# Patient Record
Sex: FEMALE | Race: WHITE | NOT HISPANIC OR LATINO | Employment: OTHER | URBAN - NONMETROPOLITAN AREA
[De-identification: names, ages, dates, MRNs, and addresses within clinical notes are randomized per-mention and may not be internally consistent; named-entity substitution may affect disease eponyms.]

---

## 2020-07-13 ENCOUNTER — HOSPITAL ENCOUNTER (OUTPATIENT)
Facility: MEDICAL CENTER | Age: 60
End: 2020-07-13
Attending: PHYSICIAN ASSISTANT
Payer: COMMERCIAL

## 2020-07-13 ENCOUNTER — APPOINTMENT (OUTPATIENT)
Dept: RADIOLOGY | Facility: IMAGING CENTER | Age: 60
End: 2020-07-13
Attending: PHYSICIAN ASSISTANT
Payer: COMMERCIAL

## 2020-07-13 ENCOUNTER — OFFICE VISIT (OUTPATIENT)
Dept: URGENT CARE | Facility: PHYSICIAN GROUP | Age: 60
End: 2020-07-13
Payer: COMMERCIAL

## 2020-07-13 VITALS
SYSTOLIC BLOOD PRESSURE: 118 MMHG | HEIGHT: 62 IN | OXYGEN SATURATION: 92 % | WEIGHT: 150 LBS | DIASTOLIC BLOOD PRESSURE: 80 MMHG | HEART RATE: 108 BPM | RESPIRATION RATE: 22 BRPM | TEMPERATURE: 98.8 F | BODY MASS INDEX: 27.6 KG/M2

## 2020-07-13 DIAGNOSIS — J18.9 PNEUMONIA OF LEFT LOWER LOBE DUE TO INFECTIOUS ORGANISM: ICD-10-CM

## 2020-07-13 DIAGNOSIS — R06.02 SOB (SHORTNESS OF BREATH): ICD-10-CM

## 2020-07-13 LAB — COVID ORDER STATUS COVID19: NORMAL

## 2020-07-13 PROCEDURE — 71046 X-RAY EXAM CHEST 2 VIEWS: CPT | Mod: TC,FY | Performed by: PHYSICIAN ASSISTANT

## 2020-07-13 PROCEDURE — 99204 OFFICE O/P NEW MOD 45 MIN: CPT | Performed by: PHYSICIAN ASSISTANT

## 2020-07-13 PROCEDURE — U0003 INFECTIOUS AGENT DETECTION BY NUCLEIC ACID (DNA OR RNA); SEVERE ACUTE RESPIRATORY SYNDROME CORONAVIRUS 2 (SARS-COV-2) (CORONAVIRUS DISEASE [COVID-19]), AMPLIFIED PROBE TECHNIQUE, MAKING USE OF HIGH THROUGHPUT TECHNOLOGIES AS DESCRIBED BY CMS-2020-01-R: HCPCS

## 2020-07-13 RX ORDER — ALBUTEROL SULFATE 90 UG/1
2 AEROSOL, METERED RESPIRATORY (INHALATION) EVERY 4 HOURS PRN
Qty: 1 INHALER | Refills: 0 | Status: SHIPPED | OUTPATIENT
Start: 2020-07-13 | End: 2020-10-26

## 2020-07-13 RX ORDER — CEFUROXIME AXETIL 500 MG/1
500 TABLET ORAL 2 TIMES DAILY
Qty: 10 TAB | Refills: 0 | Status: SHIPPED | OUTPATIENT
Start: 2020-07-13 | End: 2020-07-18

## 2020-07-13 RX ORDER — DOXYCYCLINE 100 MG/1
100 CAPSULE ORAL 2 TIMES DAILY
Qty: 10 CAP | Refills: 0 | Status: SHIPPED | OUTPATIENT
Start: 2020-07-13 | End: 2020-07-18

## 2020-07-13 RX ORDER — PREDNISONE 20 MG/1
40 TABLET ORAL DAILY
Qty: 10 TAB | Refills: 0 | Status: SHIPPED | OUTPATIENT
Start: 2020-07-13 | End: 2020-07-18

## 2020-07-13 ASSESSMENT — ENCOUNTER SYMPTOMS
CHILLS: 0
SHORTNESS OF BREATH: 1
HEMOPTYSIS: 0
SPUTUM PRODUCTION: 0
SORE THROAT: 0
FEVER: 0
PALPITATIONS: 0
WHEEZING: 0
COUGH: 1

## 2020-07-13 ASSESSMENT — COPD QUESTIONNAIRES: COPD: 1

## 2020-07-13 NOTE — PROGRESS NOTES
"Subjective:      Kristel Sewell is a 59 y.o. female who presents with Cough (Cough/SOB, Pt states Sx are worse at night. )            Cough   This is a new problem. The current episode started in the past 7 days. The problem has been unchanged. The cough is productive of sputum. Associated symptoms include shortness of breath. Pertinent negatives include no chest pain, chills, ear pain, fever, hemoptysis, sore throat or wheezing. Nothing aggravates the symptoms. She has tried nothing for the symptoms. Her past medical history is significant for COPD.       Review of Systems   Constitutional: Positive for malaise/fatigue. Negative for chills and fever.   HENT: Negative for congestion, ear pain and sore throat.    Respiratory: Positive for cough and shortness of breath. Negative for hemoptysis, sputum production and wheezing.    Cardiovascular: Negative for chest pain and palpitations.   All other systems reviewed and are negative.    PMH:  has no past medical history on file.  MEDS: No current outpatient medications on file.  ALLERGIES:   Allergies   Allergen Reactions   • Penicillins      SURGHX: History reviewed. No pertinent surgical history.  SOCHX:    FH: Family history was reviewed, no pertinent findings to report  Medications, Allergies, and current problem list reviewed today in Epic'   Objective:     Blood Pressure 118/80   Pulse (Abnormal) 108   Temperature 37.1 °C (98.8 °F) (Temporal)   Respiration (Abnormal) 22   Height 1.575 m (5' 2\")   Weight 68 kg (150 lb)   Oxygen Saturation 92%   Body Mass Index 27.44 kg/m²      Physical Exam  Vitals signs reviewed.   Constitutional:       General: She is not in acute distress.     Appearance: She is well-developed. She is not ill-appearing or toxic-appearing.      Interventions: She is not intubated.  HENT:      Head: Normocephalic and atraumatic.      Right Ear: Hearing, tympanic membrane, ear canal and external ear normal.      Left Ear: Hearing, " tympanic membrane, ear canal and external ear normal.      Nose: Nose normal.      Mouth/Throat:      Pharynx: Uvula midline.   Eyes:      General: Lids are normal.      Conjunctiva/sclera: Conjunctivae normal.   Neck:      Musculoskeletal: Full passive range of motion without pain, normal range of motion and neck supple.   Cardiovascular:      Rate and Rhythm: Regular rhythm.      Heart sounds: Normal heart sounds, S1 normal and S2 normal. No murmur. No friction rub. No gallop.    Pulmonary:      Effort: Pulmonary effort is normal. Tachypnea present. No bradypnea, accessory muscle usage or respiratory distress. She is not intubated.      Breath sounds: Normal breath sounds. Decreased air movement present. No decreased breath sounds, wheezing, rhonchi or rales.   Chest:      Chest wall: No tenderness.   Musculoskeletal: Normal range of motion.   Skin:     General: Skin is warm and dry.   Neurological:      Mental Status: She is alert and oriented to person, place, and time.   Psychiatric:         Speech: Speech normal.         Behavior: Behavior normal.         Thought Content: Thought content normal.         Judgment: Judgment normal.            7/13/2020 11:40 AM     HISTORY/REASON FOR EXAM:  Shortness of breath     TECHNIQUE/EXAM DESCRIPTION:  PA and lateral views of the chest.     COMPARISON:  None     FINDINGS:     There is blunting of the right costophrenic angle. The heart is not enlarged. Atherosclerotic calcification is seen.  There is a calcified granuloma in the left midlung. There is a left perihilar focal opacity. No pneumothorax is identified. Degenerative   changes are seen in the spine.     IMPRESSION:     Left perihilar focal opacity may represent pneumonia or a mass. Further evaluation with CT chest is recommended.     Blunting of the right costophrenic angle may be related to pleural thickening or trace pleural fluid.     Sequelae of prior granulomatous exposure.     Atherosclerotic plaque.      Assessment/Plan:     Patient is a 59 yr old female who presents for evaluation of shortness of breath.  Pt states she has had shortness of breath and cough for 3 days.  Pt denies red flag symptoms fever, chest pain, orthopnea, pleuritic pain, edema, SOB w/ exertion.  PMH everyday smoker with history of COPD.  Vital signs are within normal limits.  Pt does not appear in respiratory distress and is speaking in full sentences. There is no tripoding, stridor, work of breathing.  Upper airway is open and clear with no JVD.  Oxygen is 92% on room air.  Tachycardic without murmurs, rubs, or gallops with auscultation of heart.  Diminished lung sounds.  No signs of cyanosis, strong capillary refill and extremity pulses.  No pitting edema.  Chest x-ray shows likely pneumonia.  At this time it appears she is in stable condition to be treated outpatient.  She is to follow-up in 3 days for reevaluation.  If symptoms worsen she is to present to the emergency department for further evaluation and treatment.    Diagnosis differential includes but not limited to: Bronchospasm, Asthma/COPD/CHF exacerbation, Pneumonia, PE, Angioedema, Epiglottitis, Anaphylaxis, Pneumothorax, Interstitial lung disease, Cardiac arrhythmia, ACS, Pericardial effusion, Anemia, Sepsis, DKA, Neuromuscular weakness, Toxicology etiology.      1. Pneumonia of left lower lobe due to infectious organism  DX-CHEST-2 VIEWS    cefUROXime (CEFTIN) 500 MG Tab    doxycycline (MONODOX) 100 MG capsule    albuterol (PROAIR HFA) 108 (90 Base) MCG/ACT Aero Soln inhalation aerosol    predniSONE (DELTASONE) 20 MG Tab    REFERRAL TO FAMILY PRACTICE    COVID/SARS COV-2 PCR       Differential diagnosis, natural history, supportive care discussed. Follow-up with primary care provider within 7-10 days, emergency room precautions discussed.  Patient and/or family appears understanding of information.  Handout and review of patients diagnosis and treatment was discussed  extensively.

## 2020-07-15 ENCOUNTER — TELEPHONE (OUTPATIENT)
Dept: URGENT CARE | Facility: CLINIC | Age: 60
End: 2020-07-15

## 2020-07-15 LAB
SARS-COV-2 RNA RESP QL NAA+PROBE: NOTDETECTED
SPECIMEN SOURCE: NORMAL

## 2020-10-26 ENCOUNTER — OFFICE VISIT (OUTPATIENT)
Dept: URGENT CARE | Facility: PHYSICIAN GROUP | Age: 60
End: 2020-10-26
Payer: COMMERCIAL

## 2020-10-26 ENCOUNTER — APPOINTMENT (OUTPATIENT)
Dept: RADIOLOGY | Facility: IMAGING CENTER | Age: 60
End: 2020-10-26
Attending: FAMILY MEDICINE
Payer: COMMERCIAL

## 2020-10-26 VITALS
OXYGEN SATURATION: 97 % | HEART RATE: 82 BPM | HEIGHT: 62 IN | TEMPERATURE: 97.5 F | RESPIRATION RATE: 16 BRPM | SYSTOLIC BLOOD PRESSURE: 140 MMHG | BODY MASS INDEX: 27.64 KG/M2 | WEIGHT: 150.2 LBS | DIASTOLIC BLOOD PRESSURE: 92 MMHG

## 2020-10-26 DIAGNOSIS — R93.89 ABNORMAL FINDING ON CHEST XRAY: ICD-10-CM

## 2020-10-26 DIAGNOSIS — R06.02 SHORTNESS OF BREATH: ICD-10-CM

## 2020-10-26 DIAGNOSIS — J44.1 COPD EXACERBATION (HCC): ICD-10-CM

## 2020-10-26 PROCEDURE — 99214 OFFICE O/P EST MOD 30 MIN: CPT | Performed by: FAMILY MEDICINE

## 2020-10-26 PROCEDURE — 71046 X-RAY EXAM CHEST 2 VIEWS: CPT | Mod: TC,FY | Performed by: FAMILY MEDICINE

## 2020-10-26 RX ORDER — ALBUTEROL SULFATE 90 UG/1
AEROSOL, METERED RESPIRATORY (INHALATION)
Qty: 8.5 G | Refills: 5 | Status: SHIPPED | OUTPATIENT
Start: 2020-10-26 | End: 2021-10-23

## 2020-10-26 RX ORDER — PREDNISONE 20 MG/1
40 TABLET ORAL DAILY
Qty: 10 TAB | Refills: 0 | Status: SHIPPED | OUTPATIENT
Start: 2020-10-26 | End: 2020-10-31

## 2020-10-26 NOTE — PROGRESS NOTES
Chief Complaint:    Chief Complaint   Patient presents with   • Shortness of Breath     due to COPD, on O2 during the past week.    • Asthma     refill on RX albuterol.        History of Present Illness:     present. This is a new problem. Patient reports having shortness of breath for 6 months or longer, but has run out of MDI. She reports history of COPD and has oxygen to use, but no other lung meds. She was treated with Ceftin, Doxycycline, Prednisone, and Albuterol MDI on 7/13/2020 which helped. CXR (7/13/2020) showed: Left perihilar focal opacity may represent pneumonia or a mass. Further evaluation with CT chest is recommended.      Review of Systems:    Constitutional: Negative for fever, chills, and diaphoresis.   Eyes: Negative for change in vision, photophobia, pain, redness, and discharge.  ENT: Negative for ear pain, ear discharge, hearing loss, tinnitus, nasal congestion, nosebleeds, and sore throat.    Respiratory: See HPI.   Cardiovascular: Negative for chest pain, palpitations, orthopnea, claudication, leg swelling, and PND.   Gastrointestinal: Negative for abdominal pain, nausea, vomiting, diarrhea, constipation, blood in stool, and melena.   Genitourinary: Negative for dysuria, urinary urgency, urinary frequency, hematuria, and flank pain.   Musculoskeletal: Negative for myalgias, joint pain, neck pain, and back pain.   Skin: Negative for rash and itching.   Neurological: Negative for dizziness, tingling, tremors, sensory change, speech change, focal weakness, seizures, loss of consciousness, and headaches.   Endo: Negative for polydipsia.   Heme: Does not bruise/bleed easily.   Psychiatric/Behavioral: Negative for depression, suicidal ideas, hallucinations, memory loss and substance abuse. The patient is not nervous/anxious and does not have insomnia.        Past Medical History:    No past medical history on file.    Past Surgical History:    No past surgical history on file.    Social  "History:    Social History     Socioeconomic History   • Marital status:      Spouse name: Not on file   • Number of children: Not on file   • Years of education: Not on file   • Highest education level: Not on file   Occupational History   • Not on file   Social Needs   • Financial resource strain: Not on file   • Food insecurity     Worry: Not on file     Inability: Not on file   • Transportation needs     Medical: Not on file     Non-medical: Not on file   Tobacco Use   • Smoking status: Not on file   Substance and Sexual Activity   • Alcohol use: Not on file   • Drug use: Not on file   • Sexual activity: Not on file   Lifestyle   • Physical activity     Days per week: Not on file     Minutes per session: Not on file   • Stress: Not on file   Relationships   • Social connections     Talks on phone: Not on file     Gets together: Not on file     Attends Oriental orthodox service: Not on file     Active member of club or organization: Not on file     Attends meetings of clubs or organizations: Not on file     Relationship status: Not on file   • Intimate partner violence     Fear of current or ex partner: Not on file     Emotionally abused: Not on file     Physically abused: Not on file     Forced sexual activity: Not on file   Other Topics Concern   • Not on file   Social History Narrative   • Not on file     Family History:    No family history on file.    Medications:    No current outpatient medications on file prior to visit.     No current facility-administered medications on file prior to visit.      Allergies:    Allergies   Allergen Reactions   • Penicillins        Vitals:    Vitals:    10/26/20 1049   BP: 140/92   Pulse: 82   Resp: 16   Temp: 36.4 °C (97.5 °F)   TempSrc: Temporal   SpO2: 97%   Weight: 68.1 kg (150 lb 3.2 oz)   Height: 1.575 m (5' 2\")       Physical Exam:    Constitutional: Vital signs reviewed. Appears well-developed and well-nourished. No acute distress.   Eyes: Sclera white, conjunctivae " clear.   ENT: External ears normal. Hearing normal.   Neck: Neck supple.   Cardiovascular: Regular rate and rhythm. No murmur.  Pulmonary/Chest: Respirations non-labored. Decreased breath sounds bilaterally, but clear to auscultation bilaterally.  Lymph: Cervical nodes without tenderness or enlargement.  Musculoskeletal: Normal gait. No muscular atrophy or weakness.  Neurological: Alert and oriented to person, place, and time. Muscle tone normal. Coordination normal.   Skin: No rashes or lesions. Warm, dry, normal turgor.  Psychiatric: Normal mood and affect. Behavior is normal. Judgment and thought content normal.       Diagnostics:    DX-CHEST-2 VIEWS  Order: [id number]  Status:  Final result   Visible to patient:  No (not released) Next appt:  None Dx:  Shortness of breath; Abnormal finding...  Details    Reading Physician Reading Date Result Priority   Melinda Venegas M.D.  [phone] 10/26/2020 [facility]      Narrative & Impression        10/26/2020 11:02 AM     HISTORY/REASON FOR EXAM:  Shortness of Breath; Room 2. CXR (7/13/2020): Left perihilar focal opacity may represent pneumonia or a mass. Further evaluation with CT chest is recommended..        TECHNIQUE/EXAM DESCRIPTION AND NUMBER OF VIEWS:  Two views of the chest.     COMPARISON:  7/13/2020     FINDINGS:  Subtle opacity in the left midlung is no longer seen. Old granulomatous disease is present with calcified granuloma in the left midlung. Right costophrenic angle blunting appears unchanged. No new parenchymal abnormalities are appreciated. The heart size   is normal. Probable retrocardiac hiatal hernia is again appreciated.     Spurs are present in the thoracic spine.     IMPRESSION:     Stable right costophrenic angle blunting which may represent pleural thickening or trace pleural fluid.     Interval resolution of focal left perihilar opacity.     Old granulomatous disease.              I personally reviewed the images. Rad report reviewed with  them and copy of report to them.      Assessment / Plan:    1. Shortness of breath  - DX-CHEST-2 VIEWS; Future    2. Abnormal finding on chest xray  - DX-CHEST-2 VIEWS; Future    3. COPD exacerbation (HCC)  - albuterol 108 (90 Base) MCG/ACT Aero Soln inhalation aerosol; 2 PUFFS EVERY 4 HOURS ONLY IF NEEDED FOR COUGH, WHEEZING, OR SHORTNESS OF BREATH.  Dispense: 8.5 g; Refill: 5  - predniSONE (DELTASONE) 20 MG Tab; Take 2 Tabs by mouth every day for 5 days.  Dispense: 10 Tab; Refill: 0      Discussed with them DDX, management options, and risks, benefits, and alternatives to treatment plan agreed upon.    Agreeable to medications prescribed.    Discussed expected course of duration, time for improvement, and to seek follow-up in Emergency Room, urgent care, or with PCP if getting worse at any time or not improving within expected time frame.

## 2021-05-17 ENCOUNTER — TELEPHONE (OUTPATIENT)
Dept: SCHEDULING | Facility: IMAGING CENTER | Age: 61
End: 2021-05-17

## 2021-05-19 ENCOUNTER — APPOINTMENT (OUTPATIENT)
Dept: RADIOLOGY | Facility: IMAGING CENTER | Age: 61
End: 2021-05-19
Attending: NURSE PRACTITIONER
Payer: COMMERCIAL

## 2021-05-19 ENCOUNTER — OFFICE VISIT (OUTPATIENT)
Dept: MEDICAL GROUP | Facility: PHYSICIAN GROUP | Age: 61
End: 2021-05-19
Payer: COMMERCIAL

## 2021-05-19 VITALS
TEMPERATURE: 98.1 F | SYSTOLIC BLOOD PRESSURE: 164 MMHG | BODY MASS INDEX: 28.16 KG/M2 | WEIGHT: 153 LBS | OXYGEN SATURATION: 95 % | DIASTOLIC BLOOD PRESSURE: 90 MMHG | HEART RATE: 95 BPM | RESPIRATION RATE: 24 BRPM | HEIGHT: 62 IN

## 2021-05-19 DIAGNOSIS — Z12.11 SCREENING FOR COLORECTAL CANCER: ICD-10-CM

## 2021-05-19 DIAGNOSIS — F17.210 TOBACCO DEPENDENCE DUE TO CIGARETTES: ICD-10-CM

## 2021-05-19 DIAGNOSIS — Z76.89 ENCOUNTER TO ESTABLISH CARE: ICD-10-CM

## 2021-05-19 DIAGNOSIS — Z11.59 NEED FOR HEPATITIS C SCREENING TEST: ICD-10-CM

## 2021-05-19 DIAGNOSIS — J44.89 COPD (CHRONIC OBSTRUCTIVE PULMONARY DISEASE) WITH CHRONIC BRONCHITIS (HCC): ICD-10-CM

## 2021-05-19 DIAGNOSIS — Z12.31 ENCOUNTER FOR SCREENING MAMMOGRAM FOR BREAST CANCER: ICD-10-CM

## 2021-05-19 DIAGNOSIS — Z13.6 SCREENING FOR CARDIOVASCULAR CONDITION: ICD-10-CM

## 2021-05-19 DIAGNOSIS — I10 ESSENTIAL HYPERTENSION: ICD-10-CM

## 2021-05-19 DIAGNOSIS — Z12.12 SCREENING FOR COLORECTAL CANCER: ICD-10-CM

## 2021-05-19 DIAGNOSIS — F33.0 MILD EPISODE OF RECURRENT MAJOR DEPRESSIVE DISORDER (HCC): ICD-10-CM

## 2021-05-19 DIAGNOSIS — F41.1 GAD (GENERALIZED ANXIETY DISORDER): ICD-10-CM

## 2021-05-19 PROCEDURE — 71046 X-RAY EXAM CHEST 2 VIEWS: CPT | Mod: TC,FY | Performed by: NURSE PRACTITIONER

## 2021-05-19 PROCEDURE — 99214 OFFICE O/P EST MOD 30 MIN: CPT | Performed by: NURSE PRACTITIONER

## 2021-05-19 RX ORDER — LISINOPRIL 10 MG/1
10 TABLET ORAL DAILY
Qty: 30 TABLET | Refills: 0 | Status: SHIPPED | OUTPATIENT
Start: 2021-05-19 | End: 2021-06-02 | Stop reason: SDUPTHER

## 2021-05-19 RX ORDER — ALBUTEROL SULFATE 90 UG/1
2 AEROSOL, METERED RESPIRATORY (INHALATION) EVERY 6 HOURS PRN
Qty: 18 G | Refills: 6 | Status: SHIPPED | OUTPATIENT
Start: 2021-05-19 | End: 2023-02-24 | Stop reason: SDUPTHER

## 2021-05-19 RX ORDER — TIOTROPIUM BROMIDE INHALATION SPRAY 3.12 UG/1
5 SPRAY, METERED RESPIRATORY (INHALATION) DAILY
Qty: 4 G | Refills: 11 | Status: SHIPPED | OUTPATIENT
Start: 2021-05-19 | End: 2023-02-24 | Stop reason: SDUPTHER

## 2021-05-19 RX ORDER — ESCITALOPRAM OXALATE 10 MG/1
10 TABLET ORAL DAILY
Qty: 30 TABLET | Refills: 0 | Status: SHIPPED | OUTPATIENT
Start: 2021-05-19 | End: 2021-06-02

## 2021-05-19 RX ORDER — HYDROXYZINE HYDROCHLORIDE 25 MG/1
25 TABLET, FILM COATED ORAL 3 TIMES DAILY PRN
Qty: 30 TABLET | Refills: 0 | Status: SHIPPED | OUTPATIENT
Start: 2021-05-19

## 2021-05-19 RX ORDER — FLUTICASONE PROPIONATE AND SALMETEROL XINAFOATE 230; 21 UG/1; UG/1
2 AEROSOL, METERED RESPIRATORY (INHALATION) 2 TIMES DAILY
Qty: 12 G | Refills: 11 | Status: SHIPPED | OUTPATIENT
Start: 2021-05-19 | End: 2022-10-17

## 2021-05-19 ASSESSMENT — ANXIETY QUESTIONNAIRES
3. WORRYING TOO MUCH ABOUT DIFFERENT THINGS: NEARLY EVERY DAY
IF YOU CHECKED OFF ANY PROBLEMS ON THIS QUESTIONNAIRE, HOW DIFFICULT HAVE THESE PROBLEMS MADE IT FOR YOU TO DO YOUR WORK, TAKE CARE OF THINGS AT HOME, OR GET ALONG WITH OTHER PEOPLE: VERY DIFFICULT
GAD7 TOTAL SCORE: 20
4. TROUBLE RELAXING: NEARLY EVERY DAY
1. FEELING NERVOUS, ANXIOUS, OR ON EDGE: NEARLY EVERY DAY
6. BECOMING EASILY ANNOYED OR IRRITABLE: NEARLY EVERY DAY
7. FEELING AFRAID AS IF SOMETHING AWFUL MIGHT HAPPEN: NEARLY EVERY DAY
2. NOT BEING ABLE TO STOP OR CONTROL WORRYING: NEARLY EVERY DAY
5. BEING SO RESTLESS THAT IT IS HARD TO SIT STILL: MORE THAN HALF THE DAYS

## 2021-05-19 ASSESSMENT — PATIENT HEALTH QUESTIONNAIRE - PHQ9
CLINICAL INTERPRETATION OF PHQ2 SCORE: 6
5. POOR APPETITE OR OVEREATING: 0 - NOT AT ALL
SUM OF ALL RESPONSES TO PHQ QUESTIONS 1-9: 8

## 2021-05-19 NOTE — PROGRESS NOTES
Chief Complaint   Patient presents with   • Establish Care   • COPD       Subjective:     HPI:   Patient is here to establish care with me as a new provider.  She indicates that her last primary care provider was approximately 7 years ago where she was diagnosed with emphysema and placed on oxygen.  Today she is requesting a new prescription for oxygen and to have a pulmonary work-up.      Kristel Sewell is a 60 y.o. female here to discuss the evaluation and management of:        GILL (generalized anxiety disorder)  GILL 7 5/19/2021   GILL-7 Total Score 20     Interpretation of GILL 7 Total Score   Score Severity:  0-4 No Anxiety   5-9 Mild Anxiety  10-14 Moderate Anxiety  15-21 Severe Anxiety    Onset: about 2 yrs ago  Duration: contant  When do the symptoms occur? [facility] [place] [facility] [place] [facility] and she is caring for her children and her son live with them with their children.   Modifying factors: [place] the [place].   Associated symptoms: worrying all time, palpation, SOB, and angered easily.  Medication: none   Counseling: none.          Mild episode of recurrent major depressive disorder (HCC)  Depression Screen (PHQ-2/PHQ-9) 5/19/2021   PHQ-2 Total Score 6   PHQ-9 Total Score 8     Interpretation of PHQ-9 Total Score   Score Severity   1-4 No Depression   5-9 Mild Depression   10-14 Moderate Depression   15-19 Moderately Severe Depression   20-27 Severe Depression    This is a acute on chronic condition.  Onset: about 2 yrs ago  Duration: Constant   PHQ screen: 9  Associated symptoms: don't want to clean the house, not being able to breath.   Suicidal ideation/homicidal ideation: None  Therapy/counseling: no   Medications: No  History of bipolar- No      COPD (chronic obstructive pulmonary disease) with chronic bronchitis (HCC)  This is a Chronic condition that is newly being evaluated today.  Patient indicates she was given an inhaler of albuterol at the urgent care that helps a little with her  breathing but not 100%.  She was placed on oxygen at night in 2014 however she has had to start using her oxygen throughout the day in order to be able to feel like she is breathing.  Difficulty breathing past approximately 10 feet.  Able to sit and speak full sentences without becoming short of breath.  She is currently using her albuterol inhaler 4 puffs every 2 hours.  She denies any fever, chills, or chest pain.   Patient does snore at night.     Essential hypertension  This is a new condition.  Patient's blood pressure in clinic was 170/98 prior to exam  Second blood pressure was: 160/90  Patient denies any headache, chest pain, dizziness, or syncopal episodes.  Patient indicates that she has never been diagnosed with high blood pressure and she thinks that this is all due to her anxiety.            ROS:  Gen: no fevers/chills, no changes in weight  Eyes: no changes in vision  ENT: no sore throat, no hearing loss, no bloody nose  Pulm: Positive per HPI  CV: Positive per HPI  GI: no nausea/vomiting, no diarrhea  Neuro: no headaches, no numbness/tingling  Heme/Lymph: no easy bruising  Psych: Positive per HPI    Allergies   Allergen Reactions   • Penicillins        Current medicines (including changes today)  Current Outpatient Medications   Medication Sig Dispense Refill   • fluticasone-salmeterol (ADVAIR HFA) 230-21 MCG/ACT inhaler Inhale 2 Puffs 2 times a day. 12 g 11   • tiotropium (SPIRIVA RESPIMAT) 2.5 mcg/Act Aero Soln Inhale 2 Inhalation every day. 4 g 11   • albuterol (PROAIR HFA) 108 (90 Base) MCG/ACT Aero Soln inhalation aerosol Inhale 2 Puffs every 6 hours as needed for Shortness of Breath. 18 g 6   • escitalopram (LEXAPRO) 10 MG Tab Take 1 tablet by mouth every day. 30 tablet 0   • hydrOXYzine HCl (ATARAX) 25 MG Tab Take 1 tablet by mouth 3 times a day as needed for Anxiety. 30 tablet 0   • lisinopril (PRINIVIL) 10 MG Tab Take 1 tablet by mouth every day. 30 tablet 0   • albuterol 108 (90 Base)  "MCG/ACT Aero Soln inhalation aerosol 2 PUFFS EVERY 4 HOURS ONLY IF NEEDED FOR COUGH, WHEEZING, OR SHORTNESS OF BREATH. 8.5 g 5     No current facility-administered medications for this visit.       Social History     Tobacco Use   • Smoking status: Current Every Day Smoker     Packs/day: 1.00     Years: 40.00     Pack years: 40.00     Start date: 5/19/1991   • Smokeless tobacco: Never Used   Vaping Use   • Vaping Use: Never used   Substance Use Topics   • Alcohol use: Yes     Alcohol/week: 18.0 oz     Types: 30 Cans of beer per week     Comment: 3 day 12-16 or bottles   • Drug use: Not Currently       Patient Active Problem List    Diagnosis Date Noted   • GILL (generalized anxiety disorder) 05/19/2021   • Mild episode of recurrent major depressive disorder (HCC) 05/19/2021   • COPD (chronic obstructive pulmonary disease) with chronic bronchitis (HCC) 05/19/2021   • Tobacco dependence due to cigarettes 05/19/2021   • Essential hypertension 05/19/2021       Family History   Problem Relation Age of Onset   • Kidney Disease Mother    • Heart Disease Mother    • Stroke Mother    • Diabetes Mother    • Diabetes Father    • Kidney Disease Father    • Arrythmia Father           Objective:     BP (!) 164/90   Pulse 95   Temp 36.7 °C (98.1 °F) (Temporal)   Resp (!) 24   Ht 1.575 m (5' 2\")   Wt 69.4 kg (153 lb)   SpO2 95%  Body mass index is 27.98 kg/m².    Physical Exam:  Constitutional: Well-developed and obese female. Not diaphoretic. No distress.   Skin: warm, dry, intact  Head: Atraumatic without lesions.  Eyes: Conjunctivae and extraocular motions are normal. Pupils are equal, round, and reactive to light. No scleral icterus.   Ears:  External ears unremarkable. TMs normal; bilaterally  Mouth/Throat: Top and bottom dentures, tongue normal. Oropharynx is clear and moist. Posterior pharynx without erythema or exudates.  Neck: Supple, trachea midline. No thyromegaly present. No cervical or supraclavicular " lymphadenopathy.  Cardiovascular: Regular rate and rhythm without murmur. Radial pulses are intact and equal bilaterally.  Pulmonary: Diminished lung sounds throughout with expiratory wheezing normal effort. No rales or ronchi  Abdomen: Soft, non tender, and without distention. Active bowel sounds in all four quadrants.   Extremities: No cyanosis, clubbing, erythema, nor edema.   Neurological: Alert and oriented x 3.  No cranial nerve deficit. 5/5 myotomes. Sensation intact.   Psychiatric: Anxious and depressed mood       Assessment and Plan:     The following treatment plan was discussed:     1. Encounter to [facility] care  Patient has not seen a primary care provider in over 7 years.  She is due for lab work.  We did discuss Covid vaccine I highly encouraged her to get the vaccine.  She is agreeable for lung, colon and breast cancer screenings.    2. GILL (generalized anxiety disorder)  Patient was given education about anxiety attacks and when to seek emergency medical attention.  Did discuss coping mechanisms with her.  Discussed counseling however she refused at this time.  Prescription of Lexapro and hydroxyzine were given.  She was instructed not to drink or drink minimally on these medications.  Close follow-up in 2 weeks    - CBC WITH DIFFERENTIAL; Future  - escitalopram (LEXAPRO) 10 MG Tab; Take 1 tablet by mouth every day.  Dispense: 30 tablet; Refill: 0  - hydrOXYzine HCl (ATARAX) 25 MG Tab; Take 1 tablet by mouth 3 times a day as needed for Anxiety.  Dispense: 30 tablet; Refill: 0    3. Mild episode of recurrent major depressive disorder (HCC)  Patient was given education regards to signs and symptoms of depression.  She is also educated about suicidal ideations and when to seek emergency medical attention.  Today she was placed on a trial of Lexapro and will reevaluate in 2 weeks.  She denied counseling at this time  - escitalopram (LEXAPRO) 10 MG Tab; Take 1 tablet by mouth every day.  Dispense: 30  tablet; Refill: 0    4. COPD (chronic obstructive pulmonary disease) with chronic bronchitis (HCC)  Patient was highly encouraged to quit smoking however she refused counseling or medications at this time.  Patient was agreeable to pulmonology and lung cancer screening.  Patient has not been on on any inhalers other than albuterol.  She was requesting portable oxygen however I would like her to be evaluated by pulmonology to ensure adequate oxygen flow rate.  Close follow-up in 2 weeks.  - fluticasone-salmeterol (ADVAIR HFA) 230-21 MCG/ACT inhaler; Inhale 2 Puffs 2 times a day.  Dispense: 12 g; Refill: 11  - tiotropium (SPIRIVA RESPIMAT) 2.5 mcg/Act Aero Soln; Inhale 2 Inhalation every day.  Dispense: 4 g; Refill: 11  - albuterol (PROAIR HFA) 108 (90 Base) MCG/ACT Aero Soln inhalation aerosol; Inhale 2 Puffs every 6 hours as needed for Shortness of Breath.  Dispense: 18 g; Refill: 6  - CBC WITH DIFFERENTIAL; Future  - REFERRAL TO PULMONARY AND SLEEP MEDICINE  - DX-CHEST-2 VIEWS; Future    5. Essential hypertension  Patient was educated about decreasing salt in diet and to quit smoking.  She is also educated on signs and symptoms of stroke and when to go to the emergency department.  - lisinopril (PRINIVIL) 10 MG Tab; Take 1 tablet by mouth every day.  Dispense: 30 tablet; Refill: 0    6. Tobacco dependence due to cigarettes  Patient wants to continue to smoke cigarettes and she states she does understand the risks of doing so.  Smoking cessation was provided today via education and handout pamphlets.  - CBC WITH DIFFERENTIAL; Future  - REFERRAL TO LUNG CANCER SCREENING PROGRAM; Future    7. Screening for colorectal cancer  - REFERRAL TO GI FOR COLONOSCOPY    8. Encounter for screening mammogram for breast cancer  - MA-SCREENING MAMMO BILAT W/CAD; Future    9. Need for hepatitis C screening test  - HCV Scrn ( [id number] [employer]); Future    10. Screening for cardiovascular condition  - Comp Metabolic Panel; Future  -  Lipid Profile; Future      Any change or worsening of signs or symptoms, patient encouraged to follow-up or report to emergency room for further evaluation. Patient verbalizes understanding and agrees.    Follow-Up: Return in about 2 weeks (around 6/2/2021) for Follow up labs.      PLEASE NOTE: This dictation was created using voice recognition software. I have made every reasonable attempt to correct obvious errors, but I expect that there are errors of grammar and possibly content that I did not discover before finalizing the note.

## 2021-05-19 NOTE — ASSESSMENT & PLAN NOTE
This is a Chronic condition that is newly being evaluated today.  Patient indicates she was given an inhaler of albuterol at the urgent care that helps a little with her breathing but not 100%.  She was placed on oxygen at night in 2014 however she has had to start using her oxygen throughout the day in order to be able to feel like she is breathing.  Difficulty breathing past approximately 10 feet.  Able to sit and speak full sentences without becoming short of breath.  She is currently using her albuterol inhaler 4 puffs every 2 hours.  She denies any fever, chills, or chest pain.   Patient does snore at night.

## 2021-05-19 NOTE — ASSESSMENT & PLAN NOTE
GILL 7 5/19/2021   GILL-7 Total Score 20     Interpretation of GILL 7 Total Score   Score Severity:  0-4 No Anxiety   5-9 Mild Anxiety  10-14 Moderate Anxiety  15-21 Severe Anxiety    Onset: about 2 yrs ago  Duration: contant  When do the symptoms occur? Family stress with her daughter in [place] and she is caring for her children and her son live with them with their children.   Modifying factors: [place].   Associated symptoms: worrying all time, palpation, SOB, and angered easily.  Medication: none   Counseling: none.

## 2021-05-19 NOTE — ASSESSMENT & PLAN NOTE
Depression Screen (PHQ-2/PHQ-9) 5/19/2021   PHQ-2 Total Score 6   PHQ-9 Total Score 8     Interpretation of PHQ-9 Total Score   Score Severity   1-4 No Depression   5-9 Mild Depression   10-14 Moderate Depression   15-19 Moderately Severe Depression   20-27 Severe Depression    This is a acute on chronic condition.  Onset: about 2 yrs ago  Duration: Constant   PHQ screen: 9  Associated symptoms: don't want to clean the house, not being able to breath.   Suicidal ideation/homicidal ideation: None  Therapy/counseling: no   Medications: No  History of bipolar- No

## 2021-05-20 NOTE — ASSESSMENT & PLAN NOTE
This is a new condition.  Patient's blood pressure in clinic was 170/98 prior to exam  Second blood pressure was: 160/90  Patient denies any headache, chest pain, dizziness, or syncopal episodes.  Patient indicates that she has never been diagnosed with high blood pressure and she thinks that this is all due to her anxiety.

## 2021-05-20 NOTE — PATIENT INSTRUCTIONS
Chronic Obstructive Pulmonary Disease  Chronic obstructive pulmonary disease (COPD) is a long-term (chronic) lung problem. When you have COPD, it is hard for air to get in and out of your lungs. Usually the condition gets worse over time, and your lungs will never return to normal. There are things you can do to keep yourself as healthy as possible.  · Your doctor may treat your condition with:  ? Medicines.  ? Oxygen.  ? Lung surgery.  · Your doctor may also recommend:  ? Rehabilitation. This includes steps to make your body work better. It may involve a team of specialists.  ? Quitting smoking, if you smoke.  ? Exercise and changes to your diet.  ? Comfort measures (palliative care).  Follow these instructions at home:  Medicines  · Take over-the-counter and prescription medicines only as told by your doctor.  · Talk to your doctor before taking any cough or allergy medicines. You may need to avoid medicines that cause your lungs to be dry.  Lifestyle  · If you smoke, stop. Smoking makes the problem worse. If you need help quitting, ask your doctor.  · Avoid being around things that make your breathing worse. This may include smoke, chemicals, and fumes.  · Stay active, but remember to rest as well.  · Learn and use tips on how to relax.  · Make sure you get enough sleep. Most adults need at least 7 hours of sleep every night.  · Eat healthy foods. Eat smaller meals more often. Rest before meals.  Controlled breathing  Learn and use tips on how to control your breathing as told by your doctor. Try:  · Breathing in (inhaling) through your nose for 1 second. Then, pucker your lips and breath out (exhale) through your lips for 2 seconds.  · Putting one hand on your belly (abdomen). Breathe in slowly through your nose for 1 second. Your hand on your belly should move out. Pucker your lips and breathe out slowly through your lips. Your hand on your belly should move in as you breathe out.    Controlled  coughing  Learn and use controlled coughing to clear mucus from your lungs. Follow these steps:  1. Lean your head a little forward.  2. Breathe in deeply.  3. Try to hold your breath for 3 seconds.  4. Keep your mouth slightly open while coughing 2 times.  5. Spit any mucus out into a tissue.  6. Rest and do the steps again 1 or 2 times as needed.  General instructions  · Make sure you get all the shots (vaccines) that your doctor recommends. Ask your doctor about a flu shot and a pneumonia shot.  · Use oxygen therapy and pulmonary rehabilitation if told by your doctor. If you need home oxygen therapy, ask your doctor if you should buy a tool to measure your oxygen level (oximeter).  · Make a COPD action plan with your doctor. This helps you to know what to do if you feel worse than usual.  · Manage any other conditions you have as told by your doctor.  · Avoid going outside when it is very hot, cold, or humid.  · Avoid people who have a sickness you can catch (contagious).  · Keep all follow-up visits as told by your doctor. This is important.  Contact a doctor if:  · You cough up more mucus than usual.  · There is a change in the color or thickness of the mucus.  · It is harder to breathe than usual.  · Your breathing is faster than usual.  · You have trouble sleeping.  · You need to use your medicines more often than usual.  · You have trouble doing your normal activities such as getting dressed or walking around the house.  Get help right away if:  · You have shortness of breath while resting.  · You have shortness of breath that stops you from:  ? Being able to talk.  ? Doing normal activities.  · Your chest hurts for longer than 5 minutes.  · Your skin color is more blue than usual.  · Your pulse oximeter shows that you have low oxygen for longer than 5 minutes.  · You have a fever.  · You feel too tired to breathe normally.  Summary  · Chronic obstructive pulmonary disease (COPD) is a long-term lung  problem.  · The way your lungs work will never return to normal. Usually the condition gets worse over time. There are things you can do to keep yourself as healthy as possible.  · Take over-the-counter and prescription medicines only as told by your doctor.  · If you smoke, stop. Smoking makes the problem worse.  This information is not intended to replace advice given to you by your health care provider. Make sure you discuss any questions you have with your health care provider.  Document Released: 06/05/2009 Document Revised: 11/30/2018 Document Reviewed: 01/22/2018  [employer] Patient Education © 2020 [employer]      Steps to Quit Smoking  Smoking tobacco is the leading cause of preventable death. It can affect almost every organ in the body. Smoking puts you and people around you at risk for many serious, long-lasting (chronic) diseases. Quitting smoking can be hard, but it is one of the best things that you can do for your health. It is never too late to quit.  How do I get ready to quit?  When you decide to quit smoking, make a plan to help you succeed. Before you quit:  · Pick a date to quit. Set a date within the next 2 weeks to give you time to prepare.  · Write down the reasons why you are quitting. Keep this list in places where you will see it often.  · Tell your family, friends, and co-workers that you are quitting. Their support is important.  · Talk with your doctor about the choices that may help you quit.  · Find out if your health insurance will pay for these treatments.  · Know the people, places, things, and activities that make you want to smoke (triggers). Avoid them.  What first steps can I take to quit smoking?  · Throw away all cigarettes at home, at work, and in your car.  · Throw away the things that you use when you smoke, such as ashtrays and lighters.  · Clean your car. Make sure to empty the ashtray.  · Clean your home, including curtains and carpets.  What can I do to help me quit  smoking?  Talk with your doctor about taking medicines and seeing a counselor at the same time. You are more likely to succeed when you do both.  · If you are pregnant or breastfeeding, talk with your doctor about counseling or other ways to quit smoking. Do not take medicine to help you quit smoking unless your doctor tells you to do so.  To quit smoking:  Quit right away  · Quit smoking totally, instead of slowly cutting back on how much you smoke over a period of time.  · Go to counseling. You are more likely to quit if you go to counseling sessions regularly.  Take medicine  You may take medicines to help you quit. Some medicines need a prescription, and some you can buy over-the-counter. Some medicines may contain a drug called nicotine to replace the nicotine in cigarettes. Medicines may:  · Help you to stop having the desire to smoke (cravings).  · Help to stop the problems that come when you stop smoking (withdrawal symptoms).  Your doctor may ask you to use:  · Nicotine patches, gum, or lozenges.  · Nicotine inhalers or sprays.  · Non-nicotine medicine that is taken by mouth.  Find resources  Find resources and other ways to help you quit smoking and remain smoke-free after you quit. These resources are most helpful when you use them often. They include:  · Online chats with a counselor.  · Phone quitlines.  · Printed self-help materials.  · Support groups or group counseling.  · Text messaging programs.  · Mobile phone apps. Use apps on your mobile phone or tablet that can help you stick to your quit plan. There are many free apps for mobile phones and tablets as well as websites. Examples include Quit Guide from the CDC and smokefree.gov    What things can I do to make it easier to quit?    · Talk to your family and friends. Ask them to support and encourage you.  · Call a phone quitline (7-800QUITNOW), reach out to support groups, or work with a counselor.  · Ask people who smoke to not smoke around  you.  · Avoid places that make you want to smoke, such as:  ? Bars.  ? Parties.  ? Smoke-break areas at work.  · Spend time with people who do not smoke.  · Lower the stress in your life. Stress can make you want to smoke. Try these things to help your stress:  ? Getting regular exercise.  ? Doing deep-breathing exercises.  ? Doing yoga.  ? Meditating.  ? Doing a body scan. To do this, close your eyes, focus on one area of your body at a time from head to toe. Notice which parts of your body are tense. Try to relax the muscles in those areas.  How will I feel when I quit smoking?  Day 1 to 3 weeks  Within the first 24 hours, you may start to have some problems that come from quitting tobacco. These problems are very bad 2-3 days after you quit, but they do not often last for more than 2-3 weeks. You may get these symptoms:  · Mood swings.  · Feeling restless, nervous, angry, or annoyed.  · Trouble concentrating.  · Dizziness.  · Strong desire for high-sugar foods and nicotine.  · Weight gain.  · Trouble pooping (constipation).  · Feeling like you may vomit (nausea).  · Coughing or a sore throat.  · Changes in how the medicines that you take for other issues work in your body.  · Depression.  · Trouble sleeping (insomnia).  Week 3 and afterward  After the first 2-3 weeks of quitting, you may start to notice more positive results, such as:  · Better sense of smell and taste.  · Less coughing and sore throat.  · Slower heart rate.  · Lower blood pressure.  · Clearer skin.  · Better breathing.  · Fewer sick days.  Quitting smoking can be hard. Do not give up if you fail the first time. Some people need to try a few times before they succeed. Do your best to stick to your quit plan, and talk with your doctor if you have any questions or concerns.  Summary  · Smoking tobacco is the leading cause of preventable death. Quitting smoking can be hard, but it is one of the best things that you can do for your health.  · When  you decide to quit smoking, make a plan to help you succeed.  · Quit smoking right away, not slowly over a period of time.  · When you start quitting, seek help from your doctor, family, or friends.  This information is not intended to replace advice given to you by your health care provider. Make sure you discuss any questions you have with your health care provider.  Document Released: 10/14/2010 Document Revised: 03/06/2020 Document Reviewed: 03/07/2020  [employer] Patient Education © 2020 [employer]      Chronic Obstructive Pulmonary Disease  Chronic obstructive pulmonary disease (COPD) is a long-term (chronic) lung problem. When you have COPD, it is hard for air to get in and out of your lungs. Usually the condition gets worse over time, and your lungs will never return to normal. There are things you can do to keep yourself as healthy as possible.  · Your doctor may treat your condition with:  ? Medicines.  ? Oxygen.  ? Lung surgery.  · Your doctor may also recommend:  ? Rehabilitation. This includes steps to make your body work better. It may involve a team of specialists.  ? Quitting smoking, if you smoke.  ? Exercise and changes to your diet.  ? Comfort measures (palliative care).  Follow these instructions at home:  Medicines  · Take over-the-counter and prescription medicines only as told by your doctor.  · Talk to your doctor before taking any cough or allergy medicines. You may need to avoid medicines that cause your lungs to be dry.  Lifestyle  · If you smoke, stop. Smoking makes the problem worse. If you need help quitting, ask your doctor.  · Avoid being around things that make your breathing worse. This may include smoke, chemicals, and fumes.  · Stay active, but remember to rest as well.  · Learn and use tips on how to relax.  · Make sure you get enough sleep. Most adults need at least 7 hours of sleep every night.  · Eat healthy foods. Eat smaller meals more often. Rest before meals.  Controlled  breathing  Learn and use tips on how to control your breathing as told by your doctor. Try:  · Breathing in (inhaling) through your nose for 1 second. Then, pucker your lips and breath out (exhale) through your lips for 2 seconds.  · Putting one hand on your belly (abdomen). Breathe in slowly through your nose for 1 second. Your hand on your belly should move out. Pucker your lips and breathe out slowly through your lips. Your hand on your belly should move in as you breathe out.    Controlled coughing  Learn and use controlled coughing to clear mucus from your lungs. Follow these steps:  7. Lean your head a little forward.  8. Breathe in deeply.  9. Try to hold your breath for 3 seconds.  10. Keep your mouth slightly open while coughing 2 times.  11. Spit any mucus out into a tissue.  12. Rest and do the steps again 1 or 2 times as needed.  General instructions  · Make sure you get all the shots (vaccines) that your doctor recommends. Ask your doctor about a flu shot and a pneumonia shot.  · Use oxygen therapy and pulmonary rehabilitation if told by your doctor. If you need home oxygen therapy, ask your doctor if you should buy a tool to measure your oxygen level (oximeter).  · Make a COPD action plan with your doctor. This helps you to know what to do if you feel worse than usual.  · Manage any other conditions you have as told by your doctor.  · Avoid going outside when it is very hot, cold, or humid.  · Avoid people who have a sickness you can catch (contagious).  · Keep all follow-up visits as told by your doctor. This is important.  Contact a doctor if:  · You cough up more mucus than usual.  · There is a change in the color or thickness of the mucus.  · It is harder to breathe than usual.  · Your breathing is faster than usual.  · You have trouble sleeping.  · You need to use your medicines more often than usual.  · You have trouble doing your normal activities such as getting dressed or walking around the  house.  Get help right away if:  · You have shortness of breath while resting.  · You have shortness of breath that stops you from:  ? Being able to talk.  ? Doing normal activities.  · Your chest hurts for longer than 5 minutes.  · Your skin color is more blue than usual.  · Your pulse oximeter shows that you have low oxygen for longer than 5 minutes.  · You have a fever.  · You feel too tired to breathe normally.  Summary  · Chronic obstructive pulmonary disease (COPD) is a long-term lung problem.  · The way your lungs work will never return to normal. Usually the condition gets worse over time. There are things you can do to keep yourself as healthy as possible.  · Take over-the-counter and prescription medicines only as told by your doctor.  · If you smoke, stop. Smoking makes the problem worse.  This information is not intended to replace advice given to you by your health care provider. Make sure you discuss any questions you have with your health care provider.  Document Released: 06/05/2009 Document Revised: 11/30/2018 Document Reviewed: 01/22/2018  [employer] Patient Education © 2020 [employer]    Escitalopram tablets  What is this medicine?  ESCITALOPRAM (es sye ABIEL oh pram) is used to treat depression and certain types of anxiety.  This medicine may be used for other purposes; ask your health care provider or pharmacist if you have questions.  COMMON BRAND NAME(S): Lexapro  What should I tell my health care provider before I take this medicine?  They need to know if you have any of these conditions:  · bipolar disorder or a family history of bipolar disorder  · diabetes  · glaucoma  · heart disease  · kidney or liver disease  · receiving electroconvulsive therapy  · seizures (convulsions)  · suicidal thoughts, plans, or attempt by you or a family member  · an unusual or allergic reaction to escitalopram, the related drug citalopram, other medicines, foods, dyes, or preservatives  · pregnant or trying to  become pregnant  · breast-feeding  How should I use this medicine?  Take this medicine by mouth with a glass of water. Follow the directions on the prescription label. You can take it with or without food. If it upsets your stomach, take it with food. Take your medicine at regular intervals. Do not take it more often than directed. Do not stop taking this medicine suddenly except upon the advice of your doctor. Stopping this medicine too quickly may cause serious side effects or your condition may worsen.  A special MedGuide will be given to you by the pharmacist with each prescription and refill. Be sure to read this information carefully each time.  Talk to your pediatrician regarding the use of this medicine in children. Special care may be needed.  Overdosage: If you think you have taken too much of this medicine contact a poison control center or emergency room at once.  NOTE: This medicine is only for you. Do not share this medicine with others.  What if I miss a dose?  If you miss a dose, take it as soon as you can. If it is almost time for your next dose, take only that dose. Do not take double or extra doses.  What may interact with this medicine?  Do not take this medicine with any of the following medications:  · certain medicines for fungal infections like fluconazole, itraconazole, ketoconazole, posaconazole, voriconazole  · cisapride  · citalopram  · dronedarone  · linezolid  · MAOIs like Carbex, Eldepryl, Marplan, Nardil, and Parnate  · methylene blue (injected into a vein)  · pimozide  · thioridazine  This medicine may also interact with the following medications:  · alcohol  · amphetamines  · aspirin and aspirin-like medicines  · carbamazepine  · certain medicines for depression, anxiety, or psychotic disturbances  · certain medicines for migraine headache like almotriptan, eletriptan, frovatriptan, naratriptan, rizatriptan, sumatriptan, zolmitriptan  · certain medicines for sleep  · certain  medicines that treat or prevent blood clots like warfarin, enoxaparin, dalteparin  · cimetidine  · diuretics  · dofetilide  · fentanyl  · furazolidone  · isoniazid  · lithium  · metoprolol  · NSAIDs, medicines for pain and inflammation, like ibuprofen or naproxen  · other medicines that prolong the QT interval (cause an abnormal heart rhythm)  · procarbazine  · rasagiline  · supplements like Petey's wort, kava kava, valerian  · tramadol  · tryptophan  · ziprasidone  This list may not describe all possible interactions. Give your health care provider a list of all the medicines, herbs, non-prescription drugs, or dietary supplements you use. Also tell them if you smoke, drink alcohol, or use illegal drugs. Some items may interact with your medicine.  What should I watch for while using this medicine?  Tell your doctor if your symptoms do not get better or if they get worse. Visit your doctor or health care professional for regular checks on your progress. Because it may take several weeks to see the full effects of this medicine, it is important to continue your treatment as prescribed by your doctor.  Patients and their families should watch out for new or worsening thoughts of suicide or depression. Also watch out for sudden changes in feelings such as feeling anxious, agitated, panicky, irritable, hostile, aggressive, impulsive, severely restless, overly excited and hyperactive, or not being able to sleep. If this happens, especially at the beginning of treatment or after a change in dose, call your health care professional.  You may get drowsy or dizzy. Do not drive, use machinery, or do anything that needs mental alertness until you know how this medicine affects you. Do not stand or sit up quickly, especially if you are an older patient. This reduces the risk of dizzy or fainting spells. Alcohol may interfere with the effect of this medicine. Avoid alcoholic drinks.  Your mouth may get dry. Chewing sugarless  gum or sucking hard candy, and drinking plenty of water may help. Contact your doctor if the problem does not go away or is severe.  What side effects may I notice from receiving this medicine?  Side effects that you should report to your doctor or health care professional as soon as possible:  · allergic reactions like skin rash, itching or hives, swelling of the face, lips, or tongue  · anxious  · black, tarry stools  · changes in vision  · confusion  · elevated mood, decreased need for sleep, racing thoughts, impulsive behavior  · eye pain  · fast, irregular heartbeat  · feeling faint or lightheaded, falls  · feeling agitated, angry, or irritable  · hallucination, loss of contact with reality  · loss of balance or coordination  · loss of memory  · painful or prolonged erections  · restlessness, pacing, inability to keep still  · seizures  · stiff muscles  · suicidal thoughts or other mood changes  · trouble sleeping  · unusual bleeding or bruising  · unusually weak or tired  · vomiting  Side effects that usually do not require medical attention (report to your doctor or health care professional if they continue or are bothersome):  · changes in appetite  · change in sex drive or performance  · headache  · increased sweating  · indigestion, nausea  · tremors  This list may not describe all possible side effects. Call your doctor for medical advice about side effects. You may report side effects to FDA at 9-698-FDA-6918.  Where should I keep my medicine?  Keep out of reach of children.  Store at room temperature between 15 and 30 degrees C (59 and 86 degrees F). Throw away any unused medicine after the expiration date.  NOTE: This sheet is a summary. It may not cover all possible information. If you have questions about this medicine, talk to your doctor, pharmacist, or health care provider.  © 2020 [employer] (2019-12-09 11:21:44)

## 2021-05-25 ENCOUNTER — TELEPHONE (OUTPATIENT)
Dept: HEMATOLOGY ONCOLOGY | Facility: MEDICAL CENTER | Age: 61
End: 2021-05-25

## 2021-05-25 NOTE — TELEPHONE ENCOUNTER
Received referral to lung cancer screening program.  Chart review to assess for lung cancer screening program eligibility.   1. Age 55-77 yrs of age? Yes 60 y.o.  2. 30 pack year hx of smoking, or greater? Yes 1 [id number]= [id number]  3. Current smoker or if quit, has pt quit within last 15 yrs?Yes  Current smoker  4. Any signs or symptoms of lung cancer? None noted  5. Previous history of lung cancer? None noted  6. Chest CT within past 12 mos.? None noted  Patient does meet eligibility criteria. [facility] scheduling notified to schedule the shared decision making visit.

## 2021-05-26 ENCOUNTER — HOSPITAL ENCOUNTER (OUTPATIENT)
Dept: LAB | Facility: MEDICAL CENTER | Age: 61
End: 2021-05-26
Attending: NURSE PRACTITIONER
Payer: COMMERCIAL

## 2021-05-26 DIAGNOSIS — F41.1 GAD (GENERALIZED ANXIETY DISORDER): ICD-10-CM

## 2021-05-26 DIAGNOSIS — Z13.6 SCREENING FOR CARDIOVASCULAR CONDITION: ICD-10-CM

## 2021-05-26 DIAGNOSIS — J44.89 COPD (CHRONIC OBSTRUCTIVE PULMONARY DISEASE) WITH CHRONIC BRONCHITIS (HCC): ICD-10-CM

## 2021-05-26 DIAGNOSIS — Z11.59 NEED FOR HEPATITIS C SCREENING TEST: ICD-10-CM

## 2021-05-26 DIAGNOSIS — F17.210 TOBACCO DEPENDENCE DUE TO CIGARETTES: ICD-10-CM

## 2021-05-26 PROCEDURE — G0472 HEP C SCREEN HIGH RISK/OTHER: HCPCS

## 2021-05-26 PROCEDURE — 80053 COMPREHEN METABOLIC PANEL: CPT

## 2021-05-26 PROCEDURE — 36415 COLL VENOUS BLD VENIPUNCTURE: CPT

## 2021-05-26 PROCEDURE — 85025 COMPLETE CBC W/AUTO DIFF WBC: CPT

## 2021-05-26 PROCEDURE — 80061 LIPID PANEL: CPT

## 2021-05-27 LAB
ALBUMIN SERPL BCP-MCNC: 4.3 G/DL (ref 3.2–4.9)
ALBUMIN/GLOB SERPL: 1.5 G/DL
ALP SERPL-CCNC: 101 U/L (ref 30–99)
ALT SERPL-CCNC: 17 U/L (ref 2–50)
ANION GAP SERPL CALC-SCNC: 9 MMOL/L (ref 7–16)
AST SERPL-CCNC: 17 U/L (ref 12–45)
BASOPHILS # BLD AUTO: 0.9 % (ref 0–1.8)
BASOPHILS # BLD: 0.08 K/UL (ref 0–0.12)
BILIRUB SERPL-MCNC: 0.5 MG/DL (ref 0.1–1.5)
BUN SERPL-MCNC: 11 MG/DL (ref 8–22)
CALCIUM SERPL-MCNC: 9.1 MG/DL (ref 8.5–10.5)
CHLORIDE SERPL-SCNC: 102 MMOL/L (ref 96–112)
CHOLEST SERPL-MCNC: 232 MG/DL (ref 100–199)
CO2 SERPL-SCNC: 28 MMOL/L (ref 20–33)
CREAT SERPL-MCNC: 0.7 MG/DL (ref 0.5–1.4)
EOSINOPHIL # BLD AUTO: 0.24 K/UL (ref 0–0.51)
EOSINOPHIL NFR BLD: 2.8 % (ref 0–6.9)
ERYTHROCYTE [DISTWIDTH] IN BLOOD BY AUTOMATED COUNT: 49.3 FL (ref 35.9–50)
FASTING STATUS PATIENT QL REPORTED: NORMAL
GLOBULIN SER CALC-MCNC: 2.8 G/DL (ref 1.9–3.5)
GLUCOSE SERPL-MCNC: 89 MG/DL (ref 65–99)
HCT VFR BLD AUTO: 49 % (ref 37–47)
HCV AB SER QL: NORMAL
HDLC SERPL-MCNC: 53 MG/DL
HGB BLD-MCNC: 15.9 G/DL (ref 12–16)
IMM GRANULOCYTES # BLD AUTO: 0.05 K/UL (ref 0–0.11)
IMM GRANULOCYTES NFR BLD AUTO: 0.6 % (ref 0–0.9)
LDLC SERPL CALC-MCNC: 161 MG/DL
LYMPHOCYTES # BLD AUTO: 2.03 K/UL (ref 1–4.8)
LYMPHOCYTES NFR BLD: 23.5 % (ref 22–41)
MCH RBC QN AUTO: 32.3 PG (ref 27–33)
MCHC RBC AUTO-ENTMCNC: 32.4 G/DL (ref 33.6–35)
MCV RBC AUTO: 99.6 FL (ref 81.4–97.8)
MONOCYTES # BLD AUTO: 0.58 K/UL (ref 0–0.85)
MONOCYTES NFR BLD AUTO: 6.7 % (ref 0–13.4)
NEUTROPHILS # BLD AUTO: 5.64 K/UL (ref 2–7.15)
NEUTROPHILS NFR BLD: 65.5 % (ref 44–72)
NRBC # BLD AUTO: 0 K/UL
NRBC BLD-RTO: 0 /100 WBC
PLATELET # BLD AUTO: 254 K/UL (ref 164–446)
PMV BLD AUTO: 12.1 FL (ref 9–12.9)
POTASSIUM SERPL-SCNC: 5.1 MMOL/L (ref 3.6–5.5)
PROT SERPL-MCNC: 7.1 G/DL (ref 6–8.2)
RBC # BLD AUTO: 4.92 M/UL (ref 4.2–5.4)
SODIUM SERPL-SCNC: 139 MMOL/L (ref 135–145)
TRIGL SERPL-MCNC: 91 MG/DL (ref 0–149)
WBC # BLD AUTO: 8.6 K/UL (ref 4.8–10.8)

## 2021-06-02 ENCOUNTER — OFFICE VISIT (OUTPATIENT)
Dept: MEDICAL GROUP | Facility: PHYSICIAN GROUP | Age: 61
End: 2021-06-02
Payer: COMMERCIAL

## 2021-06-02 VITALS
SYSTOLIC BLOOD PRESSURE: 104 MMHG | HEIGHT: 62 IN | OXYGEN SATURATION: 91 % | BODY MASS INDEX: 30.59 KG/M2 | HEART RATE: 94 BPM | TEMPERATURE: 96.7 F | DIASTOLIC BLOOD PRESSURE: 66 MMHG | RESPIRATION RATE: 20 BRPM | WEIGHT: 166.2 LBS

## 2021-06-02 DIAGNOSIS — I10 ESSENTIAL HYPERTENSION: ICD-10-CM

## 2021-06-02 DIAGNOSIS — J44.89 COPD (CHRONIC OBSTRUCTIVE PULMONARY DISEASE) WITH CHRONIC BRONCHITIS (HCC): ICD-10-CM

## 2021-06-02 DIAGNOSIS — F33.0 MILD EPISODE OF RECURRENT MAJOR DEPRESSIVE DISORDER (HCC): ICD-10-CM

## 2021-06-02 DIAGNOSIS — F41.1 GAD (GENERALIZED ANXIETY DISORDER): ICD-10-CM

## 2021-06-02 DIAGNOSIS — F17.210 TOBACCO DEPENDENCE DUE TO CIGARETTES: ICD-10-CM

## 2021-06-02 DIAGNOSIS — E78.2 MIXED DYSLIPIDEMIA: ICD-10-CM

## 2021-06-02 DIAGNOSIS — E66.9 OBESITY (BMI 30-39.9): ICD-10-CM

## 2021-06-02 PROCEDURE — 99214 OFFICE O/P EST MOD 30 MIN: CPT | Performed by: NURSE PRACTITIONER

## 2021-06-02 RX ORDER — LISINOPRIL 10 MG/1
10 TABLET ORAL DAILY
Qty: 90 TABLET | Refills: 3 | Status: SHIPPED | OUTPATIENT
Start: 2021-06-02 | End: 2022-05-23

## 2021-06-02 RX ORDER — ATORVASTATIN CALCIUM 20 MG/1
20 TABLET, FILM COATED ORAL EVERY EVENING
Qty: 90 TABLET | Refills: 1 | Status: SHIPPED | OUTPATIENT
Start: 2021-06-02 | End: 2021-11-15 | Stop reason: SDUPTHER

## 2021-06-02 ASSESSMENT — FIBROSIS 4 INDEX: FIB4 SCORE: 0.96

## 2021-06-02 NOTE — ASSESSMENT & PLAN NOTE
This is a chronic and stable condition.  She indicates that she did not start her Lexapro as prescribed at last visit and has not used her hydroxyzine.  She has used her long-acting COPD inhalers which is helped with her anxiety significantly.  Education was given about use of hydroxyzine as needed and not to start the Lexapro as her anxiety and depression is much better.  We will continue to monitor future appointments.

## 2021-06-02 NOTE — PATIENT INSTRUCTIONS
Chronic Obstructive Pulmonary Disease  Chronic obstructive pulmonary disease (COPD) is a long-term (chronic) lung problem. When you have COPD, it is hard for air to get in and out of your lungs. Usually the condition gets worse over time, and your lungs will never return to normal. There are things you can do to keep yourself as healthy as possible.  · Your doctor may treat your condition with:  ? Medicines.  ? Oxygen.  ? Lung surgery.  · Your doctor may also recommend:  ? Rehabilitation. This includes steps to make your body work better. It may involve a team of specialists.  ? Quitting smoking, if you smoke.  ? Exercise and changes to your diet.  ? Comfort measures (palliative care).  Follow these instructions at home:  Medicines  · Take over-the-counter and prescription medicines only as told by your doctor.  · Talk to your doctor before taking any cough or allergy medicines. You may need to avoid medicines that cause your lungs to be dry.  Lifestyle  · If you smoke, stop. Smoking makes the problem worse. If you need help quitting, ask your doctor.  · Avoid being around things that make your breathing worse. This may include smoke, chemicals, and fumes.  · Stay active, but remember to rest as well.  · Learn and use tips on how to relax.  · Make sure you get enough sleep. Most adults need at least 7 hours of sleep every night.  · Eat healthy foods. Eat smaller meals more often. Rest before meals.  Controlled breathing  Learn and use tips on how to control your breathing as told by your doctor. Try:  · Breathing in (inhaling) through your nose for 1 second. Then, pucker your lips and breath out (exhale) through your lips for 2 seconds.  · Putting one hand on your belly (abdomen). Breathe in slowly through your nose for 1 second. Your hand on your belly should move out. Pucker your lips and breathe out slowly through your lips. Your hand on your belly should move in as you breathe out.    Controlled coughing  Learn  and use controlled coughing to clear mucus from your lungs. Follow these steps:  1. Lean your head a little forward.  2. Breathe in deeply.  3. Try to hold your breath for 3 seconds.  4. Keep your mouth slightly open while coughing 2 times.  5. Spit any mucus out into a tissue.  6. Rest and do the steps again 1 or 2 times as needed.  General instructions  · Make sure you get all the shots (vaccines) that your doctor recommends. Ask your doctor about a flu shot and a pneumonia shot.  · Use oxygen therapy and pulmonary rehabilitation if told by your doctor. If you need home oxygen therapy, ask your doctor if you should buy a tool to measure your oxygen level (oximeter).  · Make a COPD action plan with your doctor. This helps you to know what to do if you feel worse than usual.  · Manage any other conditions you have as told by your doctor.  · Avoid going outside when it is very hot, cold, or humid.  · Avoid people who have a sickness you can catch (contagious).  · Keep all follow-up visits as told by your doctor. This is important.  Contact a doctor if:  · You cough up more mucus than usual.  · There is a change in the color or thickness of the mucus.  · It is harder to breathe than usual.  · Your breathing is faster than usual.  · You have trouble sleeping.  · You need to use your medicines more often than usual.  · You have trouble doing your normal activities such as getting dressed or walking around the house.  Get help right away if:  · You have shortness of breath while resting.  · You have shortness of breath that stops you from:  ? Being able to talk.  ? Doing normal activities.  · Your chest hurts for longer than 5 minutes.  · Your skin color is more blue than usual.  · Your pulse oximeter shows that you have low oxygen for longer than 5 minutes.  · You have a fever.  · You feel too tired to breathe normally.  Summary  · Chronic obstructive pulmonary disease (COPD) is a long-term lung problem.  · The way your  lungs work will never return to normal. Usually the condition gets worse over time. There are things you can do to keep yourself as healthy as possible.  · Take over-the-counter and prescription medicines only as told by your doctor.  · If you smoke, stop. Smoking makes the problem worse.  This information is not intended to replace advice given to you by your health care provider. Make sure you discuss any questions you have with your health care provider.  Document Released: 06/05/2009 Document Revised: 11/30/2018 Document Reviewed: 01/22/2018  Elsevier Patient Education © 2020 [employer]      High Cholesterol    High cholesterol is a condition in which the blood has high levels of a white, waxy, fat-like substance (cholesterol). The human body needs small amounts of cholesterol. The liver makes all the cholesterol that the body needs. Extra (excess) cholesterol comes from the food that we eat.  Cholesterol is carried from the liver by the blood through the blood vessels. If you have high cholesterol, deposits (plaques) may build up on the walls of your blood vessels (arteries). Plaques make the arteries narrower and stiffer. Cholesterol plaques increase your risk for heart attack and stroke. Work with your health care provider to keep your cholesterol levels in a healthy range.  What increases the risk?  This condition is more likely to develop in people who:  · Eat foods that are high in animal fat (saturated fat) or cholesterol.  · Are overweight.  · Are not getting enough exercise.  · Have a family history of high cholesterol.  What are the signs or symptoms?  There are no symptoms of this condition.  How is this diagnosed?  This condition may be diagnosed from the results of a blood test.  · If you are older than age 20, your health care provider may check your cholesterol every 4-6 years.  · You may be checked more often if you already have high cholesterol or other risk factors for heart disease.  The  "blood test for cholesterol measures:  · \"Bad\" cholesterol (LDL cholesterol). This is the main type of cholesterol that causes heart disease. The desired level for LDL is less than 100.  · \"Good\" cholesterol (HDL cholesterol). This type helps to protect against heart disease by cleaning the arteries and carrying the LDL away. The desired level for HDL is 60 or higher.  · Triglycerides. These are fats that the body can store or burn for energy. The desired number for triglycerides is lower than 150.  · Total cholesterol. This is a measure of the total amount of cholesterol in your blood, including LDL cholesterol, HDL cholesterol, and triglycerides. A healthy number is less than 200.  How is this treated?  This condition is treated with diet changes, lifestyle changes, and medicines.  Diet changes  · This may include eating more whole grains, fruits, vegetables, nuts, and fish.  · This may also include cutting back on red meat and foods that have a lot of added sugar.  Lifestyle changes  · Changes may include getting at least 40 minutes of aerobic exercise 3 times a week. Aerobic exercises include walking, biking, and swimming. Aerobic exercise along with a healthy diet can help you maintain a healthy weight.  · Changes may also include quitting smoking.  Medicines  · Medicines are usually given if diet and lifestyle changes have failed to reduce your cholesterol to healthy levels.  · Your health care provider may prescribe a statin medicine. Statin medicines have been shown to reduce cholesterol, which can reduce the risk of heart disease.  Follow these instructions at home:  Eating and drinking  If told by your health care provider:  · Eat chicken (without skin), fish, veal, shellfish, ground turkey breast, and round or loin cuts of red meat.  · Do not eat fried foods or fatty meats, such as hot dogs and salami.  · Eat plenty of fruits, such as apples.  · Eat plenty of vegetables, such as broccoli, potatoes, and " carrots.  · Eat beans, peas, and lentils.  · Eat grains such as barley, rice, couscous, and bulgur wheat.  · Eat pasta without cream sauces.  · Use skim or nonfat milk, and eat low-fat or nonfat yogurt and cheeses.  · Do not eat or drink whole milk, cream, ice cream, egg yolks, or hard cheeses.  · Do not eat stick margarine or tub margarines that contain trans fats (also called partially hydrogenated oils).  · Do not eat saturated tropical oils, such as coconut oil and palm oil.  · Do not eat cakes, cookies, crackers, or other baked goods that contain trans fats.    General instructions  · Exercise as directed by your health care provider. Increase your activity level with activities such as gardening, walking, and taking the stairs.  · Take over-the-counter and prescription medicines only as told by your health care provider.  · Do not use any products that contain nicotine or tobacco, such as cigarettes and e-cigarettes. If you need help quitting, ask your health care provider.  · Keep all follow-up visits as told by your health care provider. This is important.  Contact a health care provider if:  · You are struggling to maintain a healthy diet or weight.  · You need help to start on an exercise program.  · You need help to stop smoking.  Get help right away if:  · You have chest pain.  · You have trouble breathing.  This information is not intended to replace advice given to you by your health care provider. Make sure you discuss any questions you have with your health care provider.  Document Released: 12/18/2006 Document Revised: 12/21/2018 Document Reviewed: 06/17/2017  [employer] Patient Education © 2020 [employer]

## 2021-06-02 NOTE — PROGRESS NOTES
Chief Complaint   Patient presents with   • Follow-Up     2 wk fv, imaging and labs       Subjective:     HPI:   Kristel Sewell is a 60 y.o. female here to discuss the evaluation and management of:        COPD (chronic obstructive pulmonary disease) with chronic bronchitis (HCC)  This is a chronic and controlled condition.  Last visit patient was placed on Spiriva and Advair for long-term control of COPD symptoms with as needed albuterol.  She indicates that she is breathing much better now and is not as short of breath or anxious.  She reports that she only uses albuterol once every 3 to 4 days.  She does continue to use oxygen at night however does not need to use it throughout the day.  Patient does continue to smoke cigarettes and  was given to quit however she states she is not ready to quit at this time.  She indicates that she has a pulmonology appointment for August and strict ER precautions were given in case of COPD exacerbation.        Essential hypertension  This is a chronic and stable condition.  At last visit patient was placed on lisinopril 10 mg daily.  Blood pressures well controlled today at 104/66.  Patient denies any signs or symptoms of hyper or hypotension.  Patient is tolerating lisinopril well and denies any side effects.  No change in medication treatment plan today.    GILL (generalized anxiety disorder)  This is a chronic and stable condition.  She indicates that she did not start her Lexapro as prescribed at last visit and has not used her hydroxyzine.  She has used her long-acting COPD inhalers which is helped with her anxiety significantly.  Education was given about use of hydroxyzine as needed and not to start the Lexapro as her anxiety and depression is much better.  We will continue to monitor future appointments.    Mild episode of recurrent major depressive disorder (HCC)  This is a chronic and stable condition.  Patient Antonio that her depression is much better now  that she is able to breathe and have more control of her life.  Lexapro prescription was never started per patient preference.  She denies any suicidal or homicidal ideations today.  We will continue to monitor at future appointments.    Mixed dyslipidemia  This is a new condition.  I have reviewed labs and sister ASCVD risk score.  Lab Results   Component Value Date/Time    CHOLSTRLTOT 232 (H) 05/26/2021 09:10 AM     (H) 05/26/2021 09:10 AM    HDL 53 05/26/2021 09:10 AM    TRIGLYCERIDE 91 05/26/2021 09:10 AM     The 10-year ASCVD risk score (Juliet ELIZONDO Jr., et al., 2013) is: 6.6%    Values used to calculate the score:      Age: 60 years      Sex: Female      Is Non- : No      Diabetic: No      Tobacco smoker: Yes      Systolic Blood Pressure: 104 mmHg      Is BP treated: Yes      HDL Cholesterol: 53 mg/dL      Total Cholesterol: 232 mg/dL    Patient is agreeable to starting statin medication today.  Lipitor was prescribed today and patient was educated about possible side effects.      Tobacco dependence due to cigarettes  This is a chronic condition and patient continues to smoke cigarettes.  She states that she does understand the risks of continuing to smoke cigarettes however she does not want to quit today.   was given for smoking cessation  Patient indicates that she will ask when she is ready to quit smoking about further information.          ROS:  Gen: no fevers/chills, no changes in weight  Eyes: no changes in vision  ENT: no sore throat,   Pulm: Positive per HPI  CV: no chest pain, no palpitations  GI: no nausea/vomiting, no diarrhea  Neuro: no headaches, no numbness/tingling  Heme/Lymph: no easy bruising    Allergies   Allergen Reactions   • Penicillins        Current medicines (including changes today)  Current Outpatient Medications   Medication Sig Dispense Refill   • atorvastatin (LIPITOR) 20 MG Tab Take 1 tablet by mouth every evening. 90 tablet 1   • lisinopril  (PRINIVIL) 10 MG Tab Take 1 tablet by mouth every day. 90 tablet 3   • fluticasone-salmeterol (ADVAIR HFA) 230-21 MCG/ACT inhaler Inhale 2 Puffs 2 times a day. 12 g 11   • tiotropium (SPIRIVA RESPIMAT) 2.5 mcg/Act Aero Soln Inhale 2 Inhalation every day. 4 g 11   • albuterol (PROAIR HFA) 108 (90 Base) MCG/ACT Aero Soln inhalation aerosol Inhale 2 Puffs every 6 hours as needed for Shortness of Breath. 18 g 6   • hydrOXYzine HCl (ATARAX) 25 MG Tab Take 1 tablet by mouth 3 times a day as needed for Anxiety. 30 tablet 0   • albuterol 108 (90 Base) MCG/ACT Aero Soln inhalation aerosol 2 PUFFS EVERY 4 HOURS ONLY IF NEEDED FOR COUGH, WHEEZING, OR SHORTNESS OF BREATH. 8.5 g 5     No current facility-administered medications for this visit.       Social History     Tobacco Use   • Smoking status: Current Every Day Smoker     Packs/day: 1.00     Years: 40.00     Pack years: 40.00     Start date: 5/19/1991   • Smokeless tobacco: Never Used   Vaping Use   • Vaping Use: Never used   Substance Use Topics   • Alcohol use: Yes     Alcohol/week: 18.0 oz     Types: 30 Cans of beer per week     Comment: 3 day 12-16 or bottles   • Drug use: Not Currently       Patient Active Problem List    Diagnosis Date Noted   • Mixed dyslipidemia 06/02/2021   • Obesity (BMI 30-39.9) 06/02/2021   • GILL (generalized anxiety disorder) 05/19/2021   • Mild episode of recurrent major depressive disorder (HCC) 05/19/2021   • COPD (chronic obstructive pulmonary disease) with chronic bronchitis (HCC) 05/19/2021   • Tobacco dependence due to cigarettes 05/19/2021   • Essential hypertension 05/19/2021       Family History   Problem Relation Age of Onset   • Kidney Disease Mother    • Heart Disease Mother    • Stroke Mother    • Diabetes Mother    • Diabetes Father    • Kidney Disease Father    • Arrythmia Father           Objective:     /66 (BP Location: Left arm, Patient Position: Sitting)   Pulse 94   Temp 35.9 °C (96.7 °F) (Temporal)   Resp 20    "Ht 1.575 m (5' 2\")   Wt 75.4 kg (166 lb 3.2 oz)   SpO2 91%  Body mass index is 30.4 kg/m².    Physical Exam:  Constitutional: Well-developed and well-nourished female in NAD. Not diaphoretic. No distress.   Skin: warm, dry, intact, no evidence of rash or concerning lesions  Head: Atraumatic without lesions.  Eyes: Conjunctivae and extraocular motions are normal. Pupils are equal, round, and reactive to light. No scleral icterus. .  Neck: Supple, trachea midline. No thyromegaly present. No cervical or supraclavicular lymphadenopathy.  Cardiovascular: Regular rate and rhythm without murmur. Carotid and radial pulses are intact and equal bilaterally.  Pulmonary: Clear but diminished to ausculation.  Increased effort with exertion effort. No rales, ronchi, or wheezing.  Abdomen: Soft, non tender, and without distention. Active bowel sounds in all four quadrants. No rebound, guarding, masses or hepatosplenomegaly.  Extremities: No cyanosis, clubbing, erythema, nor edema.   Neurological: Alert and oriented x 3.  Mild intention tremor.  No cranial nerve deficit. 5/5 myotomes. Sensation intact.   Psychiatric:  Behavior, mood, and affect are appropriate.       Assessment and Plan:     The following treatment plan was discussed:    1. COPD (chronic obstructive pulmonary disease) with chronic bronchitis (HCC)  Patient will continue on current medication and treatment plan.  Encouraged her to keep her appointment with pulmonology in August.  Encouraged her to keep her appointment for lung cancer screening.    2. Mixed dyslipidemia  - atorvastatin (LIPITOR) 20 MG Tab; Take 1 tablet by mouth every evening.  Dispense: 90 tablet; Refill: 1    3. Essential hypertension  - lisinopril (PRINIVIL) 10 MG Tab; Take 1 tablet by mouth every day.  Dispense: 90 tablet; Refill: 3    4. GILL (generalized anxiety disorder)    5. Mild episode of recurrent major depressive disorder (HCC)    6. Tobacco dependence due to cigarettes  Ready to quit: " No  Counseling given: Yes      7. Obesity (BMI 30-39.9)  - Patient identified as having weight management issue.  Appropriate orders and counseling given.     Any change or worsening of signs or symptoms, patient encouraged to follow-up or report to emergency room for further evaluation. Patient verbalizes understanding and agrees.    Follow-Up: Return in about 3 months (around 9/2/2021) for COPD.      PLEASE NOTE: This dictation was created using voice recognition software. I have made every reasonable attempt to correct obvious errors, but I expect that there are errors of grammar and possibly content that I did not discover before finalizing the note.

## 2021-06-02 NOTE — ASSESSMENT & PLAN NOTE
This is a chronic and stable condition.  At last visit patient was placed on lisinopril 10 mg daily.  Blood pressures well controlled today at 104/66.  Patient denies any signs or symptoms of hyper or hypotension.  Patient is tolerating lisinopril well and denies any side effects.  No change in medication treatment plan today.

## 2021-06-02 NOTE — ASSESSMENT & PLAN NOTE
This is a chronic and stable condition.  Patient Antonio that her depression is much better now that she is able to breathe and have more control of her life.  Lexapro prescription was never started per patient preference.  She denies any suicidal or homicidal ideations today.  We will continue to monitor at future appointments.

## 2021-06-02 NOTE — ASSESSMENT & PLAN NOTE
This is a chronic and controlled condition.  Last visit patient was placed on Spiriva and Advair for long-term control of COPD symptoms with as needed albuterol.  She indicates that she is breathing much better now and is not as short of breath or anxious.  She reports that she only uses albuterol once every 3 to 4 days.  She does continue to use oxygen at night however does not need to use it throughout the day.  Patient does continue to smoke cigarettes and  was given to quit however she states she is not ready to quit at this time.  She indicates that she has a pulmonology appointment for August and strict ER precautions were given in case of COPD exacerbation.

## 2021-06-02 NOTE — ASSESSMENT & PLAN NOTE
This is a new condition.  I have reviewed labs and sister ASCVD risk score.  Lab Results   Component Value Date/Time    CHOLSTRLTOT 232 (H) 05/26/2021 09:10 AM     (H) 05/26/2021 09:10 AM    HDL 53 05/26/2021 09:10 AM    TRIGLYCERIDE 91 05/26/2021 09:10 AM     The 10-year ASCVD risk score (Juliet ELIZONDO Jr., et al., 2013) is: 6.6%    Values used to calculate the score:      Age: 60 years      Sex: Female      Is Non- : No      Diabetic: No      Tobacco smoker: Yes      Systolic Blood Pressure: 104 mmHg      Is BP treated: Yes      HDL Cholesterol: 53 mg/dL      Total Cholesterol: 232 mg/dL    Patient is agreeable to starting statin medication today.  Lipitor was prescribed today and patient was educated about possible side effects.

## 2021-06-02 NOTE — ASSESSMENT & PLAN NOTE
This is a chronic condition and patient continues to smoke cigarettes.  She states that she does understand the risks of continuing to smoke cigarettes however she does not want to quit today.   was given for smoking cessation  Patient indicates that she will ask when she is ready to quit smoking about further information.

## 2021-06-08 ENCOUNTER — OFFICE VISIT (OUTPATIENT)
Dept: HEMATOLOGY ONCOLOGY | Facility: MEDICAL CENTER | Age: 61
End: 2021-06-08
Payer: COMMERCIAL

## 2021-06-08 VITALS
HEART RATE: 92 BPM | SYSTOLIC BLOOD PRESSURE: 150 MMHG | BODY MASS INDEX: 31.34 KG/M2 | DIASTOLIC BLOOD PRESSURE: 80 MMHG | HEIGHT: 61 IN | WEIGHT: 166.01 LBS | OXYGEN SATURATION: 93 % | RESPIRATION RATE: 16 BRPM | TEMPERATURE: 98.2 F

## 2021-06-08 DIAGNOSIS — F17.210 CIGARETTE SMOKER: ICD-10-CM

## 2021-06-08 PROCEDURE — G0296 VISIT TO DETERM LDCT ELIG: HCPCS | Performed by: NURSE PRACTITIONER

## 2021-06-08 ASSESSMENT — PAIN SCALES - GENERAL: PAINLEVEL: NO PAIN

## 2021-06-08 ASSESSMENT — FIBROSIS 4 INDEX: FIB4 SCORE: 0.96

## 2021-06-08 ASSESSMENT — ENCOUNTER SYMPTOMS
SPUTUM PRODUCTION: 0
WHEEZING: 1
COUGH: 1
SHORTNESS OF BREATH: 1
WEIGHT LOSS: 0
HEMOPTYSIS: 0

## 2021-06-08 NOTE — PROGRESS NOTES
Subjective:      Kristel Sewell is a 60 y.o. female who presents with Lung Cancer Screening Program Prescreen ([facility]) for lung cancer screening shared decision making visit.         HPI    Patient seen today for initial lung cancer screening visit. Patient referred by her PCP JORGE Plaza.     The patient meets eligibility criteria including age, smoking history (30+ pack years), if former smoker, quit in the last 15 years, and absence of signs or symptoms of lung cancer.    - Age - 60  - Smoking history - Patient has smoked for 40 years at an average of 1.5 ppd = 60 pack year smoking history.  - Current smoking status - Current smoker - interested in quitting. Plans to work with her PCP for assistance.   - No symptoms of lung cancer and no previous history of lung cancer       Allergies   Allergen Reactions   • Penicillins      Current Outpatient Medications on File Prior to Visit   Medication Sig Dispense Refill   • atorvastatin (LIPITOR) 20 MG Tab Take 1 tablet by mouth every evening. 90 tablet 1   • lisinopril (PRINIVIL) 10 MG Tab Take 1 tablet by mouth every day. 90 tablet 3   • fluticasone-salmeterol (ADVAIR HFA) 230-21 MCG/ACT inhaler Inhale 2 Puffs 2 times a day. 12 g 11   • tiotropium (SPIRIVA RESPIMAT) 2.5 mcg/Act Aero Soln Inhale 2 Inhalation every day. 4 g 11   • albuterol (PROAIR HFA) 108 (90 Base) MCG/ACT Aero Soln inhalation aerosol Inhale 2 Puffs every 6 hours as needed for Shortness of Breath. 18 g 6   • hydrOXYzine HCl (ATARAX) 25 MG Tab Take 1 tablet by mouth 3 times a day as needed for Anxiety. 30 tablet 0   • albuterol 108 (90 Base) MCG/ACT Aero Soln inhalation aerosol 2 PUFFS EVERY 4 HOURS ONLY IF NEEDED FOR COUGH, WHEEZING, OR SHORTNESS OF BREATH. 8.5 g 5     No current facility-administered medications on file prior to visit.       Review of Systems   Constitutional: Positive for malaise/fatigue. Negative for weight loss.   Respiratory: Positive for cough, shortness of  "breath (with activity) and wheezing. Negative for hemoptysis and sputum production.           Objective:     /80   Pulse 92   Temp 36.8 °C (98.2 °F) (Temporal)   Resp 16   Ht 1.537 m (5' 0.5\")   Wt 75.3 kg (166 lb 0.1 oz)   SpO2 93%   BMI 31.89 kg/m²      Physical Exam  Vitals reviewed.   Constitutional:       General: She is not in acute distress.     Appearance: Normal appearance. She is well-developed. She is not diaphoretic.   HENT:      Head: Normocephalic and atraumatic.   Cardiovascular:      Rate and Rhythm: Normal rate and regular rhythm.      Heart sounds: Normal heart sounds. No murmur heard.   No friction rub. No gallop.    Pulmonary:      Effort: Pulmonary effort is normal. No respiratory distress.      Breath sounds: Wheezing (expiratory wheezing noted in the RLL) present.      Comments: Diminished throughout  Musculoskeletal:         General: Normal range of motion.   Skin:     General: Skin is warm and dry.   Neurological:      Mental Status: She is alert and oriented to person, place, and time.              Assessment/Plan:        1. Cigarette smoker  CT-LUNG CANCER-SCREENING       We conducted a shared decision-making process using a decision aid. We reviewed benefits and harms of screening, including false positives and potential need for additional diagnostic testing, the possibility of over diagnosis, and total radiation exposure.    We discussed the importance of adhering to annual LDCT screening. We also discussed the impact of comorbities on the patient's the ability or willingness to undergo diagnostic procedure(s) and treatment.    Counseling on the importance of maintaining cigarette smoking abstinence if former smoker; or the importance of smoking cessation if current smoker and, if appropriate, furnishing of information about tobacco cessation interventions. I provided patient with smoking cessation materials and resources within [facility] and the community. Patient " appreciative of the resources.     Based on our discussion, we have decided to begin annual lung cancer screening starting now.

## 2021-06-16 ENCOUNTER — HOSPITAL ENCOUNTER (OUTPATIENT)
Dept: RADIOLOGY | Facility: MEDICAL CENTER | Age: 61
End: 2021-06-16
Attending: NURSE PRACTITIONER
Payer: COMMERCIAL

## 2021-06-16 ENCOUNTER — TELEPHONE (OUTPATIENT)
Dept: HEMATOLOGY ONCOLOGY | Facility: MEDICAL CENTER | Age: 61
End: 2021-06-16

## 2021-06-16 DIAGNOSIS — F17.210 CIGARETTE SMOKER: ICD-10-CM

## 2021-06-16 PROCEDURE — 71271 CT THORAX LUNG CANCER SCR C-: CPT

## 2021-06-16 NOTE — LETTER
. [address]  [place] [address]  P [phone]  F [phone]         Date: June 18, 2021    Kristel Sewell  [address] [place] [address]    Re:  Low-dose chest CT performed on 6/16/2021     Medical Record Number: [id number]    Dear Kristel,    We are writing to let you know that the results of your recent low-dose chest CT (LDCT) examination shows one or more lung nodule(s) which are likely benign (not cancer).  Lung nodules are very common and many people without cancer have these nodules.  To make sure these nodule(s) are benign, and remain unchanged, your radiologist recommends you have another low-dose chest CT on or around 6/16/2022 (12 month follow-up). In the event that any additional “incidental” findings were identified from this exam, we have communicated back to your primary care provider for follow-up.    Here are some other important points you should know:  • Your low-dose Chest CT report has been sent to your referring or primary health care provider and is available to participants in [employer].  As a part of our Lung Cancer Screening program we will remind you and your referring health care provider when your next LDCT screening is due.  • Although low-dose chest CT is very effective at finding lung cancer early, it cannot find all lung cancers. If you develop any new symptoms such as shortness of breath, chest pain, or coughing up blood, please call your doctor.  • Please keep in mind that good health involves quitting smoking (for help, call [facility] program at [phone]), an annual physical exam, and continued screening with low-dose chest CT.    Thank you for participating in the Lung Cancer Screening program. If you have any questions about this letter or our program, please call our Nurse at [phone].    Sincerely,  Bina De La Garza MD, [facility]  Medical Director [facility] Lung Cancer Screening Program

## 2021-06-16 NOTE — TELEPHONE ENCOUNTER
Patient recently completed lung cancer screening CT today.  There is a 2 mm left upper lobe pulmonary nodule.  Mild emphysema.  There is also a calcified granuloma in the left upper lobe.  Lung RADS 2, benign appearance and recommend patient continue with annual screening which she will complete with her PCP.    Incidental finding noted a moderate sized hiatal hernia as well as coarse coronary artery calcifications.  Patient to follow-up with PCP for these incidental findings.      CT-LUNG CANCER-SCREENING    Result Date: 6/16/2021 6/16/2021 3:12 PM HISTORY/REASON FOR EXAM:  Lung cancer screening.; Lung cancer screening. 60 pack-year history of smoking TECHNIQUE/EXAM DESCRIPTION AND NUMBER OF VIEWS: Lung cancer screening without contrast. Low dose noncontrast helical images were obtained of the chest from the lung apices through the costophrenic sulci utilizing thin collimation and intervals with reconstructed images sent to PACS in axial, coronal and sagittal planes. Low dose optimization technique was utilized for this CT exam including automated exposure control and adjustment of the mA and/or kV according to patient size. COMPARISON: None. FINDINGS: There is a 2 mm nodule anteriorly in the left upper lobe. There is a calcified granuloma in the left upper lobe. There is mild emphysema. There is atelectasis or scarring in the lingula, right middle lobe and right lower lobe posteriorly. No thoracic lymphadenopathy. There is calcified plaque in the aorta and coronary arteries. No pericardial effusion. No pleural effusion. There is a moderate-sized hiatal hernia. The visualized portions of the upper abdomen are unremarkable. There is a chronic compression deformity involving the superior endplate of L1     1.  2 mm left upper lobe pulmonary nodule. 2.  Coarse coronary artery calcifications. 3.  Mild emphysema. 4.  Moderate size hiatal hernia. 5.  Left upper lobe calcified granuloma. Lung RADS: 2 - Benign Appearance  or Behavior Nodules with a very low likelihood of becoming a clinically active cancer due to size or lack of growth Findings: solid nodule(s): less than 6 mm or new less than 4 mm part solid nodule(s): less than 6 mm total diameter on baseline screening non solid nodule(s) (GGN): less than 30 mm OR greater than or equal to 30 mm and unchanged or slowly growing category 3 or 4 nodules unchanged for greater than or equal to 3 months perifissural nodule(s) less than 10 mm Management: Continue annual screening with LDCT in 12 months

## 2021-06-16 NOTE — TELEPHONE ENCOUNTER
Attempted to reach pt by phone re: LDCT results.  Pt did not answer & unable to leave message d/t voicemail not set up by patient.  Patient also does not have access to [employer].

## 2021-06-17 NOTE — TELEPHONE ENCOUNTER
Called pt again & was able to leave a message with a gentleman whom answered pt's phone.  Left contact info & request for a call back to review results over the phone.

## 2021-07-03 ENCOUNTER — OFFICE VISIT (OUTPATIENT)
Dept: URGENT CARE | Facility: PHYSICIAN GROUP | Age: 61
End: 2021-07-03
Payer: COMMERCIAL

## 2021-07-03 VITALS
RESPIRATION RATE: 14 BRPM | WEIGHT: 158 LBS | HEIGHT: 62 IN | DIASTOLIC BLOOD PRESSURE: 68 MMHG | BODY MASS INDEX: 29.08 KG/M2 | HEART RATE: 79 BPM | SYSTOLIC BLOOD PRESSURE: 104 MMHG | TEMPERATURE: 97.9 F | OXYGEN SATURATION: 96 %

## 2021-07-03 DIAGNOSIS — F41.1 GAD (GENERALIZED ANXIETY DISORDER): ICD-10-CM

## 2021-07-03 PROCEDURE — 99214 OFFICE O/P EST MOD 30 MIN: CPT | Performed by: PHYSICIAN ASSISTANT

## 2021-07-03 RX ORDER — ESCITALOPRAM OXALATE 10 MG/1
10 TABLET ORAL DAILY
COMMUNITY
End: 2021-07-03 | Stop reason: SDUPTHER

## 2021-07-03 RX ORDER — ESCITALOPRAM OXALATE 10 MG/1
10 TABLET ORAL DAILY
Qty: 90 TABLET | Refills: 3 | Status: SHIPPED | OUTPATIENT
Start: 2021-07-03 | End: 2021-09-07

## 2021-07-03 ASSESSMENT — FIBROSIS 4 INDEX: FIB4 SCORE: 0.96

## 2021-07-03 NOTE — PROGRESS NOTES
Chief Complaint   Patient presents with   • Medication Refill     escitalopram 10mg po       HISTORY OF PRESENT ILLNESS: Patient is a 60 y.o. female who presents today for the following:    Started escitalopram 10 mg 1 month ago  Feels it is helping   states he feels it is helping as well, stating she is not crying as much she was previously  Thought she had a years worth of medicine but only had 30 days  Needs a refill  Has an appointment with primary care in 6 months, as recommended by her primary care provider    Patient Active Problem List    Diagnosis Date Noted   • Mixed dyslipidemia 06/02/2021   • Obesity (BMI 30-39.9) 06/02/2021   • GILL (generalized anxiety disorder) 05/19/2021   • Mild episode of recurrent major depressive disorder (HCC) 05/19/2021   • COPD (chronic obstructive pulmonary disease) with chronic bronchitis (HCC) 05/19/2021   • Tobacco dependence due to cigarettes 05/19/2021   • Essential hypertension 05/19/2021       Allergies:Penicillins    Current Outpatient Medications Ordered in Epic   Medication Sig Dispense Refill   • escitalopram (LEXAPRO) 10 MG Tab Take 1 tablet by mouth every day. 90 tablet 3   • atorvastatin (LIPITOR) 20 MG Tab Take 1 tablet by mouth every evening. 90 tablet 1   • lisinopril (PRINIVIL) 10 MG Tab Take 1 tablet by mouth every day. 90 tablet 3   • fluticasone-salmeterol (ADVAIR HFA) 230-21 MCG/ACT inhaler Inhale 2 Puffs 2 times a day. 12 g 11   • tiotropium (SPIRIVA RESPIMAT) 2.5 mcg/Act Aero Soln Inhale 2 Inhalation every day. 4 g 11   • albuterol (PROAIR HFA) 108 (90 Base) MCG/ACT Aero Soln inhalation aerosol Inhale 2 Puffs every 6 hours as needed for Shortness of Breath. 18 g 6   • hydrOXYzine HCl (ATARAX) 25 MG Tab Take 1 tablet by mouth 3 times a day as needed for Anxiety. 30 tablet 0   • albuterol 108 (90 Base) MCG/ACT Aero Soln inhalation aerosol 2 PUFFS EVERY 4 HOURS ONLY IF NEEDED FOR COUGH, WHEEZING, OR SHORTNESS OF BREATH. 8.5 g 5     No current  "[facility]-ordered facility-administered medications on file.       Past Medical History:   Diagnosis Date   • COPD (chronic obstructive pulmonary disease) (HCC)    • Essential hypertension 5/19/2021   • GILL (generalized anxiety disorder) 5/19/2021   • GERD (gastroesophageal reflux disease)    • Mild episode of recurrent major depressive disorder (HCC) 5/19/2021   • Mixed dyslipidemia 6/2/2021       Social History     Tobacco Use   • Smoking status: Current Every Day Smoker     Packs/day: 1.50     Years: 40.00     Pack years: 60.00     Types: Cigarettes   • Smokeless tobacco: Never Used   Vaping Use   • Vaping Use: Never used   Substance Use Topics   • Alcohol use: Yes     Alcohol/week: 18.0 oz     Types: 30 Cans of beer per week     Comment: 3 day 12-16 or bottles   • Drug use: Not Currently       Family Status   Relation Name Status   • Mo  Alive   • Fa  Alive     Family History   Problem Relation Age of Onset   • Kidney Disease Mother    • Heart Disease Mother    • Stroke Mother    • Diabetes Mother    • Diabetes Father    • Kidney Disease Father    • Arrythmia Father        Review of Systems:   Constitutional ROS: No unexpected change in weight  Pulmonary ROS: No chronic cough, sputum, or hemoptysis, No dyspnea on exertion, No wheezing  Cardiovascular ROS: No diaphoresis, No edema, No palpitations  Hematologic/Lymphatic ROS: No chills, No night sweats, No weight loss  Skin/Integumentary ROS: No edema, No evidence of rash, No itching    Exam:  /68   Pulse 79   Temp 36.6 °C (97.9 °F)   Resp 14   Ht 1.575 m (5' 2\")   Wt 71.7 kg (158 lb)   SpO2 96%   General: Well developed, well nourished. No distress.    Pulmonary: Unlabored respiratory effort.   Neurologic: Grossly nonfocal. No facial asymmetry noted.  Skin: Warm, dry, good turgor. No rashes in visible areas.   Psych: Normal mood. Alert and oriented to person, place and time.    Assessment/Plan:  Refilled escitalopram for 1 year.  Advised following up with " primary care if she feels that she needs a dose change.  1. GILL (generalized anxiety disorder)  escitalopram (LEXAPRO) 10 MG Tab

## 2021-09-07 ENCOUNTER — OFFICE VISIT (OUTPATIENT)
Dept: MEDICAL GROUP | Facility: PHYSICIAN GROUP | Age: 61
End: 2021-09-07
Payer: COMMERCIAL

## 2021-09-07 VITALS
RESPIRATION RATE: 16 BRPM | HEIGHT: 62 IN | SYSTOLIC BLOOD PRESSURE: 148 MMHG | HEART RATE: 82 BPM | OXYGEN SATURATION: 91 % | WEIGHT: 172.8 LBS | BODY MASS INDEX: 31.8 KG/M2 | DIASTOLIC BLOOD PRESSURE: 88 MMHG

## 2021-09-07 DIAGNOSIS — I10 ESSENTIAL HYPERTENSION: ICD-10-CM

## 2021-09-07 DIAGNOSIS — F41.1 GAD (GENERALIZED ANXIETY DISORDER): ICD-10-CM

## 2021-09-07 DIAGNOSIS — J44.89 COPD (CHRONIC OBSTRUCTIVE PULMONARY DISEASE) WITH CHRONIC BRONCHITIS (HCC): ICD-10-CM

## 2021-09-07 DIAGNOSIS — F33.0 MILD EPISODE OF RECURRENT MAJOR DEPRESSIVE DISORDER (HCC): ICD-10-CM

## 2021-09-07 PROCEDURE — 99214 OFFICE O/P EST MOD 30 MIN: CPT | Performed by: NURSE PRACTITIONER

## 2021-09-07 RX ORDER — ESCITALOPRAM OXALATE 20 MG/1
20 TABLET ORAL DAILY
Qty: 90 TABLET | Refills: 3 | Status: SHIPPED | OUTPATIENT
Start: 2021-09-07 | End: 2022-08-23

## 2021-09-07 ASSESSMENT — PATIENT HEALTH QUESTIONNAIRE - PHQ9
SUM OF ALL RESPONSES TO PHQ QUESTIONS 1-9: 14
CLINICAL INTERPRETATION OF PHQ2 SCORE: 3
5. POOR APPETITE OR OVEREATING: 2 - MORE THAN HALF THE DAYS

## 2021-09-07 ASSESSMENT — ANXIETY QUESTIONNAIRES
3. WORRYING TOO MUCH ABOUT DIFFERENT THINGS: NEARLY EVERY DAY
7. FEELING AFRAID AS IF SOMETHING AWFUL MIGHT HAPPEN: NEARLY EVERY DAY
1. FEELING NERVOUS, ANXIOUS, OR ON EDGE: NOT AT ALL
GAD7 TOTAL SCORE: 15
6. BECOMING EASILY ANNOYED OR IRRITABLE: MORE THAN HALF THE DAYS
IF YOU CHECKED OFF ANY PROBLEMS ON THIS QUESTIONNAIRE, HOW DIFFICULT HAVE THESE PROBLEMS MADE IT FOR YOU TO DO YOUR WORK, TAKE CARE OF THINGS AT HOME, OR GET ALONG WITH OTHER PEOPLE: SOMEWHAT DIFFICULT
2. NOT BEING ABLE TO STOP OR CONTROL WORRYING: MORE THAN HALF THE DAYS
5. BEING SO RESTLESS THAT IT IS HARD TO SIT STILL: MORE THAN HALF THE DAYS
4. TROUBLE RELAXING: NEARLY EVERY DAY

## 2021-09-07 ASSESSMENT — FIBROSIS 4 INDEX: FIB4 SCORE: 0.96

## 2021-09-07 NOTE — ASSESSMENT & PLAN NOTE
Chronic condition currently elevated 158/86 and 148/88.  Patient does report that she drank more beers this weekend than normal.  Her daily consumption of alcohol was 12 she is cut it down to 6. Discussed the importance of continue to slowly decrease alcohol intake with a goal of 2 or less per day per CDC guidelines.      Currently taking lisinopril 10 mg nightly as directed, no refills needed today.  Denies lightheadedness, vision changes, palpitations or leg swelling.    Patient have MA visit for blood pressure check next week with follow-up visit for hypertension in 30 days.

## 2021-09-07 NOTE — PATIENT INSTRUCTIONS
"Managing Your Hypertension  Hypertension is commonly called high blood pressure. This is when the force of your blood pressing against the walls of your arteries is too strong. Arteries are blood vessels that carry blood from your heart throughout your body. Hypertension forces the heart to work harder to pump blood, and may cause the arteries to become narrow or stiff. Having untreated or uncontrolled hypertension can cause heart attack, stroke, kidney disease, and other problems.  What are blood pressure readings?  A blood pressure reading consists of a higher number over a lower number. Ideally, your blood pressure should be below 120/80. The first (\"top\") number is called the systolic pressure. It is a measure of the pressure in your arteries as your heart beats. The second (\"bottom\") number is called the diastolic pressure. It is a measure of the pressure in your arteries as the heart relaxes.  What does my blood pressure reading mean?  Blood pressure is classified into four stages. Based on your blood pressure reading, your health care provider may use the following stages to determine what type of treatment you need, if any. Systolic pressure and diastolic pressure are measured in a unit called mm Hg.  Normal  · Systolic pressure: below 120.  · Diastolic pressure: below 80.  Elevated  · Systolic pressure: 120-129.  · Diastolic pressure: below 80.  Hypertension stage 1  · Systolic pressure: 130-139.  · Diastolic pressure: 80-89.  Hypertension stage 2  · Systolic pressure: 140 or above.  · Diastolic pressure: 90 or above.  What health risks are associated with hypertension?  Managing your hypertension is an important responsibility. Uncontrolled hypertension can lead to:  · A heart attack.  · A stroke.  · A weakened blood vessel (aneurysm).  · Heart failure.  · Kidney damage.  · Eye damage.  · Metabolic syndrome.  · Memory and concentration problems.  What changes can I make to manage my " hypertension?  Hypertension can be managed by making lifestyle changes and possibly by taking medicines. Your health care provider will help you make a plan to bring your blood pressure within a normal range.  Eating and drinking    · Eat a diet that is high in fiber and potassium, and low in salt (sodium), added sugar, and fat. An example eating plan is called the DASH (Dietary Approaches to Stop Hypertension) diet. To eat this way:  ? Eat plenty of fresh fruits and vegetables. Try to fill half of your plate at each meal with fruits and vegetables.  ? Eat whole grains, such as whole wheat pasta, brown rice, or whole grain bread. Fill about one quarter of your plate with whole grains.  ? Eat low-fat diary products.  ? Avoid fatty cuts of meat, processed or cured meats, and poultry with skin. Fill about one quarter of your plate with lean proteins such as fish, chicken without skin, beans, eggs, and tofu.  ? Avoid premade and processed foods. These tend to be higher in sodium, added sugar, and fat.  · Reduce your daily sodium intake. Most people with hypertension should eat less than 1,500 mg of sodium a day.  · Limit alcohol intake to no more than 1 drink a day for nonpregnant women and 2 drinks a day for men. One drink equals 12 oz of beer, 5 oz of wine, or 1½ oz of hard liquor.  Lifestyle  · Work with your health care provider to maintain a healthy body weight, or to lose weight. Ask what an ideal weight is for you.  · Get at least 30 minutes of exercise that causes your heart to beat faster (aerobic exercise) most days of the week. Activities may include walking, swimming, or biking.  · Include exercise to strengthen your muscles (resistance exercise), such as weight lifting, as part of your weekly exercise routine. Try to do these types of exercises for 30 minutes at least 3 days a week.  · Do not use any products that contain nicotine or tobacco, such as cigarettes and e-cigarettes. If you need help quitting,  ask your health care provider.  · Control any long-term (chronic) conditions you have, such as high cholesterol or diabetes.  Monitoring  · Monitor your blood pressure at home as told by your health care provider. Your personal target blood pressure may vary depending on your medical conditions, your age, and other factors.  · Have your blood pressure checked regularly, as often as told by your health care provider.  Working with your health care provider  · Review all the medicines you take with your health care provider because there may be side effects or interactions.  · Talk with your health care provider about your diet, exercise habits, and other lifestyle factors that may be contributing to hypertension.  · Visit your health care provider regularly. Your health care provider can help you create and adjust your plan for managing hypertension.  Will I need medicine to control my blood pressure?  Your health care provider may prescribe medicine if lifestyle changes are not enough to get your blood pressure under control, and if:  · Your systolic blood pressure is 130 or higher.  · Your diastolic blood pressure is 80 or higher.  Take medicines only as told by your health care provider. Follow the directions carefully. Blood pressure medicines must be taken as prescribed. The medicine does not work as well when you skip doses. Skipping doses also puts you at risk for problems.  Contact a health care provider if:  · You think you are having a reaction to medicines you have taken.  · You have repeated (recurrent) headaches.  · You feel dizzy.  · You have swelling in your ankles.  · You have trouble with your vision.  Get help right away if:  · You develop a severe headache or confusion.  · You have unusual weakness or numbness, or you feel faint.  · You have severe pain in your chest or abdomen.  · You vomit repeatedly.  · You have trouble breathing.  Summary  · Hypertension is when the force of blood pumping  through your arteries is too strong. If this condition is not controlled, it may put you at risk for serious complications.  · Your personal target blood pressure may vary depending on your medical conditions, your age, and other factors. For most people, a normal blood pressure is less than 120/80.  · Hypertension is managed by lifestyle changes, medicines, or both. Lifestyle changes include weight loss, eating a healthy, low-sodium diet, exercising more, and limiting alcohol.  This information is not intended to replace advice given to you by your health care provider. Make sure you discuss any questions you have with your health care provider.  Document Released: 09/11/2013 Document Revised: 04/10/2020 Document Reviewed: 11/15/2017  [employer] Patient Education © 2020 [employer]    Smoking Cessation, Tips for Success  If you are ready to quit smoking, congratulations! You have chosen to help yourself be healthier. Cigarettes bring nicotine, tar, carbon monoxide, and other irritants into your body. Your lungs, heart, and blood vessels will be able to work better without these poisons. There are many different ways to quit smoking. Nicotine gum, nicotine patches, a nicotine inhaler, or nicotine nasal spray can help with physical craving. Hypnosis, support groups, and medicines help break the habit of smoking.  WHAT THINGS CAN I DO TO MAKE QUITTING EASIER?   Here are some tips to help you quit for good:  · Pick a date when you will quit smoking completely. Tell all of your friends and family about your plan to quit on that date.  · Do not try to slowly cut down on the number of cigarettes you are smoking. Pick a quit date and quit smoking completely starting on that day.  · Throw away all cigarettes.    · Clean and remove all ashtrays from your home, work, and car.  · On a card, write down your reasons for quitting. Carry the card with you and read it when you get the urge to smoke.  · Cleanse your body of  "nicotine. Drink enough water and fluids to keep your urine clear or pale yellow. Do this after quitting to flush the nicotine from your body.  · Learn to predict your moods. Do not let a bad situation be your excuse to have a cigarette. Some situations in your life might tempt you into wanting a cigarette.  · Never have \"just one\" cigarette. It leads to wanting another and another. Remind yourself of your decision to quit.  · Change habits associated with smoking. If you smoked while driving or when feeling stressed, try other activities to replace smoking. Stand up when drinking your coffee. Brush your teeth after eating. Sit in a different chair when you read the paper. Avoid alcohol while trying to quit, and try to drink fewer caffeinated beverages. Alcohol and caffeine may urge you to smoke.  · Avoid foods and drinks that can trigger a desire to smoke, such as sugary or spicy foods and alcohol.  · Ask people who smoke not to smoke around you.  · Have something planned to do right after eating or having a cup of coffee. For example, plan to take a walk or exercise.  · Try a relaxation exercise to calm you down and decrease your stress. Remember, you may be tense and nervous for the first 2 weeks after you quit, but this will pass.  · Find new activities to keep your hands busy. Play with a pen, coin, or rubber band. Doodle or draw things on paper.  · Brush your teeth right after eating. This will help cut down on the craving for the taste of tobacco after meals. You can also try mouthwash.    · Use oral substitutes in place of cigarettes. Try using lemon drops, carrots, cinnamon sticks, or chewing gum. Keep them handy so they are available when you have the urge to smoke.  · When you have the urge to smoke, try deep breathing.  · Designate your home as a nonsmoking area.  · If you are a heavy smoker, ask your health care provider about a prescription for nicotine chewing gum. It can ease your withdrawal from " "nicotine.  · Reward yourself. Set aside the cigarette money you save and buy yourself something nice.  · Look for support from others. Join a support group or smoking cessation program. Ask someone at home or at work to help you with your plan to quit smoking.  · Always ask yourself, \"Do I need this cigarette or is this just a reflex?\" Tell yourself, \"Today, I choose not to smoke,\" or \"I do not want to smoke.\" You are reminding yourself of your decision to quit.  · Do not replace cigarette smoking with electronic cigarettes (commonly called e-cigarettes). The safety of e-cigarettes is unknown, and some may contain harmful chemicals.  · If you relapse, do not give up! Plan ahead and think about what you will do the next time you get the urge to smoke.  HOW WILL I FEEL WHEN I QUIT SMOKING?  You may have symptoms of withdrawal because your body is used to nicotine (the addictive substance in cigarettes). You may crave cigarettes, be irritable, feel very hungry, cough often, get headaches, or have difficulty concentrating. The withdrawal symptoms are only temporary. They are strongest when you first quit but will go away within 10-14 days. When withdrawal symptoms occur, stay in control. Think about your reasons for quitting. Remind yourself that these are signs that your body is healing and getting used to being without cigarettes. Remember that withdrawal symptoms are easier to treat than the major diseases that smoking can cause.   Even after the withdrawal is over, expect periodic urges to smoke. However, these cravings are generally short lived and will go away whether you smoke or not. Do not smoke!  WHAT RESOURCES ARE AVAILABLE TO HELP ME QUIT SMOKING?  Your health care provider can direct you to community resources or hospitals for support, which may include:  · Group support.  · Education.  · Hypnosis.  · Therapy.     This information is not intended to replace advice given to you by your health care provider. " Make sure you discuss any questions you have with your health care provider.     Document Released: 09/15/2005 Document Revised: 01/08/2016 Document Reviewed: 06/05/2014  [employer]vier Interactive Patient Education ©2016 [employer]

## 2021-09-07 NOTE — ASSESSMENT & PLAN NOTE
GILL 7 5/19/2021 9/7/2021   GILL-7 Total Score 20 15       Interpretation of GILL 7 Total Score   Score Severity:  0-4 No Anxiety   5-9 Mild Anxiety  10-14 Moderate Anxiety  15-21 Severe Anxiety    Patient reports that her anxiety and depression has improved since starting Lexapro however she continues to have depression and anxiety symptoms.  Discussed with patient the symptoms might be due to excess alcohol.  Patient denies any suicidal ideations.    New prescription of Lexapro 20 mg tablet daily was sent to pharmacy.

## 2021-09-07 NOTE — PROGRESS NOTES
Chief Complaint   Patient presents with   • Follow-Up     3m [facility] for COPD       Subjective:     HPI:   Kristel Sewell is a 60 y.o. female here to discuss the evaluation and management of:      Assessment and Plan:     Essential hypertension  Chronic condition currently elevated 158/86 and 148/88.  Patient does report that she drank more beers this weekend than normal.  Her daily consumption of alcohol was 12 she is cut it down to 6. Discussed the importance of continue to slowly decrease alcohol intake with a goal of 2 or less per day per CDC guidelines.      Currently taking lisinopril 10 mg nightly as directed, no refills needed today.  Denies lightheadedness, vision changes, palpitations or leg swelling.    Patient have [facility] visit for blood pressure check next week with follow-up visit for hypertension in 30 days.    COPD (chronic obstructive pulmonary disease) with chronic bronchitis (HCC)  Patient reports that she uses albuterol once to twice a week as a rescue inhaler.  She does continue to use her Advair and spiriva daily.  She reports she is tolerating his medications well and does see improvement in her breathing.  She continues to also use 2.5 L oxygen via nasal cannula at night while sleeping.  She is waking up with morning headaches.  Did discuss the possible diagnosis of sleep apnea.  Patient does have a pulmonology appointment scheduled for 10-.  Patient reports that she does not like the mask and does not want to be evaluated for sleep apnea at this time.    GILL (generalized anxiety disorder)  GILL 7 5/19/2021 9/7/2021   GILL-7 Total Score 20 15       Interpretation of GILL 7 Total Score   Score Severity:  0-4 No Anxiety   5-9 Mild Anxiety  10-14 Moderate Anxiety  15-21 Severe Anxiety    Patient reports that her anxiety and depression has improved since starting Lexapro however she continues to have depression and anxiety symptoms.  Discussed with patient the symptoms might be due to excess  alcohol.  Patient denies any suicidal ideations.    New prescription of Lexapro 20 mg tablet daily was sent to pharmacy.    Mild episode of recurrent major depressive disorder (HCC)  Depression Screen (PHQ-2/PHQ-9) 5/19/2021 9/7/2021   PHQ-2 Total Score 6 3   PHQ-9 Total Score 8 14       Interpretation of PHQ-9 Total Score   Score Severity   1-4 No Depression   5-9 Mild Depression   10-14 Moderate Depression   15-19 Moderately Severe Depression   20-27 Severe Depression    Chronic condition.  See additional notes in GILL.  Lexapro was increased to 20 mg daily.            ROS:  Gen: no fevers/chills, no changes in weight  Eyes: no changes in vision  ENT: no sore throat, no hearing loss, no bloody nose  Pulm: no increase in sob, no cough  CV: no chest pain, no palpitations  GI: no nausea/vomiting, no diarrhea  : no dysuria  MSk: no myalgias  Skin: no rash  Neuro: Positive per HPI for headache.  Psych: Positive per HPI    Allergies   Allergen Reactions   • Penicillins        Current medicines (including changes today)  Current Outpatient Medications   Medication Sig Dispense Refill   • escitalopram (LEXAPRO) 20 MG tablet Take 1 Tablet by mouth every day. 90 Tablet 3   • atorvastatin (LIPITOR) 20 MG Tab Take 1 tablet by mouth every evening. 90 tablet 1   • lisinopril (PRINIVIL) 10 MG Tab Take 1 tablet by mouth every day. 90 tablet 3   • fluticasone-salmeterol (ADVAIR HFA) 230-21 MCG/ACT inhaler Inhale 2 Puffs 2 times a day. 12 g 11   • tiotropium (SPIRIVA RESPIMAT) 2.5 mcg/Act Aero Soln Inhale 2 Inhalation every day. 4 g 11   • albuterol (PROAIR HFA) 108 (90 Base) MCG/ACT Aero Soln inhalation aerosol Inhale 2 Puffs every 6 hours as needed for Shortness of Breath. 18 g 6   • hydrOXYzine HCl (ATARAX) 25 MG Tab Take 1 tablet by mouth 3 times a day as needed for Anxiety. 30 tablet 0   • albuterol 108 (90 Base) MCG/ACT Aero Soln inhalation aerosol 2 PUFFS EVERY 4 HOURS ONLY IF NEEDED FOR COUGH, WHEEZING, OR SHORTNESS OF  "BREATH. 8.5 g 5     No current facility-administered medications for this visit.       Social History     Tobacco Use   • Smoking status: Current Every Day Smoker     Packs/day: 1.50     Years: 40.00     Pack years: 60.00     Types: Cigarettes   • Smokeless tobacco: Never Used   Vaping Use   • Vaping Use: Never used   Substance Use Topics   • Alcohol use: Yes     Alcohol/week: 18.0 oz     Types: 30 Cans of beer per week     Comment: 3 day 12-16 or bottles   • Drug use: Not Currently       Patient Active Problem List    Diagnosis Date Noted   • Mixed dyslipidemia 06/02/2021   • Obesity (BMI 30-39.9) 06/02/2021   • GILL (generalized anxiety disorder) 05/19/2021   • Mild episode of recurrent major depressive disorder (HCC) 05/19/2021   • COPD (chronic obstructive pulmonary disease) with chronic bronchitis (HCC) 05/19/2021   • Tobacco dependence due to cigarettes 05/19/2021   • Essential hypertension 05/19/2021       Family History   Problem Relation Age of Onset   • Kidney Disease Mother    • Heart Disease Mother    • Stroke Mother    • Diabetes Mother    • Diabetes Father    • Kidney Disease Father    • Arrythmia Father           Objective:     /88 (BP Location: Right arm, Patient Position: Sitting, BP Cuff Size: Adult)   Pulse 82   Resp 16   Ht 1.575 m (5' 2\")   Wt 78.4 kg (172 lb 12.8 oz)   SpO2 91%  Body mass index is 31.61 kg/m².     Physical Exam:  Constitutional: Well-developed and well-nourished female in NAD. Not diaphoretic. No distress.   Skin: warm, dry, intact, no evidence of rash or concerning lesions  Head: Atraumatic without lesions.  Cardiovascular: Regular rate and rhythm without murmur. Radial pulses are intact and equal bilaterally.  Pulmonary: Diminished throughout to ausculation. Normal effort. No rales, ronchi, or wheezing.   Extremities: No cyanosis, clubbing, erythema, nor edema.   Neurological: Alert and oriented x 3. No cranial nerve deficit. 5/5 myotomes. Sensation intact. "   Psychiatric:  Behavior, mood, and affect are appropriate.       The following treatment plan was discussed:    1. Essential hypertension    2. GILL (generalized anxiety disorder)  - escitalopram (LEXAPRO) 20 MG tablet; Take 1 Tablet by mouth every day.  Dispense: 90 Tablet; Refill: 3    3. Mild episode of recurrent major depressive disorder (HCC)  - escitalopram (LEXAPRO) 20 MG tablet; Take 1 Tablet by mouth every day.  Dispense: 90 Tablet; Refill: 3    4. COPD (chronic obstructive pulmonary disease) with chronic bronchitis (HCC)       Any change or worsening of signs or symptoms, patient encouraged to follow-up or report to emergency room for further evaluation. Patient verbalizes understanding and agrees.    Follow-Up: Return in about 4 weeks (around 10/5/2021) for HTN.      PLEASE NOTE: This dictation was created using voice recognition software. I have made every reasonable attempt to correct obvious errors, but I expect that there are errors of grammar and possibly content that I did not discover before finalizing the note.

## 2021-09-07 NOTE — ASSESSMENT & PLAN NOTE
Depression Screen (PHQ-2/PHQ-9) 5/19/2021 9/7/2021   PHQ-2 Total Score 6 3   PHQ-9 Total Score 8 14       Interpretation of PHQ-9 Total Score   Score Severity   1-4 No Depression   5-9 Mild Depression   10-14 Moderate Depression   15-19 Moderately Severe Depression   20-27 Severe Depression    Chronic condition.  See additional notes in GILL.  Lexapro was increased to 20 mg daily.

## 2021-09-07 NOTE — ASSESSMENT & PLAN NOTE
Patient reports that she uses albuterol once to twice a week as a rescue inhaler.  She does continue to use her Advair and spiriva daily.  She reports she is tolerating his medications well and does see improvement in her breathing.  She continues to also use 2.5 L oxygen via nasal cannula at night while sleeping.  She is waking up with morning headaches.  Did discuss the possible diagnosis of sleep apnea.  Patient does have a pulmonology appointment scheduled for 10-.  Patient reports that she does not like the mask and does not want to be evaluated for sleep apnea at this time.

## 2021-10-12 ENCOUNTER — OFFICE VISIT (OUTPATIENT)
Dept: MEDICAL GROUP | Facility: PHYSICIAN GROUP | Age: 61
End: 2021-10-12
Payer: COMMERCIAL

## 2021-10-12 ENCOUNTER — APPOINTMENT (OUTPATIENT)
Dept: RADIOLOGY | Facility: MEDICAL CENTER | Age: 61
End: 2021-10-12
Attending: EMERGENCY MEDICINE
Payer: COMMERCIAL

## 2021-10-12 ENCOUNTER — HOSPITAL ENCOUNTER (EMERGENCY)
Facility: MEDICAL CENTER | Age: 61
End: 2021-10-12
Attending: EMERGENCY MEDICINE
Payer: COMMERCIAL

## 2021-10-12 VITALS
TEMPERATURE: 98 F | WEIGHT: 188.05 LBS | HEIGHT: 63 IN | RESPIRATION RATE: 16 BRPM | SYSTOLIC BLOOD PRESSURE: 176 MMHG | OXYGEN SATURATION: 92 % | DIASTOLIC BLOOD PRESSURE: 94 MMHG | HEART RATE: 84 BPM | BODY MASS INDEX: 33.32 KG/M2

## 2021-10-12 VITALS
RESPIRATION RATE: 16 BRPM | WEIGHT: 180 LBS | HEIGHT: 63 IN | BODY MASS INDEX: 31.89 KG/M2 | TEMPERATURE: 98 F | OXYGEN SATURATION: 92 % | DIASTOLIC BLOOD PRESSURE: 78 MMHG | HEART RATE: 100 BPM | SYSTOLIC BLOOD PRESSURE: 146 MMHG

## 2021-10-12 DIAGNOSIS — K82.8 CALCIFICATION OF GALLBLADDER: ICD-10-CM

## 2021-10-12 DIAGNOSIS — I10 ESSENTIAL HYPERTENSION: ICD-10-CM

## 2021-10-12 DIAGNOSIS — Z23 NEED FOR VACCINATION: ICD-10-CM

## 2021-10-12 DIAGNOSIS — Z13.21 ENCOUNTER FOR VITAMIN DEFICIENCY SCREENING: ICD-10-CM

## 2021-10-12 DIAGNOSIS — Z12.31 ENCOUNTER FOR SCREENING MAMMOGRAM FOR BREAST CANCER: ICD-10-CM

## 2021-10-12 DIAGNOSIS — E78.2 MIXED DYSLIPIDEMIA: ICD-10-CM

## 2021-10-12 DIAGNOSIS — F33.0 MILD EPISODE OF RECURRENT MAJOR DEPRESSIVE DISORDER (HCC): ICD-10-CM

## 2021-10-12 DIAGNOSIS — R91.1 PULMONARY NODULE: ICD-10-CM

## 2021-10-12 DIAGNOSIS — I73.9 PERIPHERAL ARTERIAL DISEASE (HCC): ICD-10-CM

## 2021-10-12 LAB
ANION GAP SERPL CALC-SCNC: 9 MMOL/L (ref 7–16)
BASOPHILS # BLD AUTO: 1 % (ref 0–1.8)
BASOPHILS # BLD: 0.08 K/UL (ref 0–0.12)
BUN SERPL-MCNC: 9 MG/DL (ref 8–22)
CALCIUM SERPL-MCNC: 9.1 MG/DL (ref 8.5–10.5)
CHLORIDE SERPL-SCNC: 102 MMOL/L (ref 96–112)
CO2 SERPL-SCNC: 26 MMOL/L (ref 20–33)
CREAT SERPL-MCNC: 0.65 MG/DL (ref 0.5–1.4)
EKG IMPRESSION: NORMAL
EOSINOPHIL # BLD AUTO: 0.18 K/UL (ref 0–0.51)
EOSINOPHIL NFR BLD: 2.3 % (ref 0–6.9)
ERYTHROCYTE [DISTWIDTH] IN BLOOD BY AUTOMATED COUNT: 50.4 FL (ref 35.9–50)
GLUCOSE SERPL-MCNC: 82 MG/DL (ref 65–99)
HCT VFR BLD AUTO: 45.8 % (ref 37–47)
HGB BLD-MCNC: 15.4 G/DL (ref 12–16)
IMM GRANULOCYTES # BLD AUTO: 0.07 K/UL (ref 0–0.11)
IMM GRANULOCYTES NFR BLD AUTO: 0.9 % (ref 0–0.9)
LYMPHOCYTES # BLD AUTO: 1.7 K/UL (ref 1–4.8)
LYMPHOCYTES NFR BLD: 21.3 % (ref 22–41)
MCH RBC QN AUTO: 32.9 PG (ref 27–33)
MCHC RBC AUTO-ENTMCNC: 33.6 G/DL (ref 33.6–35)
MCV RBC AUTO: 97.9 FL (ref 81.4–97.8)
MONOCYTES # BLD AUTO: 0.58 K/UL (ref 0–0.85)
MONOCYTES NFR BLD AUTO: 7.3 % (ref 0–13.4)
NEUTROPHILS # BLD AUTO: 5.39 K/UL (ref 2–7.15)
NEUTROPHILS NFR BLD: 67.2 % (ref 44–72)
NRBC # BLD AUTO: 0 K/UL
NRBC BLD-RTO: 0 /100 WBC
PLATELET # BLD AUTO: 219 K/UL (ref 164–446)
PMV BLD AUTO: 11.1 FL (ref 9–12.9)
POTASSIUM SERPL-SCNC: 5 MMOL/L (ref 3.6–5.5)
RBC # BLD AUTO: 4.68 M/UL (ref 4.2–5.4)
SODIUM SERPL-SCNC: 137 MMOL/L (ref 135–145)
WBC # BLD AUTO: 8 K/UL (ref 4.8–10.8)

## 2021-10-12 PROCEDURE — 80048 BASIC METABOLIC PNL TOTAL CA: CPT

## 2021-10-12 PROCEDURE — 93005 ELECTROCARDIOGRAM TRACING: CPT

## 2021-10-12 PROCEDURE — 74175 CTA ABDOMEN W/CONTRAST: CPT

## 2021-10-12 PROCEDURE — 99283 EMERGENCY DEPT VISIT LOW MDM: CPT

## 2021-10-12 PROCEDURE — 99215 OFFICE O/P EST HI 40 MIN: CPT | Performed by: NURSE PRACTITIONER

## 2021-10-12 PROCEDURE — 85025 COMPLETE CBC W/AUTO DIFF WBC: CPT

## 2021-10-12 PROCEDURE — 700117 HCHG RX CONTRAST REV CODE 255: Performed by: EMERGENCY MEDICINE

## 2021-10-12 RX ORDER — AMLODIPINE BESYLATE 5 MG/1
5 TABLET ORAL DAILY
Qty: 90 TABLET | Refills: 3 | Status: SHIPPED | OUTPATIENT
Start: 2021-10-12 | End: 2023-07-31

## 2021-10-12 RX ADMIN — IOHEXOL 80 ML: 350 INJECTION, SOLUTION INTRAVENOUS at 13:57

## 2021-10-12 ASSESSMENT — FIBROSIS 4 INDEX
FIB4 SCORE: 0.96
FIB4 SCORE: 0.96

## 2021-10-12 ASSESSMENT — ANXIETY QUESTIONNAIRES
7. FEELING AFRAID AS IF SOMETHING AWFUL MIGHT HAPPEN: NOT AT ALL
4. TROUBLE RELAXING: SEVERAL DAYS
3. WORRYING TOO MUCH ABOUT DIFFERENT THINGS: SEVERAL DAYS
1. FEELING NERVOUS, ANXIOUS, OR ON EDGE: NOT AT ALL
GAD7 TOTAL SCORE: 4
2. NOT BEING ABLE TO STOP OR CONTROL WORRYING: SEVERAL DAYS
5. BEING SO RESTLESS THAT IT IS HARD TO SIT STILL: NOT AT ALL
6. BECOMING EASILY ANNOYED OR IRRITABLE: SEVERAL DAYS

## 2021-10-12 ASSESSMENT — PATIENT HEALTH QUESTIONNAIRE - PHQ9
CLINICAL INTERPRETATION OF PHQ2 SCORE: 1
5. POOR APPETITE OR OVEREATING: 2 - MORE THAN HALF THE DAYS
SUM OF ALL RESPONSES TO PHQ QUESTIONS 1-9: 10

## 2021-10-12 NOTE — ASSESSMENT & PLAN NOTE
This is a chronic condition that is not well controlled.  Patient's blood pressure in clinic today was 146/88.  Patient reports that she is taking her taking her medications appropriately.  She denies any chest pain, headache, or syncopal episodes.  She does report that she has chronic shortness of breath.  On reevaluation of blood pressures of bilateral arms indicated extremely elevated blood pressures in right arm and decreased blood pressures in left arm.   Right arm 162/70, 162/64,178/88  Left arm 100/80, 88/60, 88/78  Associated risk factors include chronic uncontrolled hypertension, habitual smoker, obesity, and elevated cholesterol levels.   In-depth discussion with patient and  about risk of possible aneurysm and that they need to go directly to the emergency department for further evaluation and work-up.  Patient declined ambulance and insisted going POV.  Patient was instructed to be n.p.o. and go directly to the emergency department at [facility].

## 2021-10-12 NOTE — ED NOTES
Patient to room from [facility] in wheelchair. Changed into gown, connected to monitor. Agree with triage note. Charted for ERP. Dr. Campbell at bedside. Call light within reach. IV access placed. Blood drawn and sent to lab.

## 2021-10-12 NOTE — PROGRESS NOTES
Chief Complaint   Patient presents with   • Follow-Up       Subjective:     HPI:   Kristel Sewell is a 60 y.o. female here to discuss the evaluation and management of:      Essential hypertension  This is a chronic condition that is not well controlled.  Patient's blood pressure in clinic today was 146/88.  Patient reports that she is taking her taking her medications appropriately.  She denies any chest pain, headache, or syncopal episodes.  She does report that she has chronic shortness of breath.  On reevaluation of blood pressures of bilateral arms indicated extremely elevated blood pressures in right arm and decreased blood pressures in left arm.   Right arm 162/70, 162/64,178/88  Left arm 100/80, 88/60, 88/78  Associated risk factors include chronic uncontrolled hypertension, habitual smoker, obesity, and elevated cholesterol levels.   In-depth discussion with patient and  about risk of possible aneurysm and that they need to go directly to the emergency department for further evaluation and work-up.  Patient declined ambulance and insisted going POV.  Patient was instructed to be n.p.o. and go directly to the emergency department at [facility].      Mild episode of recurrent major depressive disorder (HCC)  This is a chronic condition.  Suicidal ideation/homicidal ideation: none  Therapy/counseling: None  Medications: Lixapro 20 mg daily and hydroxyzine PRN  Improving/worsening: Stable    Depression Screen (PHQ-2/PHQ-9) 5/19/2021 9/7/2021 10/12/2021   PHQ-2 Total Score 6 3 1   PHQ-9 Total Score 8 14 10       Interpretation of PHQ-9 Total Score   Score Severity   1-4 No Depression   5-9 Mild Depression   10-14 Moderate Depression   15-19 Moderately Severe Depression   20-27 Severe Depression                ROS:  Gen: no fevers/chills, no changes in weight  Eyes: no changes in vision  ENT: no sore throat, no hearing loss, no bloody nose  Pulm: Chronic shortness of breath   CV: no chest pain, no  palpitations  GI: no nausea/vomiting, no diarrhea  Neuro: no headaches, no numbness/tingling  Heme/Lymph: no easy bruising    Allergies   Allergen Reactions   • Penicillins        Current medicines (including changes today)  Current Outpatient Medications   Medication Sig Dispense Refill   • amLODIPine (NORVASC) 5 MG Tab Take 1 Tablet by mouth every day. 90 Tablet 3   • escitalopram (LEXAPRO) 20 MG tablet Take 1 Tablet by mouth every day. 90 Tablet 3   • atorvastatin (LIPITOR) 20 MG Tab Take 1 tablet by mouth every evening. 90 tablet 1   • lisinopril (PRINIVIL) 10 MG Tab Take 1 tablet by mouth every day. 90 tablet 3   • fluticasone-salmeterol (ADVAIR HFA) 230-21 MCG/ACT inhaler Inhale 2 Puffs 2 times a day. 12 g 11   • tiotropium (SPIRIVA RESPIMAT) 2.5 mcg/Act Aero Soln Inhale 2 Inhalation every day. 4 g 11   • albuterol (PROAIR HFA) 108 (90 Base) MCG/ACT Aero Soln inhalation aerosol Inhale 2 Puffs every 6 hours as needed for Shortness of Breath. 18 g 6   • hydrOXYzine HCl (ATARAX) 25 MG Tab Take 1 tablet by mouth 3 times a day as needed for Anxiety. 30 tablet 0   • albuterol 108 (90 Base) MCG/ACT Aero Soln inhalation aerosol 2 PUFFS EVERY 4 HOURS ONLY IF NEEDED FOR COUGH, WHEEZING, OR SHORTNESS OF BREATH. 8.5 g 5     No current facility-administered medications for this visit.       Social History     Tobacco Use   • Smoking status: Current Every Day Smoker     Packs/day: 1.50     Years: 40.00     Pack years: 60.00     Types: Cigarettes   • Smokeless tobacco: Never Used   Vaping Use   • Vaping Use: Never used   Substance Use Topics   • Alcohol use: Yes     Alcohol/week: 18.0 oz     Types: 30 Cans of beer per week     Comment: 3 day 12-16 or bottles   • Drug use: Not Currently       Patient Active Problem List    Diagnosis Date Noted   • Mixed dyslipidemia 06/02/2021   • Obesity (BMI 30-39.9) 06/02/2021   • GILL (generalized anxiety disorder) 05/19/2021   • Mild episode of recurrent major depressive disorder (HCC)  "05/19/2021   • COPD (chronic obstructive pulmonary disease) with chronic bronchitis (HCC) 05/19/2021   • Tobacco dependence due to cigarettes 05/19/2021   • Essential hypertension 05/19/2021       Family History   Problem Relation Age of Onset   • Kidney Disease Mother    • Heart Disease Mother    • Stroke Mother    • Diabetes Mother    • Diabetes Father    • Kidney Disease Father    • Arrythmia Father           Objective:       /78   Pulse 100   Temp 36.7 °C (98 °F) (Temporal)   Resp 16   Ht 1.588 m (5' 2.5\")   Wt 81.6 kg (180 lb)   SpO2 92%  Body mass index is 32.4 kg/m².    Physical Exam:  Constitutional: Well-developed and well-nourished female in NAD. Not diaphoretic. No distress.   Skin: warm, dry, intact, no evidence of rash or concerning lesions  Head: Atraumatic without lesions.  Eyes: Conjunctivae and extraocular motions are normal. Pupils are equal, round, and reactive to light. No scleral icterus.   Ears:  External ears unremarkable.   Cardiovascular: Tacky rate and rhythm without murmur. Radial pulses are intact and equal bilaterally. Right arm 162/70, 162/64,178/88  Left arm 100/80, 88/60, 88/78  Pulmonary: Diminished to ausculation. Normal effort. No rales, ronchi, or wheezing..   Neurological: Alert and oriented x 3. No cranial nerve deficit. 5/5 myotomes. Sensation intact.   Psychiatric:  Behavior, mood, and affect are appropriate.    Assessment and Plan:     The following treatment plan was discussed:    1. Essential hypertension  During her further evaluation of blood pressures it was noted that her right and left arm had extremely varying pressures.  Patient may be at risk for AAA.  She was agreeable to go directly to the emergency department.  She did decline ambulance transfer.  Transfer line was called and I spoke with Yas for report.  Patient was also instructed to stay n.p.o.  - amLODIPine (NORVASC) 5 MG Tab; Take 1 Tablet by mouth every day.  Dispense: 90 Tablet; Refill: " 3    2. Mixed dyslipidemia  Well controlled.  Labs as indicated.  Continue statin medication and lifestyle modifications.  Continue to monitor.  - Comp Metabolic Panel; Future  - Lipid Profile; Future      3. Mild episode of recurrent major depressive disorder (HCC)  Patient is feeling well on current medications.  Will continue.  Denies any suicidal or homicidal ideation.  Emphasized importance of healthy diet and exercise.  Discussed that should the patient have any symptoms they should call suicide prevention hotline or report to the emergency room immediately.  If such symptoms are observed, you or family need to contact us immediately, and consider calling the [facility] Suicide Prevention Lifeline (1393.798.1483) or 911, or transporting patient to the emergency department for immediate evaluation.       4. Encounter for screening mammogram for breast cancer  - MA-SCREENING MAMMO BILAT W/TOMOSYNTHESIS W/CAD; Future    5. Encounter for vitamin deficiency screening  - VITAMIN D,25 HYDROXY; Future  - VIT B12,  FOLIC ACID    6. Need for vaccination  Did not receive flu vaccine today as she was instructed go directly to the emergency department.    Any change or worsening of signs or symptoms, patient encouraged to follow-up or report to emergency room for further evaluation. Patient verbalizes understanding and agrees.    Follow-Up: Return in about 4 weeks (around 11/9/2021) for HTN.      PLEASE NOTE: This dictation was created using voice recognition software. I have made every reasonable attempt to correct obvious errors, but I expect that there are errors of grammar and possibly content that I did not discover before finalizing the note.

## 2021-10-12 NOTE — ED TRIAGE NOTES
"  Chief Complaint   Patient presents with   • Hypertension   • Head Pain   Pt seen at [facility] earlier today, instructed to go to the ED for further evaluation.   Per family \" the blood pressure in the right was high and the other was real low. \"  No chest pain.  No back pain.  No difficulty breathing.  No abdominal pain.  No N/V.  Pt received COVID vaccine.  Pt has history of COPD - on 2 liters home oxygen.    "

## 2021-10-12 NOTE — PATIENT INSTRUCTIONS
"Gastroenterology      [facility]   [address] [address]  Phone: [phone]      Managing Your Hypertension  Hypertension is commonly called high blood pressure. This is when the force of your blood pressing against the walls of your arteries is too strong. Arteries are blood vessels that carry blood from your heart throughout your body. Hypertension forces the heart to work harder to pump blood, and may cause the arteries to become narrow or stiff. Having untreated or uncontrolled hypertension can cause heart attack, stroke, kidney disease, and other problems.  What are blood pressure readings?  A blood pressure reading consists of a higher number over a lower number. Ideally, your blood pressure should be below 120/80. The first (\"top\") number is called the systolic pressure. It is a measure of the pressure in your arteries as your heart beats. The second (\"bottom\") number is called the diastolic pressure. It is a measure of the pressure in your arteries as the heart relaxes.  What does my blood pressure reading mean?  Blood pressure is classified into four stages. Based on your blood pressure reading, your health care provider may use the following stages to determine what type of treatment you need, if any. Systolic pressure and diastolic pressure are measured in a unit called mm Hg.  Normal  · Systolic pressure: below 120.  · Diastolic pressure: below 80.  Elevated  · Systolic pressure: 120-129.  · Diastolic pressure: below 80.  Hypertension stage 1  · Systolic pressure: 130-139.  · Diastolic pressure: 80-89.  Hypertension stage 2  · Systolic pressure: 140 or above.  · Diastolic pressure: 90 or above.  What health risks are associated with hypertension?  Managing your hypertension is an important responsibility. Uncontrolled hypertension can lead to:  · A heart attack.  · A stroke.  · A weakened blood vessel (aneurysm).  · Heart failure.  · Kidney damage.  · Eye damage.  · Metabolic " syndrome.  · Memory and concentration problems.  What changes can I make to manage my hypertension?  Hypertension can be managed by making lifestyle changes and possibly by taking medicines. Your health care provider will help you make a plan to bring your blood pressure within a normal range.  Eating and drinking    · Eat a diet that is high in fiber and potassium, and low in salt (sodium), added sugar, and fat. An example eating plan is called the DASH (Dietary Approaches to Stop Hypertension) diet. To eat this way:  ? Eat plenty of fresh fruits and vegetables. Try to fill half of your plate at each meal with fruits and vegetables.  ? Eat whole grains, such as whole wheat pasta, brown rice, or whole grain bread. Fill about one quarter of your plate with whole grains.  ? Eat low-fat diary products.  ? Avoid fatty cuts of meat, processed or cured meats, and poultry with skin. Fill about one quarter of your plate with lean proteins such as fish, chicken without skin, beans, eggs, and tofu.  ? Avoid premade and processed foods. These tend to be higher in sodium, added sugar, and fat.  · Reduce your daily sodium intake. Most people with hypertension should eat less than 1,500 mg of sodium a day.  · Limit alcohol intake to no more than 1 drink a day for nonpregnant women and 2 drinks a day for men. One drink equals 12 oz of beer, 5 oz of wine, or 1½ oz of hard liquor.  Lifestyle  · Work with your health care provider to maintain a healthy body weight, or to lose weight. Ask what an ideal weight is for you.  · Get at least 30 minutes of exercise that causes your heart to beat faster (aerobic exercise) most days of the week. Activities may include walking, swimming, or biking.  · Include exercise to strengthen your muscles (resistance exercise), such as weight lifting, as part of your weekly exercise routine. Try to do these types of exercises for 30 minutes at least 3 days a week.  · Do not use any products that contain  nicotine or tobacco, such as cigarettes and e-cigarettes. If you need help quitting, ask your health care provider.  · Control any long-term (chronic) conditions you have, such as high cholesterol or diabetes.  Monitoring  · Monitor your blood pressure at home as told by your health care provider. Your personal target blood pressure may vary depending on your medical conditions, your age, and other factors.  · Have your blood pressure checked regularly, as often as told by your health care provider.  Working with your health care provider  · Review all the medicines you take with your health care provider because there may be side effects or interactions.  · Talk with your health care provider about your diet, exercise habits, and other lifestyle factors that may be contributing to hypertension.  · Visit your health care provider regularly. Your health care provider can help you create and adjust your plan for managing hypertension.  Will I need medicine to control my blood pressure?  Your health care provider may prescribe medicine if lifestyle changes are not enough to get your blood pressure under control, and if:  · Your systolic blood pressure is 130 or higher.  · Your diastolic blood pressure is 80 or higher.  Take medicines only as told by your health care provider. Follow the directions carefully. Blood pressure medicines must be taken as prescribed. The medicine does not work as well when you skip doses. Skipping doses also puts you at risk for problems.  Contact a health care provider if:  · You think you are having a reaction to medicines you have taken.  · You have repeated (recurrent) headaches.  · You feel dizzy.  · You have swelling in your ankles.  · You have trouble with your vision.  Get help right away if:  · You develop a severe headache or confusion.  · You have unusual weakness or numbness, or you feel faint.  · You have severe pain in your chest or abdomen.  · You vomit repeatedly.  · You have  trouble breathing.  Summary  · Hypertension is when the force of blood pumping through your arteries is too strong. If this condition is not controlled, it may put you at risk for serious complications.  · Your personal target blood pressure may vary depending on your medical conditions, your age, and other factors. For most people, a normal blood pressure is less than 120/80.  · Hypertension is managed by lifestyle changes, medicines, or both. Lifestyle changes include weight loss, eating a healthy, low-sodium diet, exercising more, and limiting alcohol.  This information is not intended to replace advice given to you by your health care provider. Make sure you discuss any questions you have with your health care provider.  Document Released: 09/11/2013 Document Revised: 04/10/2020 Document Reviewed: 11/15/2017  Elsevier Patient Education © 2020 [employer]

## 2021-10-12 NOTE — ED PROVIDER NOTES
ED Provider Note    CHIEF COMPLAINT  Chief Complaint   Patient presents with   • Hypertension   • Head Pain       HPI  Kristel Sewell is a 60 y.o. female who is sent from PCPs office with asymmetric blood pressures.  The patient went there for routine visit, has a history of hypertension.  While having vital signs at the office she was noted to have significantly and persistent asymmetric blood pressures.  Sent to rule out dissection.  No chest or back or abdominal pain.  No focal weakness numbness or tingling.  Has had a headache which she states really is not that atypical for her.  She offers no other acute complaints at this time, other medical history is significant for COPD, anxiety, GERD and depression.  Documentation reviewed from her PCP visit this morning: Her documented blood pressures as follows:  Right arm 162/70, 162/64,178/88  Left arm 100/80, 88/60, 88/78      REVIEW OF SYSTEMS  Negative for fever, rash, chest pain, dyspnea, abdominal pain, nausea, vomiting, diarrhea, headache, focal weakness, focal numbness, focal tingling, back pain. All other systems are negative.     PAST MEDICAL HISTORY  Past Medical History:   Diagnosis Date   • COPD (chronic obstructive pulmonary disease) (HCC)    • Essential hypertension 5/19/2021   • GILL (generalized anxiety disorder) 5/19/2021   • GERD (gastroesophageal reflux disease)    • Mild episode of recurrent major depressive disorder (HCC) 5/19/2021   • Mixed dyslipidemia 6/2/2021       FAMILY HISTORY  Family History   Problem Relation Age of Onset   • Kidney Disease Mother    • Heart Disease Mother    • Stroke Mother    • Diabetes Mother    • Diabetes Father    • Kidney Disease Father    • Arrythmia Father        SOCIAL HISTORY  Social History     Tobacco Use   • Smoking status: Current Every Day Smoker     Packs/day: 1.00     Years: 40.00     Pack years: 40.00     Types: Cigarettes   • Smokeless tobacco: Never Used   Vaping Use   • Vaping Use: Never used  "  Substance Use Topics   • Alcohol use: Yes     Alcohol/week: 2.4 oz     Types: 4 Cans of beer per week     Comment: 4 beers per day   • Drug use: Not Currently       SURGICAL HISTORY  History reviewed. No pertinent surgical history.    CURRENT MEDICATIONS  I personally reviewed the medication list in the charting documentation.     ALLERGIES  Allergies   Allergen Reactions   • Penicillins        MEDICAL RECORD  I have reviewed patient's medical record and pertinent results are listed above.      PHYSICAL EXAM  VITAL SIGNS: /88   Pulse 76   Temp 36.6 °C (97.8 °F) (Temporal)   Resp 18   Ht 1.6 m (5' 3\")   Wt 85.3 kg (188 lb 0.8 oz)   SpO2 93%   BMI 33.31 kg/m²    Constitutional: Well appearing patient in no acute distress.  Awake and alert, not toxic nor ill in appearance.  HENT: Normocephalic, no obvious evidence of acute trauma.  Eyes: No scleral icterus. Normal conjunctiva   Neck: Comfortable movement without any obvious restriction in the range of motion.  Cardiovascular: Upon ascultation I appreciate a regular heart rhythm and a normal rate.  Symmetric radial pulses  Thorax & Lungs: Normal nonlabored respirations.  Upon application of the stethoscope for auscultation I find there to be no associated chest wall tenderness.  I appreciate no wheezing, rhonchi or rales. There is normal air movement.    Abdomen: The abdomen is not visibly distended. Upon palpation, I find it to be without tenderness.  No mass appreciated.  Skin: The exposed portions of skin reveal no obvious rash or other abnormalities.  Extremities/Musculoskeletal: No obvious sign of acute trauma. No asymmetric calf tenderness or edema.   Neurologic: Alert & oriented. No focal deficits observed.   Psychiatric: Normal affect appropriate for the clinical situation.    DIAGNOSTIC STUDIES / PROCEDURES    LABS/EKGs  Results for orders placed or performed during the hospital encounter of 10/12/21   CBC WITH DIFFERENTIAL   Result Value Ref " Range    WBC 8.0 4.8 - 10.8 K/uL    RBC 4.68 4.20 - 5.40 M/uL    Hemoglobin 15.4 12.0 - 16.0 g/dL    Hematocrit 45.8 37.0 - 47.0 %    MCV 97.9 (H) 81.4 - 97.8 fL    MCH 32.9 27.0 - 33.0 pg    MCHC 33.6 33.6 - 35.0 g/dL    RDW 50.4 (H) 35.9 - 50.0 fL    Platelet Count 219 164 - 446 K/uL    MPV 11.1 9.0 - 12.9 fL    Neutrophils-Polys 67.20 44.00 - 72.00 %    Lymphocytes 21.30 (L) 22.00 - 41.00 %    Monocytes 7.30 0.00 - 13.40 %    Eosinophils 2.30 0.00 - 6.90 %    Basophils 1.00 0.00 - 1.80 %    Immature Granulocytes 0.90 0.00 - 0.90 %    Nucleated RBC 0.00 /100 WBC    Neutrophils (Absolute) 5.39 2.00 - 7.15 K/uL    Lymphs (Absolute) 1.70 1.00 - 4.80 K/uL    Monos (Absolute) 0.58 0.00 - 0.85 K/uL    Eos (Absolute) 0.18 0.00 - 0.51 K/uL    Baso (Absolute) 0.08 0.00 - 0.12 K/uL    Immature Granulocytes (abs) 0.07 0.00 - 0.11 K/uL    NRBC (Absolute) 0.00 K/uL   BASIC METABOLIC PANEL   Result Value Ref Range    Sodium 137 135 - 145 mmol/L    Potassium 5.0 3.6 - 5.5 mmol/L    Chloride 102 96 - 112 mmol/L    Co2 26 20 - 33 mmol/L    Glucose 82 65 - 99 mg/dL    Bun 9 8 - 22 mg/dL    Creatinine 0.65 0.50 - 1.40 mg/dL    Calcium 9.1 8.5 - 10.5 mg/dL    Anion Gap 9.0 7.0 - 16.0   ESTIMATED GFR   Result Value Ref Range    GFR If African American >60 >60 mL/min/1.73 m 2    GFR If Non African American >60 >60 mL/min/1.73 m 2   EKG   Result Value Ref Range    Report       [facility] Emergency Dept.    Test Date:  2021-10-12  Pt Name:    FRANC ZALDIVAR              Department: ER  MRN:        [id number]                      Room:       [facility]  Gender:     Female                       Technician: [id number]  :        1960                   Requested By:PHILL MCGEE  Order #:    [id number]                    Reading MD: PHILL MCGEE MD    Measurements  Intervals                                Axis  Rate:       73                           P:          76  RI:         140                          QRS:         93  QRSD:       119                          T:          84  QT:         442  QTc:        488    Interpretive Statements  12 Lead EKG interpreted by me to show: -- Rate 73 -- Rhythm: Normal sinus  rhythm  -- Axis: Normal -- MI and QRS Intervals: Normal -- T waves: No acute changes  --  ST segments: No acute changes -- Ectopy: None. My impression of this EKG:  Does  not indicate acute ischemia at this time.  Electronically Sig ivelisse On 10- 13:24:11 PDT by PHILL MCGEE MD          RADIOLOGY  CT-CTA COMPLETE THORACOABDOMINAL AORTA   Final Result      1.  No aortic aneurysm or dissection.   2.  Moderately advanced atherosclerosis. Severe proximal left subclavian artery stenosis.   3.  Emphysematous changes.   4.  New small irregular nodular opacities within the posterior right upper lobe which are indeterminate. These could be infectious/post inflammatory however cannot exclude malignancy. Follow-up noncontrast chest CT in 3-6 months is recommended.   5.  Moderate hiatal hernia.   6.  Indeterminate 5.2 cm right hepatic mass, probably a benign hemangioma.   7.  Calcification in the gallbladder fundus region which could be associated with focal wall thickening or represent dependent tiny stones. Right upper quadrant ultrasound is suggested.                     COURSE & MEDICAL DECISION MAKING  I have reviewed any medical record information, laboratory studies and radiographic results as noted above.    Encounter Summary: This is a very pleasant 60 y.o. female who unfortunately required evaluation in the emergency department today sent by PCP to rule out aortic dissection in the setting of asymmetric upper extremity blood pressures. She has not had chest or abdominal or back pain.  She has no complaints actually whatsoever.  Her exam here reveals symmetric pulses as well as symmetric blood pressures in her upper extremities.  Given her initial presentation however dissection will be excluded with CTA, local be  obtained and she will be reevaluated ------ blood work is reassuring.  CT scan reveals severe proximal left subclavian artery stenosis which likely explains her asymmetric blood pressures.  She denies any symptoms consistent with claudication, at this point I suspect this is all asymptomatic although I will refer her to vascular surgery for further evaluation.  Some incidental findings also noted on CT scan include pulmonary nodules and calcification within the gallbladder fundus, both of which will be followed up on by the PCP.  The patient remains asymptomatic, she feels well, she is being discharged home in stable condition      DISPOSITION: Discharged home in stable condition      FINAL IMPRESSION  1. Peripheral arterial disease (HCC)    2. Pulmonary nodule    3. Calcification of gallbladder           This dictation was created using voice recognition software. The accuracy of the dictation is limited to the abilities of the software. I expect there may be some errors of grammar and possibly content. The nursing notes were reviewed and certain aspects of this information were incorporated into this note.    Electronically signed by: Yared Richardson M.D., 10/12/2021 1:12 PM

## 2021-10-12 NOTE — ASSESSMENT & PLAN NOTE
This is a chronic condition.  Suicidal ideation/homicidal ideation: none  Therapy/counseling: None  Medications: Lixapro 20 mg daily and hydroxyzine PRN  Improving/worsening: Stable    Depression Screen (PHQ-2/PHQ-9) 5/19/2021 9/7/2021 10/12/2021   PHQ-2 Total Score 6 3 1   PHQ-9 Total Score 8 14 10       Interpretation of PHQ-9 Total Score   Score Severity   1-4 No Depression   5-9 Mild Depression   10-14 Moderate Depression   15-19 Moderately Severe Depression   20-27 Severe Depression

## 2021-10-13 ENCOUNTER — OFFICE VISIT (OUTPATIENT)
Dept: SLEEP MEDICINE | Facility: MEDICAL CENTER | Age: 61
End: 2021-10-13
Payer: COMMERCIAL

## 2021-10-13 VITALS
SYSTOLIC BLOOD PRESSURE: 146 MMHG | OXYGEN SATURATION: 94 % | HEIGHT: 62 IN | WEIGHT: 181 LBS | HEART RATE: 99 BPM | DIASTOLIC BLOOD PRESSURE: 74 MMHG | BODY MASS INDEX: 33.31 KG/M2 | RESPIRATION RATE: 16 BRPM | TEMPERATURE: 98.4 F

## 2021-10-13 DIAGNOSIS — E66.9 CLASS 1 OBESITY WITH BODY MASS INDEX (BMI) OF 33.0 TO 33.9 IN ADULT, UNSPECIFIED OBESITY TYPE, UNSPECIFIED WHETHER SERIOUS COMORBIDITY PRESENT: ICD-10-CM

## 2021-10-13 DIAGNOSIS — R91.8 LUNG NODULES: ICD-10-CM

## 2021-10-13 DIAGNOSIS — J44.9 CHRONIC OBSTRUCTIVE PULMONARY DISEASE, UNSPECIFIED COPD TYPE (HCC): ICD-10-CM

## 2021-10-13 DIAGNOSIS — Z72.0 TOBACCO USE: ICD-10-CM

## 2021-10-13 DIAGNOSIS — J96.11 CHRONIC RESPIRATORY FAILURE WITH HYPOXIA (HCC): ICD-10-CM

## 2021-10-13 PROCEDURE — 99204 OFFICE O/P NEW MOD 45 MIN: CPT | Mod: 25 | Performed by: INTERNAL MEDICINE

## 2021-10-13 PROCEDURE — 94761 N-INVAS EAR/PLS OXIMETRY MLT: CPT | Performed by: INTERNAL MEDICINE

## 2021-10-13 RX ORDER — VARENICLINE TARTRATE 1 MG/1
TABLET, FILM COATED ORAL
Qty: 60 TABLET | Refills: 3 | Status: SHIPPED | OUTPATIENT
Start: 2021-10-13 | End: 2021-11-15

## 2021-10-13 ASSESSMENT — ENCOUNTER SYMPTOMS
TREMORS: 0
FALLS: 0
ABDOMINAL PAIN: 0
EYE PAIN: 0
WEIGHT LOSS: 0
SPUTUM PRODUCTION: 0
WHEEZING: 0
HEMOPTYSIS: 0
DIARRHEA: 0
EYE DISCHARGE: 0
DIAPHORESIS: 0
DIZZINESS: 0
VOMITING: 0
WEAKNESS: 0
NECK PAIN: 0
PALPITATIONS: 0
HEARTBURN: 0
NAUSEA: 0
EYE REDNESS: 0
SPEECH CHANGE: 0
BACK PAIN: 0
CHILLS: 0
DEPRESSION: 0
CLAUDICATION: 0
ORTHOPNEA: 0
DOUBLE VISION: 0
BLURRED VISION: 0
CONSTIPATION: 0
SHORTNESS OF BREATH: 1
STRIDOR: 0
COUGH: 1
MYALGIAS: 0
PHOTOPHOBIA: 0
FOCAL WEAKNESS: 0
PND: 0
SINUS PAIN: 0
SORE THROAT: 0
HEADACHES: 0
FEVER: 0

## 2021-10-13 ASSESSMENT — FIBROSIS 4 INDEX: FIB4 SCORE: 1.11

## 2021-10-13 NOTE — PROCEDURES
Multi-Ox Readings  Multi Ox #1 2 LPM   O2 sat % at rest 96   O2 sat % on exertion 92   O2 sat average on exertion 93   Multi Ox #2     O2 sat % at rest     O2 sat % on exertion     O2 sat average on exertion       Oxygen Use     Oxygen Frequency     Duration of need     Is the patient mobile within the home?     CPAP Use?     BIPAP Use?     Servo Titration

## 2021-10-13 NOTE — PROGRESS NOTES
Chief Complaint   Patient presents with   • Establish Care     referral 5/19/21 bear Brasher DX COPD    • Results     CT CTA 10/12/21, CT Lung cancer screening 6/16/21. ER Hypertension 10/12/21        HPI: This patient is a 60 y.o. female presenting for evaluation of COPD.  Pt PMHx is significant for HLP, HTN and COPD previously followed at [facility] 6 years ago. She is an active smoker, 3/4 ppd and has smoked up to 1 ppd for the past 40 years. She is retired, no known occupational exposures. She is on advair and spiriva and prn PAULETTE via [facility]. No recent PFTs. Last exacerbation in late 2019 in [place]. She denies difficulty with airway clearance. No significant cough. She uses O2 at night and has done so for 6 years. She was hypoxic on arrival on RA at 83% and is SOB ambulating <100ft. She had lung Ca screening CT in June demonstrating emphysema, LAURA granuloma and 2 mm nodule. A CTA done yesterday to evaluate unequal BP measurements in opposite upper extremities yesterday showed new 5 MM RUL GGO. Pt denies fever/chills/night sweats. She has no known occupational exposures.  No family hx of atopic or autoimmune dz. She does have other family members with COPD all of whom are tobacco users.     Past Medical History:   Diagnosis Date   • COPD (chronic obstructive pulmonary disease) (HCC)    • Essential hypertension 5/19/2021   • GILL (generalized anxiety disorder) 5/19/2021   • GERD (gastroesophageal reflux disease)    • Mild episode of recurrent major depressive disorder (HCC) 5/19/2021   • Mixed dyslipidemia 6/2/2021       Social History     Socioeconomic History   • Marital status:      Spouse name: Not on file   • Number of children: Not on file   • Years of education: Not on file   • Highest education level: Not on file   Occupational History   • Not on file   Tobacco Use   • Smoking status: Current Every Day Smoker     Packs/day: 1.00     Years: 40.00     Pack years: 40.00     Types: Cigarettes   • Smokeless  tobacco: Never Used   Vaping Use   • Vaping Use: Never used   Substance and Sexual Activity   • Alcohol use: Yes     Alcohol/week: 12.6 - 14.4 oz     Types: 21 - 24 Cans of beer per week     Comment: 4 beers per day   • Drug use: Not Currently   • Sexual activity: Yes     Partners: Male   Other Topics Concern   • Not on file   Social History Narrative   • Not on file     Social Determinants of Health     Financial Resource Strain:    • Difficulty of Paying Living Expenses:    Food Insecurity:    • Worried About Running Out of Food in the Last Year:    • Ran Out of Food in the Last Year:    Transportation Needs:    • Lack of Transportation (Medical):    • Lack of Transportation (Non-Medical):    Physical Activity:    • Days of Exercise per Week:    • Minutes of Exercise per Session:    Stress:    • Feeling of Stress :    Social Connections:    • Frequency of Communication with Friends and Family:    • Frequency of Social Gatherings with Friends and Family:    • Attends [employer] Services:    • Active Member of Clubs or Organizations:    • Attends Club or Organization Meetings:    • Marital Status:    Intimate Partner Violence:    • Fear of Current or Ex-Partner:    • Emotionally Abused:    • Physically Abused:    • Sexually Abused:        Family History   Problem Relation Age of Onset   • Kidney Disease Mother    • Heart Disease Mother    • Stroke Mother    • Diabetes Mother    • Diabetes Father    • Kidney Disease Father    • Arrythmia Father        Current Outpatient Medications on File Prior to Visit   Medication Sig Dispense Refill   • amLODIPine (NORVASC) 5 MG Tab Take 1 Tablet by mouth every day. 90 Tablet 3   • escitalopram (LEXAPRO) 20 MG tablet Take 1 Tablet by mouth every day. 90 Tablet 3   • lisinopril (PRINIVIL) 10 MG Tab Take 1 tablet by mouth every day. 90 tablet 3   • fluticasone-salmeterol (ADVAIR HFA) 230-21 MCG/ACT inhaler Inhale 2 Puffs 2 times a day. 12 g 11   • tiotropium (SPIRIVA RESPIMAT) 2.5  "mcg/Act Aero Soln Inhale 2 Inhalation every day. 4 g 11   • albuterol (PROAIR HFA) 108 (90 Base) MCG/ACT Aero Soln inhalation aerosol Inhale 2 Puffs every 6 hours as needed for Shortness of Breath. 18 g 6   • hydrOXYzine HCl (ATARAX) 25 MG Tab Take 1 tablet by mouth 3 times a day as needed for Anxiety. 30 tablet 0   • atorvastatin (LIPITOR) 20 MG Tab Take 1 tablet by mouth every evening. 90 tablet 1   • albuterol 108 (90 Base) MCG/ACT Aero Soln inhalation aerosol 2 PUFFS EVERY 4 HOURS ONLY IF NEEDED FOR COUGH, WHEEZING, OR SHORTNESS OF BREATH. 8.5 g 5     No current facility-administered medications on file prior to visit.       Allergies: Penicillins    ROS:   Review of Systems   Constitutional: Negative for chills, diaphoresis, fever, malaise/fatigue and weight loss.   HENT: Negative for congestion, ear discharge, ear pain, hearing loss, nosebleeds, sinus pain, sore throat and tinnitus.    Eyes: Negative for blurred vision, double vision, photophobia, pain, discharge and redness.   Respiratory: Positive for cough and shortness of breath. Negative for hemoptysis, sputum production, wheezing and stridor.    Cardiovascular: Negative for chest pain, palpitations, orthopnea, claudication, leg swelling and PND.   Gastrointestinal: Negative for abdominal pain, constipation, diarrhea, heartburn, nausea and vomiting.   Genitourinary: Negative for dysuria and urgency.   Musculoskeletal: Negative for back pain, falls, joint pain, myalgias and neck pain.   Skin: Negative for itching and rash.   Neurological: Negative for dizziness, tremors, speech change, focal weakness, weakness and headaches.   Endo/Heme/Allergies: Negative for environmental allergies.   Psychiatric/Behavioral: Negative for depression.       /74 (BP Location: Right arm, Patient Position: Sitting, BP Cuff Size: Adult)   Pulse 99   Temp 36.9 °C (98.4 °F) (Temporal)   Resp 16   Ht 1.575 m (5' 2\")   Wt 82.1 kg (181 lb)   SpO2 94%     Physical " Exam:  Physical Exam  Vitals reviewed.   Constitutional:       Appearance: Normal appearance. She is well-developed. She is obese.   HENT:      Head: Normocephalic and atraumatic.      Right Ear: External ear normal.      Left Ear: External ear normal.      Nose: Nose normal. No congestion.      Mouth/Throat:      Mouth: Mucous membranes are moist.      Pharynx: Oropharynx is clear. No oropharyngeal exudate.   Eyes:      General: No scleral icterus.     Extraocular Movements: Extraocular movements intact.      Conjunctiva/sclera: Conjunctivae normal.      Pupils: Pupils are equal, round, and reactive to light.   Neck:      Vascular: No JVD.      Trachea: No tracheal deviation.   Cardiovascular:      Rate and Rhythm: Normal rate and regular rhythm.      Heart sounds: Normal heart sounds. No murmur heard.   No friction rub. No gallop.    Pulmonary:      Effort: Pulmonary effort is normal. No accessory muscle usage or respiratory distress.      Breath sounds: No wheezing or rales.      Comments: Decreased air movement throughout  Abdominal:      General: There is no distension.      Palpations: Abdomen is soft.      Tenderness: There is no abdominal tenderness.   Musculoskeletal:         General: No tenderness or deformity. Normal range of motion.      Cervical back: Normal range of motion and neck supple.      Right lower leg: No edema.      Left lower leg: No edema.   Lymphadenopathy:      Cervical: No cervical adenopathy.   Skin:     General: Skin is warm and dry.      Findings: No rash.      Nails: There is no clubbing.   Neurological:      Mental Status: She is alert and oriented to person, place, and time.      Cranial Nerves: No cranial nerve deficit.      Gait: Gait normal.   Psychiatric:         Mood and Affect: Mood normal.         Behavior: Behavior normal.         PFTs as reviewed by me personally: pending    Imaging as reviewed by me personally: as per hPI    Assessment:  1. Chronic obstructive pulmonary  disease, unspecified COPD type (HCC)  PULMONARY FUNCTION TESTS -Test requested: Complete Pulmonary Function Test   2. Chronic respiratory failure with hypoxia (HCC)  PULMONARY FUNCTION TESTS -Test requested: Complete Pulmonary Function Test   3. Tobacco use  varenicline (CHANTIX CONTINUING MONTH PAK) 1 MG tablet    varenicline (CHANTIX STARTING MONTH PAK) 0.5 MG X 11 & 1 MG X 42 tablet   4. Lung nodules  CT-CHEST (THORAX) W/O   5. Class 1 obesity with body mass index (BMI) of 33.0 to 33.9 in adult, unspecified obesity type, unspecified whether serious comorbidity present         Plan:  1. Chronic dx but new to me. Based on exam and breathing pattern, I suspect severe although she has not had recurrent exacerbations. Continue advair and spiriva, prn PAULETTE; start supplemental O2 with activity and obtain PFTS. She was counseled on tobacco cessation  2. Chronic but progressive and likely due to COPD. We will start supplemental O2 and pending PFTs consider referral to [place] pulmonary re[facility]  3. Pt counseled on tobacco cessation. She would like to try chantix. Side effect warnings discussed.  4. New GGO RUL on CT from yesterday not present in June. Given high risk pt will repeat in January  5. Encouraged healthy lifestyle habits  Return in about 5 months (around 3/13/2022) for with PFTs same day; pt prefers 11-1 or 2 PM appointment time, CT chest.

## 2021-10-20 ENCOUNTER — TELEPHONE (OUTPATIENT)
Dept: SLEEP MEDICINE | Facility: MEDICAL CENTER | Age: 61
End: 2021-10-20

## 2021-10-20 NOTE — TELEPHONE ENCOUNTER
"I spoke with June patient's daughter in law (on release) regarding OCD that we order with [facility] in [place]. [facility] DOES NOT have available oxygen equipment at this time and asked that patient go through [email or web address]. They already have a Home Concentrator with [facility] but they have NOT been good at providing supplies or equipment for this patient so we were wanting to change DME Companies.     Patient to stick with [email or web address] at this time. Orders for oxygen sent to them patient needing portable oxygen device \" WINE BOTTLE SIZE TANKS\" Orders faxed with multi-oximetry testing and office notes. Confirmations scanned into the account.  "

## 2021-10-23 ENCOUNTER — OFFICE VISIT (OUTPATIENT)
Dept: URGENT CARE | Facility: PHYSICIAN GROUP | Age: 61
End: 2021-10-23
Payer: COMMERCIAL

## 2021-10-23 VITALS
HEART RATE: 93 BPM | RESPIRATION RATE: 18 BRPM | OXYGEN SATURATION: 92 % | HEIGHT: 63 IN | WEIGHT: 176 LBS | TEMPERATURE: 97.9 F | SYSTOLIC BLOOD PRESSURE: 110 MMHG | BODY MASS INDEX: 31.18 KG/M2 | DIASTOLIC BLOOD PRESSURE: 68 MMHG

## 2021-10-23 DIAGNOSIS — L30.4 INTERTRIGO: ICD-10-CM

## 2021-10-23 PROCEDURE — 99213 OFFICE O/P EST LOW 20 MIN: CPT | Performed by: FAMILY MEDICINE

## 2021-10-23 RX ORDER — FLUCONAZOLE 150 MG/1
150 TABLET ORAL DAILY
Qty: 3 TABLET | Refills: 0 | Status: SHIPPED | OUTPATIENT
Start: 2021-10-23 | End: 2021-10-26

## 2021-10-23 RX ORDER — CLOTRIMAZOLE AND BETAMETHASONE DIPROPIONATE 10; .64 MG/G; MG/G
CREAM TOPICAL
Qty: 45 G | Refills: 2 | Status: SHIPPED | OUTPATIENT
Start: 2021-10-23 | End: 2022-05-16

## 2021-10-23 ASSESSMENT — FIBROSIS 4 INDEX: FIB4 SCORE: 1.11

## 2021-10-23 NOTE — PROGRESS NOTES
Chief Complaint:    Chief Complaint   Patient presents with   • Rash     under (L) breast x2 days        History of Present Illness:    Family present. She has rash under left breast x 2 days. No similar previous episodes.      Past Medical History:    Past Medical History:   Diagnosis Date   • COPD (chronic obstructive pulmonary disease) (HCC)    • Essential hypertension 5/19/2021   • GILL (generalized anxiety disorder) 5/19/2021   • GERD (gastroesophageal reflux disease)    • Mild episode of recurrent major depressive disorder (HCC) 5/19/2021   • Mixed dyslipidemia 6/2/2021     Past Surgical History:    History reviewed. No pertinent surgical history.    Social History:    Social History     Socioeconomic History   • Marital status:      Spouse name: Not on file   • Number of children: Not on file   • Years of education: Not on file   • Highest education level: Not on file   Occupational History   • Not on file   Tobacco Use   • Smoking status: Current Every Day Smoker     Packs/day: 1.00     Years: 40.00     Pack years: 40.00     Types: Cigarettes   • Smokeless tobacco: Never Used   Vaping Use   • Vaping Use: Never used   Substance and Sexual Activity   • Alcohol use: Yes     Alcohol/week: 12.6 - 14.4 oz     Types: 21 - 24 Cans of beer per week     Comment: 4 beers per day   • Drug use: Not Currently   • Sexual activity: Yes     Partners: Male   Other Topics Concern   • Not on file   Social History Narrative   • Not on file     Social Determinants of Health     Financial Resource Strain:    • Difficulty of Paying Living Expenses:    Food Insecurity:    • Worried About Running Out of Food in the Last Year:    • Ran Out of Food in the Last Year:    Transportation Needs:    • Lack of Transportation (Medical):    • Lack of Transportation (Non-Medical):    Physical Activity:    • Days of Exercise per Week:    • Minutes of Exercise per Session:    Stress:    • Feeling of Stress :    Social Connections:    •  Frequency of Communication with Friends and Family:    • Frequency of Social Gatherings with Friends and Family:    • Attends [employer] Services:    • Active Member of Clubs or Organizations:    • Attends Club or Organization Meetings:    • Marital Status:    Intimate Partner Violence:    • Fear of Current or Ex-Partner:    • Emotionally Abused:    • Physically Abused:    • Sexually Abused:      Family History:    Family History   Problem Relation Age of Onset   • Kidney Disease Mother    • Heart Disease Mother    • Stroke Mother    • Diabetes Mother    • Diabetes Father    • Kidney Disease Father    • Arrythmia Father      Medications:    Current Outpatient Medications on File Prior to Visit   Medication Sig Dispense Refill   • varenicline (CHANTIX CONTINUING MONTH PAK) 1 MG tablet Take as directed per package instructions. 60 Tablet 3   • varenicline (CHANTIX STARTING MONTH SOFÍA) 0.5 MG X 11 & 1 MG X 42 tablet Take as directed per package instructions. 56 Each 0   • amLODIPine (NORVASC) 5 MG Tab Take 1 Tablet by mouth every day. 90 Tablet 3   • escitalopram (LEXAPRO) 20 MG tablet Take 1 Tablet by mouth every day. 90 Tablet 3   • atorvastatin (LIPITOR) 20 MG Tab Take 1 tablet by mouth every evening. 90 tablet 1   • lisinopril (PRINIVIL) 10 MG Tab Take 1 tablet by mouth every day. 90 tablet 3   • fluticasone-salmeterol (ADVAIR HFA) 230-21 MCG/ACT inhaler Inhale 2 Puffs 2 times a day. 12 g 11   • tiotropium (SPIRIVA RESPIMAT) 2.5 mcg/Act Aero Soln Inhale 2 Inhalation every day. 4 g 11   • albuterol (PROAIR HFA) 108 (90 Base) MCG/ACT Aero Soln inhalation aerosol Inhale 2 Puffs every 6 hours as needed for Shortness of Breath. 18 g 6   • hydrOXYzine HCl (ATARAX) 25 MG Tab Take 1 tablet by mouth 3 times a day as needed for Anxiety. 30 tablet 0     No current facility-administered medications on file prior to visit.     Allergies:    Allergies   Allergen Reactions   • Penicillins        Vitals:    Vitals:    10/23/21 1154  "10/23/21 1157   BP: (!) 172/84 110/68   Pulse: 93    Resp: 18    Temp: 36.6 °C (97.9 °F)    SpO2: 92%    Weight: 79.8 kg (176 lb)    Height: 1.6 m (5' 3\")        Physical Exam:    Constitutional: Vital signs reviewed. Appears well-developed and well-nourished. No acute distress.   Eyes: Sclera white, conjunctivae clear.   ENT: External ears normal. Hearing normal.   Neck: Neck supple.   Pulmonary/Chest: Respirations non-labored.   Musculoskeletal: Normal gait. No muscular atrophy or weakness.  Neurological: Alert and oriented to person, place, and time. Muscle tone normal. Coordination normal.   Skin: Large breasts, inferior to left breast, diffuse, moist, erythematous rash with satellite lesions.  Psychiatric: Normal mood and affect. Behavior is normal. Judgment and thought content normal.       Medical Decision Makin. [facility]  - fluconazole (DIFLUCAN) 150 MG tablet; Take 1 Tablet by mouth every day for 3 days.  Dispense: 3 Tablet; Refill: 0  - clotrimazole-betamethasone (LOTRISONE) 1-0.05 % Cream; APPLY THIN FILM TO RASH UP TO TWICE A DAY ONLY IF NEEDED.  Dispense: 45 g; Refill: 2      Discussed with them DDX, management options, and risks, benefits, and alternatives to treatment plan agreed upon.    Family present. She has rash under left breast x 2 days. No similar previous episodes.     Large breasts, inferior to left breast, diffuse, moist, erythematous rash with satellite lesions.    Exam consistent with Candida infection. She desires aggressive treatment.    Agreeable to medications prescribed.    Discussed expected course of duration, time for improvement, and to seek follow-up in Emergency Room, urgent care, or with PCP if getting worse at any time or not improving within expected time frame.  "

## 2021-11-15 ENCOUNTER — OFFICE VISIT (OUTPATIENT)
Dept: MEDICAL GROUP | Facility: PHYSICIAN GROUP | Age: 61
End: 2021-11-15
Payer: COMMERCIAL

## 2021-11-15 VITALS
DIASTOLIC BLOOD PRESSURE: 86 MMHG | OXYGEN SATURATION: 96 % | HEART RATE: 97 BPM | HEIGHT: 62 IN | WEIGHT: 181.4 LBS | RESPIRATION RATE: 16 BRPM | TEMPERATURE: 97.8 F | SYSTOLIC BLOOD PRESSURE: 122 MMHG | BODY MASS INDEX: 33.38 KG/M2

## 2021-11-15 DIAGNOSIS — Z23 NEED FOR VACCINATION: ICD-10-CM

## 2021-11-15 DIAGNOSIS — E78.2 MIXED DYSLIPIDEMIA: ICD-10-CM

## 2021-11-15 DIAGNOSIS — J44.89 COPD (CHRONIC OBSTRUCTIVE PULMONARY DISEASE) WITH CHRONIC BRONCHITIS (HCC): ICD-10-CM

## 2021-11-15 DIAGNOSIS — Z09 HOSPITAL DISCHARGE FOLLOW-UP: ICD-10-CM

## 2021-11-15 DIAGNOSIS — F17.210 TOBACCO DEPENDENCE DUE TO CIGARETTES: ICD-10-CM

## 2021-11-15 DIAGNOSIS — I10 ESSENTIAL HYPERTENSION: ICD-10-CM

## 2021-11-15 PROCEDURE — 90471 IMMUNIZATION ADMIN: CPT | Performed by: NURSE PRACTITIONER

## 2021-11-15 PROCEDURE — 99214 OFFICE O/P EST MOD 30 MIN: CPT | Mod: 25 | Performed by: NURSE PRACTITIONER

## 2021-11-15 PROCEDURE — 90686 IIV4 VACC NO PRSV 0.5 ML IM: CPT | Performed by: NURSE PRACTITIONER

## 2021-11-15 RX ORDER — ATORVASTATIN CALCIUM 20 MG/1
20 TABLET, FILM COATED ORAL EVERY EVENING
Qty: 90 TABLET | Refills: 3 | Status: SHIPPED | OUTPATIENT
Start: 2021-11-15 | End: 2023-07-31 | Stop reason: SDUPTHER

## 2021-11-15 ASSESSMENT — FIBROSIS 4 INDEX: FIB4 SCORE: 1.11

## 2021-11-15 NOTE — ASSESSMENT & PLAN NOTE
This is a chronic and ongoing health concern.  She is followed closely with pulmonology.  Last appointment was October 13, 2021.  During that visit they started her on oxygen via nasal cannula however [facility] has told him that they did not receive the order.  I have reprinted out the order and given it to her so that she may bring it to [facility] and have her oxygen filled.  Patient denies any increase or change in her shortness of breath symptoms, no chest pain, no vision changes, no syncopal episodes.    She is currently on albuterol as needed, Advair, and Spiriva.  She denies any new refills at this time and is tolerating his medications well.

## 2021-11-15 NOTE — ASSESSMENT & PLAN NOTE
Blood pressures well controlled in clinic today of 122/86.  She is currently taking amlodipine 5 mg tablet daily and lisinopril 10 mg tablet daily.  She reports that she is tolerating this medication well and denies any adverse effects.  She denies needing any refills of these medications today.   She denies any palpitations, chest pain, or vision changes.  She does report that she has mild headaches occasionally.  Discussed ER precautions.

## 2021-11-15 NOTE — PROGRESS NOTES
Chief Complaint   Patient presents with   • Follow-Up     hypertesion        Subjective:     HPI:   Kristel Sewell is a 60 y.o. female here to discuss the evaluation and management of:      Hospital discharge follow-up  Patient was seen in the emergency department on 10- for differing blood pressures bilaterally.  CTA was performed:  IMPRESSION:     1.  No aortic aneurysm or dissection.  2.  Moderately advanced atherosclerosis. Severe proximal left subclavian artery stenosis.  3.  Emphysematous changes.    Patient reports today that she does have follow-up scheduled with vascular medicine to have removal of atherosclerosis in her left subclavian artery.  Today patient denies any headaches, neck pain, chest pain, or arm pain.  Overall she states that she is feeling much better.      Essential hypertension  Blood pressures well controlled in clinic today of 122/86.  She is currently taking amlodipine 5 mg tablet daily and lisinopril 10 mg tablet daily.  She reports that she is tolerating this medication well and denies any adverse effects.  She denies needing any refills of these medications today.   She denies any palpitations, chest pain, or vision changes.  She does report that she has mild headaches occasionally.  Discussed ER precautions.    COPD (chronic obstructive pulmonary disease) with chronic bronchitis (HCC)  This is a chronic and ongoing health concern.  She is followed closely with pulmonology.  Last appointment was October 13, 2021.  During that visit they started her on oxygen via nasal cannula however [facility] has told him that they did not receive the order.  I have reprinted out the order and given it to her so that she may bring it to [facility] and have her oxygen filled.  Patient denies any increase or change in her shortness of breath symptoms, no chest pain, no vision changes, no syncopal episodes.    She is currently on albuterol as needed, Advair, and Spiriva.  She denies any new  refills at this time and is tolerating his medications well.    Tobacco dependence due to cigarettes  This is a chronic and ongoing health concern.  Patient is aware of the risks associated with continuing to smoke cigarettes.  She reports that her pulmonologist did give her a prescription of Chantix however she has not started it yet.  Highly encourage patient to start on Chantix.  The health benefits associated with quitting smoking.  Patient was agreeable to this and will start Chantix tomorrow.  Discussed smoking sensation for approximately 3 to 4 minutes during exam today.    ROS:  Gen: no fevers/chills, no changes in weight  Eyes: no changes in vision  ENT: no sore throat, no hearing loss, no bloody nose  Pulm: Positive per HPI  CV: no chest pain, no palpitations  GI: no nausea/vomiting, no diarrhea  MSk: no myalgias  Skin: no rash  Neuro: Positive per HPI       Allergies   Allergen Reactions   • Penicillins        Current medicines (including changes today)  Current Outpatient Medications   Medication Sig Dispense Refill   • APO-VARENICLINE 0.5 MG tablet      • atorvastatin (LIPITOR) 20 MG Tab Take 1 Tablet by mouth every evening. 90 Tablet 3   • clotrimazole-betamethasone (LOTRISONE) 1-0.05 % Cream APPLY THIN FILM TO RASH UP TO TWICE A DAY ONLY IF NEEDED. 45 g 2   • amLODIPine (NORVASC) 5 MG Tab Take 1 Tablet by mouth every day. 90 Tablet 3   • escitalopram (LEXAPRO) 20 MG tablet Take 1 Tablet by mouth every day. 90 Tablet 3   • lisinopril (PRINIVIL) 10 MG Tab Take 1 tablet by mouth every day. 90 tablet 3   • fluticasone-salmeterol (ADVAIR HFA) 230-21 MCG/ACT inhaler Inhale 2 Puffs 2 times a day. 12 g 11   • tiotropium (SPIRIVA RESPIMAT) 2.5 mcg/Act Aero Soln Inhale 2 Inhalation every day. 4 g 11   • albuterol (PROAIR HFA) 108 (90 Base) MCG/ACT Aero Soln inhalation aerosol Inhale 2 Puffs every 6 hours as needed for Shortness of Breath. 18 g 6   • hydrOXYzine HCl (ATARAX) 25 MG Tab Take 1 tablet by mouth 3  "times a day as needed for Anxiety. 30 tablet 0     No current facility-administered medications for this visit.       Social History     Tobacco Use   • Smoking status: Current Every Day Smoker     Packs/day: 1.00     Years: 40.00     Pack years: 40.00     Types: Cigarettes   • Smokeless tobacco: Never Used   Vaping Use   • Vaping Use: Never used   Substance Use Topics   • Alcohol use: Yes     Alcohol/week: 12.6 - 14.4 oz     Types: 21 - 24 Cans of beer per week     Comment: 4 beers per day   • Drug use: Not Currently       Patient Active Problem List    Diagnosis Date Noted   • Hospital discharge follow-up 11/15/2021   • Mixed dyslipidemia 06/02/2021   • Obesity (BMI 30-39.9) 06/02/2021   • GILL (generalized anxiety disorder) 05/19/2021   • Mild episode of recurrent major depressive disorder (HCC) 05/19/2021   • COPD (chronic obstructive pulmonary disease) with chronic bronchitis (HCC) 05/19/2021   • Tobacco dependence due to cigarettes 05/19/2021   • Essential hypertension 05/19/2021       Family History   Problem Relation Age of Onset   • Kidney Disease Mother    • Heart Disease Mother    • Stroke Mother    • Diabetes Mother    • Diabetes Father    • Kidney Disease Father    • Arrythmia Father           Objective:       /86   Pulse 97   Temp 36.6 °C (97.8 °F) (Temporal)   Resp 16   Ht 1.575 m (5' 2\")   Wt 82.3 kg (181 lb 6.4 oz)   SpO2 96%  Body mass index is 33.18 kg/m².    Physical Exam:  Constitutional: Well-developed and well-nourished female in NAD. Not diaphoretic. No distress.   Skin: warm, dry, intact, no evidence of rash or concerning lesions  Head: Atraumatic without lesions.  Eyes: Conjunctivae and extraocular motions are normal. Pupils are equal, round, and reactive to light. No scleral icterus.   Ears:  External ears unremarkable.  Cardiovascular: Regular rate and rhythm without murmur. Radial pulses are intact and equal bilaterally.  Pulmonary: Diminished throughout. Normal effort. No " rales, ronchi, or wheezing.  Abdomen: Soft, non tender, and without distention. Active bowel sounds in all four quadrants.   Extremities: No cyanosis, clubbing, erythema, nor edema.   Neurological: Alert and oriented x 3. No cranial nerve deficit. 5/5 myotomes. Sensation intact.   Psychiatric:  Behavior, mood, and affect are appropriate.    CTA 10/12/2021  IMPRESSION:     1.  No aortic aneurysm or dissection.  2.  Moderately advanced atherosclerosis. Severe proximal left subclavian artery stenosis.  3.  Emphysematous changes.  4.  New small irregular nodular opacities within the posterior right upper lobe which are indeterminate. These could be infectious/post inflammatory however cannot exclude malignancy. Follow-up noncontrast chest CT in 3-6 months is recommended.  5.  Moderate hiatal hernia.  6.  Indeterminate 5.2 cm right hepatic mass, probably a benign hemangioma.  7.  Calcification in the gallbladder fundus region which could be associated with focal wall thickening or represent dependent tiny stones. Right upper quadrant ultrasound is suggested.      Assessment and Plan:     The following treatment plan was discussed:    1. Hospital discharge follow-up  Reviewed hospital report, labs, and imaging.  Discussed results with patient and answered all questions.  Patient will follow closely with vascular medicine and pulmonology.  Strict ER precautions were discussed.    2. Essential hypertension  Chronic and stable condition.  Patient will continue on current medications as prescribed.  Patient will follow closely with vascular medicine.  She denies need refills.    3. Tobacco dependence due to cigarettes  Chronic and ongoing health concern.  Patient will will start Chantix tomorrow.  Highly encourage patient to quit smoking due to her health concerns.  Counseled patient about smoking sensations as well as the risks associated with smoking for approximately 3 to 4 minutes during exam today.    4. Mixed  dyslipidemia  Well controlled.  Labs as indicated.  Continue statin medication and lifestyle modifications.  Continue to monitor.  - atorvastatin (LIPITOR) 20 MG Tab; Take 1 Tablet by mouth every evening.  Dispense: 90 Tablet; Refill: 3    5. COPD (chronic obstructive pulmonary disease) with chronic bronchitis (HCC)  COPD is stable.  No symptoms or evidence of acute exacerbation.  Taking inhalers as prescribed.  Continue inhalers and monitor.  Action plan discussed and understood by the patient.    6. Need for vaccination  - INFLUENZA VACCINE QUAD INJ (PF)    Other orders  - APO-VARENICLINE 0.5 MG tablet    Any change or worsening of signs or symptoms, patient encouraged to follow-up or report to emergency room for further evaluation. Patient verbalizes understanding and agrees.    Follow-Up: Return in about 3 months (around 2/15/2022) for Follow up labs, HTN.      PLEASE NOTE: This dictation was created using voice recognition software. I have made every reasonable attempt to correct obvious errors, but I expect that there are errors of grammar and possibly content that I did not discover before finalizing the note.

## 2021-11-15 NOTE — ASSESSMENT & PLAN NOTE
Patient was seen in the emergency department on 10- for differing blood pressures bilaterally.  CTA was performed:  IMPRESSION:     1.  No aortic aneurysm or dissection.  2.  Moderately advanced atherosclerosis. Severe proximal left subclavian artery stenosis.  3.  Emphysematous changes.    Patient reports today that she does have follow-up scheduled with vascular medicine to have removal of atherosclerosis in her left subclavian artery.  Today patient denies any headaches, neck pain, chest pain, or arm pain.  Overall she states that she is feeling much better.

## 2021-11-15 NOTE — PATIENT INSTRUCTIONS
Gastroenterology      [facility]   [address] [place] [id number]  Phone: [phone]

## 2021-11-15 NOTE — ASSESSMENT & PLAN NOTE
This is a chronic and ongoing health concern.  Patient is aware of the risks associated with continuing to smoke cigarettes.  She reports that her pulmonologist did give her a prescription of Chantix however she has not started it yet.  Highly encourage patient to start on Chantix.  The health benefits associated with quitting smoking.  Patient was agreeable to this and will start Chantix tomorrow.  Discussed smoking sensation for approximately 3 to 4 minutes during exam today.

## 2021-11-24 ENCOUNTER — TELEPHONE (OUTPATIENT)
Dept: SLEEP MEDICINE | Facility: MEDICAL CENTER | Age: 61
End: 2021-11-24

## 2021-11-24 NOTE — TELEPHONE ENCOUNTER
Heike left message asking us to resend orders and to call her back. Tried to call patein but no one answered and the voice mail was not set up so I could no leave a message. Resent orders.

## 2022-02-15 ENCOUNTER — OFFICE VISIT (OUTPATIENT)
Dept: MEDICAL GROUP | Facility: PHYSICIAN GROUP | Age: 62
End: 2022-02-15
Payer: COMMERCIAL

## 2022-02-15 VITALS
BODY MASS INDEX: 35.15 KG/M2 | HEART RATE: 88 BPM | WEIGHT: 191 LBS | DIASTOLIC BLOOD PRESSURE: 70 MMHG | SYSTOLIC BLOOD PRESSURE: 120 MMHG | HEIGHT: 62 IN | OXYGEN SATURATION: 93 % | TEMPERATURE: 97.6 F | RESPIRATION RATE: 18 BRPM

## 2022-02-15 DIAGNOSIS — F17.210 TOBACCO DEPENDENCE DUE TO CIGARETTES: ICD-10-CM

## 2022-02-15 DIAGNOSIS — I10 ESSENTIAL HYPERTENSION: ICD-10-CM

## 2022-02-15 DIAGNOSIS — E78.5 DYSLIPIDEMIA: ICD-10-CM

## 2022-02-15 PROBLEM — K80.20 GALLSTONE: Status: ACTIVE | Noted: 2021-12-08

## 2022-02-15 PROBLEM — K21.9 GASTROESOPHAGEAL REFLUX DISEASE: Status: ACTIVE | Noted: 2021-12-08

## 2022-02-15 PROBLEM — R91.1 LUNG NODULE: Status: ACTIVE | Noted: 2021-12-08

## 2022-02-15 PROBLEM — I73.9 PERIPHERAL ARTERIAL DISEASE (HCC): Status: ACTIVE | Noted: 2021-12-08

## 2022-02-15 PROCEDURE — 99406 BEHAV CHNG SMOKING 3-10 MIN: CPT | Performed by: NURSE PRACTITIONER

## 2022-02-15 PROCEDURE — 99214 OFFICE O/P EST MOD 30 MIN: CPT | Mod: 25 | Performed by: NURSE PRACTITIONER

## 2022-02-15 RX ORDER — VARENICLINE TARTRATE 1 MG/1
TABLET, FILM COATED ORAL
COMMUNITY
End: 2022-03-17 | Stop reason: SDUPTHER

## 2022-02-15 ASSESSMENT — PATIENT HEALTH QUESTIONNAIRE - PHQ9: CLINICAL INTERPRETATION OF PHQ2 SCORE: 0

## 2022-02-15 ASSESSMENT — FIBROSIS 4 INDEX: FIB4 SCORE: 1.13

## 2022-02-15 NOTE — ASSESSMENT & PLAN NOTE
This is a chronic and ongoing health concern. Patient is down to half to quarter pack per day. She is currently taking Chantix.  Blood pressure today is well controlled at 120/70.  She is aware the risks associated with smoking cigarettes.    Plan  I congratulated her on her efforts and encouraged her to continue cutting back on her smoking and eventually quitting.  Counseled given for approximately 3 to 5 minutes.

## 2022-02-15 NOTE — PROGRESS NOTES
Chief Complaint   Patient presents with   • Follow-Up     htn, oxygen       Subjective:     HPI:   Kristel Sewell is a 61 y.o. female here to discuss the evaluation and management of:      Tobacco dependence due to cigarettes  This is a chronic and ongoing health concern. Patient is down to half to quarter pack per day. She is currently taking Chantix.  Blood pressure today is well controlled at 120/70.  She is aware the risks associated with smoking cigarettes.    Plan  I congratulated her on her efforts and encouraged her to continue cutting back on her smoking and eventually quitting.  Counseled given for approximately 3 to 5 minutes.    Essential hypertension  Chronic well-controlled condition. Patient blood pressure today in clinic is 120/70. Patient is currently taking amlodipine 5 mg tablet daily and lisinopril 10 mg daily.   She denies any dizziness, chest pain, palpitations, vision changes, or syncopal episodes.  Patient does continue to smoke cigarettes however she is down to half pack to quarter pack a day. She is currently taking Chantix.    She does have a chronic headache most likely due to chronic COPD with hypoxia. She is followed closely with pulmonology.    Plan  Patient will continue on amlodipine and lisinopril.  Encourage patient to quit smoking.  Discussed appropriate diet lifestyle modifications including exercise.    Dyslipidemia  Chronic stable condition. Patient is currently on atorvastatin 20 mg tablet daily and tolerating well. She denies any myalgias.    Patient did not get labs done prior to her appointment.    Plan  Encourage patient get labs done and will discuss with follow-up or over the phone.  Continue on atorvastatin. No refills needed      ROS:  Gen: no fevers/chills, no changes in weight  Pulm: no sob, no cough  CV: no chest pain, no palpitations  GI: no nausea/vomiting, no diarrhea  MSk: no myalgias  Neuro: no headaches, no numbness/tingling  Heme/Lymph: no easy  bruising    Allergies   Allergen Reactions   • Penicillins        Current medicines (including changes today)  Current Outpatient Medications   Medication Sig Dispense Refill   • atorvastatin (LIPITOR) 20 MG Tab Take 1 Tablet by mouth every evening. 90 Tablet 3   • amLODIPine (NORVASC) 5 MG Tab Take 1 Tablet by mouth every day. 90 Tablet 3   • escitalopram (LEXAPRO) 20 MG tablet Take 1 Tablet by mouth every day. 90 Tablet 3   • lisinopril (PRINIVIL) 10 MG Tab Take 1 tablet by mouth every day. 90 tablet 3   • fluticasone-salmeterol (ADVAIR HFA) 230-21 MCG/ACT inhaler Inhale 2 Puffs 2 times a day. 12 g 11   • tiotropium (SPIRIVA RESPIMAT) 2.5 mcg/Act Aero Soln Inhale 2 Inhalation every day. 4 g 11   • albuterol (PROAIR HFA) 108 (90 Base) MCG/ACT Aero Soln inhalation aerosol Inhale 2 Puffs every 6 hours as needed for Shortness of Breath. 18 g 6   • hydrOXYzine HCl (ATARAX) 25 MG Tab Take 1 tablet by mouth 3 times a day as needed for Anxiety. 30 tablet 0   • varenicline (CHANTIX) 1 MG tablet varenicline 1 mg tablet     • clotrimazole-betamethasone (LOTRISONE) 1-0.05 % Cream APPLY THIN FILM TO RASH UP TO TWICE A DAY ONLY IF NEEDED. (Patient not taking: Reported on 2/15/2022) 45 g 2     No current facility-administered medications for this visit.       Social History     Tobacco Use   • Smoking status: Current Some Day Smoker     Packs/day: 1.00     Years: 40.00     Pack years: 40.00     Types: Cigarettes   • Smokeless tobacco: Never Used   Vaping Use   • Vaping Use: Never used   Substance Use Topics   • Alcohol use: Yes     Alcohol/week: 12.6 - 14.4 oz     Types: 21 - 24 Cans of beer per week     Comment: 4 beers per day   • Drug use: Not Currently       Patient Active Problem List    Diagnosis Date Noted   • Peripheral arterial disease (HCC) 12/08/2021   • Lung nodule 12/08/2021   • Gastroesophageal reflux disease 12/08/2021   • Gallstone 12/08/2021   • Hospital discharge follow-up 11/15/2021   • Dyslipidemia 06/02/2021  "  • Obesity (BMI 30-39.9) 06/02/2021   • Generalized anxiety disorder 05/19/2021   • Recurrent major depressive episodes, mild (HCC) 05/19/2021   • COPD (chronic obstructive pulmonary disease) with chronic bronchitis (HCC) 05/19/2021   • Tobacco dependence due to cigarettes 05/19/2021   • Essential hypertension 05/19/2021       Family History   Problem Relation Age of Onset   • Kidney Disease Mother    • Heart Disease Mother    • Stroke Mother    • Diabetes Mother    • Diabetes Father    • Kidney Disease Father    • Arrythmia Father           Objective:     /70   Pulse 88   Temp 36.4 °C (97.6 °F) (Temporal)   Resp 18   Ht 1.575 m (5' 2\")   Wt 86.6 kg (191 lb)   SpO2 93%  Body mass index is 34.93 kg/m².    Physical Exam:  Constitutional: Well-developed and well-nourished female in NAD. Not diaphoretic. No distress.   Skin: warm, dry, intact, no evidence of rash or concerning lesions  Head: Atraumatic without lesions.  Eyes: Conjunctivae and extraocular motions are normal. Pupils are equal, round, and reactive to light. No scleral icterus.   Cardiovascular: Regular rate and rhythm without murmur. Radial pulses are intact and equal bilaterally.  Pulmonary: Clear to ausculation. Normal effort. No rales, ronchi, or wheezing.  Abdomen: Soft, non tender, and without distention. Active bowel sounds in all four quadrants. No rebound, guarding, masses   Extremities: No cyanosis, clubbing, erythema. Bilateral lower leg edema 1+.  Neurological: Alert and oriented x 3. No cranial nerve deficit. 5/5 myotomes. Sensation intact.   Psychiatric:  Behavior, mood, and affect are appropriate.       Assessment and Plan:     The following treatment plan was discussed:  Reminded patient to schedule colonoscopy and mammogram. Information was given and discharge instructions.      1. Tobacco dependence due to cigarettes    2. Essential hypertension    3. Dyslipidemia    Other orders  - varenicline (CHANTIX) 1 MG tablet; " varenicline 1 mg tablet      Any change or worsening of signs or symptoms, patient encouraged to follow-up or report to emergency room for further evaluation. Patient verbalizes understanding and agrees.    Follow-Up: Return in about 3 months (around 5/15/2022) for PAP, 6 months with labs .      PLEASE NOTE: This dictation was created using voice recognition software. I have made every reasonable attempt to correct obvious errors, but I expect that there are errors of grammar and possibly content that I did not discover before finalizing the note.

## 2022-02-15 NOTE — ASSESSMENT & PLAN NOTE
Chronic well-controlled condition. Patient blood pressure today in clinic is 120/70. Patient is currently taking amlodipine 5 mg tablet daily and lisinopril 10 mg daily.   She denies any dizziness, chest pain, palpitations, vision changes, or syncopal episodes.  Patient does continue to smoke cigarettes however she is down to half pack to quarter pack a day. She is currently taking Chantix.    She does have a chronic headache most likely due to chronic COPD with hypoxia. She is followed closely with pulmonology.    Plan  Patient will continue on amlodipine and lisinopril.  Encourage patient to quit smoking.  Discussed appropriate diet lifestyle modifications including exercise.

## 2022-02-15 NOTE — ASSESSMENT & PLAN NOTE
Chronic stable condition. Patient is currently on atorvastatin 20 mg tablet daily and tolerating well. She denies any myalgias.    Patient did not get labs done prior to her appointment.    Plan  Encourage patient get labs done and will discuss with follow-up or over the phone.  Continue on atorvastatin. No refills needed

## 2022-02-22 ENCOUNTER — HOSPITAL ENCOUNTER (OUTPATIENT)
Dept: LAB | Facility: MEDICAL CENTER | Age: 62
End: 2022-02-22
Attending: NURSE PRACTITIONER
Payer: COMMERCIAL

## 2022-02-22 DIAGNOSIS — Z13.21 ENCOUNTER FOR VITAMIN DEFICIENCY SCREENING: ICD-10-CM

## 2022-02-22 DIAGNOSIS — E78.2 MIXED DYSLIPIDEMIA: ICD-10-CM

## 2022-02-22 LAB
25(OH)D3 SERPL-MCNC: <5 NG/ML (ref 30–100)
ALBUMIN SERPL BCP-MCNC: 4.4 G/DL (ref 3.2–4.9)
ALBUMIN/GLOB SERPL: 1.6 G/DL
ALP SERPL-CCNC: 92 U/L (ref 30–99)
ALT SERPL-CCNC: 12 U/L (ref 2–50)
ANION GAP SERPL CALC-SCNC: 9 MMOL/L (ref 7–16)
AST SERPL-CCNC: 19 U/L (ref 12–45)
BILIRUB SERPL-MCNC: 0.5 MG/DL (ref 0.1–1.5)
BUN SERPL-MCNC: 7 MG/DL (ref 8–22)
CALCIUM SERPL-MCNC: 9 MG/DL (ref 8.5–10.5)
CHLORIDE SERPL-SCNC: 100 MMOL/L (ref 96–112)
CHOLEST SERPL-MCNC: 208 MG/DL (ref 100–199)
CO2 SERPL-SCNC: 27 MMOL/L (ref 20–33)
CREAT SERPL-MCNC: 0.75 MG/DL (ref 0.5–1.4)
FASTING STATUS PATIENT QL REPORTED: NORMAL
FOLATE SERPL-MCNC: 8.2 NG/ML
GLOBULIN SER CALC-MCNC: 2.7 G/DL (ref 1.9–3.5)
GLUCOSE SERPL-MCNC: 81 MG/DL (ref 65–99)
HDLC SERPL-MCNC: 53 MG/DL
LDLC SERPL CALC-MCNC: 134 MG/DL
POTASSIUM SERPL-SCNC: 5.1 MMOL/L (ref 3.6–5.5)
PROT SERPL-MCNC: 7.1 G/DL (ref 6–8.2)
SODIUM SERPL-SCNC: 136 MMOL/L (ref 135–145)
TRIGL SERPL-MCNC: 106 MG/DL (ref 0–149)
VIT B12 SERPL-MCNC: 305 PG/ML (ref 211–911)

## 2022-02-22 PROCEDURE — 80053 COMPREHEN METABOLIC PANEL: CPT

## 2022-02-22 PROCEDURE — 80061 LIPID PANEL: CPT

## 2022-02-22 PROCEDURE — 82306 VITAMIN D 25 HYDROXY: CPT

## 2022-02-22 PROCEDURE — 82746 ASSAY OF FOLIC ACID SERUM: CPT

## 2022-02-22 PROCEDURE — 36415 COLL VENOUS BLD VENIPUNCTURE: CPT

## 2022-02-22 PROCEDURE — 82607 VITAMIN B-12: CPT

## 2022-02-23 DIAGNOSIS — I10 ESSENTIAL HYPERTENSION: ICD-10-CM

## 2022-02-23 DIAGNOSIS — E55.9 VITAMIN D DEFICIENCY: ICD-10-CM

## 2022-02-23 DIAGNOSIS — J44.89 COPD (CHRONIC OBSTRUCTIVE PULMONARY DISEASE) WITH CHRONIC BRONCHITIS (HCC): ICD-10-CM

## 2022-02-23 DIAGNOSIS — E78.5 DYSLIPIDEMIA: ICD-10-CM

## 2022-02-23 RX ORDER — ERGOCALCIFEROL 1.25 MG/1
50000 CAPSULE ORAL
Qty: 10 CAPSULE | Refills: 1 | Status: SHIPPED | OUTPATIENT
Start: 2022-02-23 | End: 2022-09-19

## 2022-03-17 ENCOUNTER — OFFICE VISIT (OUTPATIENT)
Dept: SLEEP MEDICINE | Facility: MEDICAL CENTER | Age: 62
End: 2022-03-17
Payer: COMMERCIAL

## 2022-03-17 ENCOUNTER — NON-PROVIDER VISIT (OUTPATIENT)
Dept: SLEEP MEDICINE | Facility: MEDICAL CENTER | Age: 62
End: 2022-03-17
Attending: INTERNAL MEDICINE
Payer: COMMERCIAL

## 2022-03-17 VITALS
SYSTOLIC BLOOD PRESSURE: 132 MMHG | DIASTOLIC BLOOD PRESSURE: 82 MMHG | RESPIRATION RATE: 18 BRPM | OXYGEN SATURATION: 95 % | HEART RATE: 75 BPM | WEIGHT: 186 LBS | BODY MASS INDEX: 35.73 KG/M2

## 2022-03-17 VITALS — BODY MASS INDEX: 35.12 KG/M2 | HEIGHT: 61 IN | WEIGHT: 186 LBS

## 2022-03-17 DIAGNOSIS — Z72.0 TOBACCO USE: ICD-10-CM

## 2022-03-17 DIAGNOSIS — R91.8 LUNG NODULES: ICD-10-CM

## 2022-03-17 DIAGNOSIS — J44.9 CHRONIC OBSTRUCTIVE PULMONARY DISEASE, UNSPECIFIED COPD TYPE (HCC): ICD-10-CM

## 2022-03-17 DIAGNOSIS — J96.11 CHRONIC RESPIRATORY FAILURE WITH HYPOXIA (HCC): ICD-10-CM

## 2022-03-17 PROCEDURE — 94729 DIFFUSING CAPACITY: CPT | Performed by: INTERNAL MEDICINE

## 2022-03-17 PROCEDURE — 94060 EVALUATION OF WHEEZING: CPT | Performed by: INTERNAL MEDICINE

## 2022-03-17 PROCEDURE — 99214 OFFICE O/P EST MOD 30 MIN: CPT | Mod: 25 | Performed by: INTERNAL MEDICINE

## 2022-03-17 PROCEDURE — 94726 PLETHYSMOGRAPHY LUNG VOLUMES: CPT | Performed by: INTERNAL MEDICINE

## 2022-03-17 RX ORDER — IPRATROPIUM BROMIDE AND ALBUTEROL SULFATE 2.5; .5 MG/3ML; MG/3ML
3 SOLUTION RESPIRATORY (INHALATION) EVERY 4 HOURS PRN
Qty: 120 ML | Refills: 11 | Status: SHIPPED | OUTPATIENT
Start: 2022-03-17 | End: 2023-09-20 | Stop reason: SDUPTHER

## 2022-03-17 RX ORDER — VARENICLINE TARTRATE 1 MG/1
TABLET, FILM COATED ORAL
Qty: 60 TABLET | Refills: 3 | Status: SHIPPED | OUTPATIENT
Start: 2022-03-17 | End: 2022-03-23 | Stop reason: SDUPTHER

## 2022-03-17 ASSESSMENT — ENCOUNTER SYMPTOMS
DEPRESSION: 0
MYALGIAS: 0
EYE REDNESS: 0
BACK PAIN: 0
SORE THROAT: 0
DIAPHORESIS: 0
WEIGHT LOSS: 0
HEMOPTYSIS: 0
TREMORS: 0
SPUTUM PRODUCTION: 1
CONSTIPATION: 0
SPEECH CHANGE: 0
DIZZINESS: 0
STRIDOR: 0
EYE DISCHARGE: 0
WHEEZING: 0
HEADACHES: 0
FOCAL WEAKNESS: 0
PHOTOPHOBIA: 0
NECK PAIN: 0
CLAUDICATION: 0
EYE PAIN: 0
SINUS PAIN: 0
DOUBLE VISION: 0
BLURRED VISION: 0
SHORTNESS OF BREATH: 1
HEARTBURN: 0
ABDOMINAL PAIN: 0
COUGH: 1
DIARRHEA: 0
PALPITATIONS: 0
NAUSEA: 0
FEVER: 0
FALLS: 0
CHILLS: 0
ORTHOPNEA: 0
VOMITING: 0
WEAKNESS: 0
PND: 0

## 2022-03-17 ASSESSMENT — PULMONARY FUNCTION TESTS
FEV1/FVC: 40
FEV1: 0.74
FEV1/FVC_PERCENT_CHANGE: -14
FEV1_PERCENT_PREDICTED: 35
FEV1: 0.84
FVC_PERCENT_PREDICTED: 69
FEV1/FVC_PERCENT_LLN: 66
FEV1/FVC_PERCENT_CHANGE: 38
FVC_LLN: 2.51
FEV1_PERCENT_PREDICTED: 31
FEV1_PREDICTED: 2.37
FEV1/FVC_PERCENT_PREDICTED: 51
FEV1/FVC_PREDICTED: 80
FEV1/FVC: 47
FEV1/FVC_PERCENT_PREDICTED: 50
FEV1_PERCENT_CHANGE: 32
FEV1_LLN: 1.98
FVC: 1.59
FEV1/FVC_PERCENT_PREDICTED: 79
FEV1/FVC_PERCENT_PREDICTED: 58
FEV1/FVC: 40
FVC_PREDICTED: 3
FEV1/FVC: 47
FVC: 2.1
FEV1_PERCENT_CHANGE: 12
FEV1/FVC_PERCENT_PREDICTED: 60
FVC_PERCENT_PREDICTED: 52

## 2022-03-17 ASSESSMENT — FIBROSIS 4 INDEX
FIB4 SCORE: 1.5
FIB4 SCORE: 1.5

## 2022-03-17 NOTE — PROGRESS NOTES
Chief Complaint   Patient presents with   • COPD     LAST SEEN 10/13/21          HPI: This patient is a 61 y.o. female whom is followed in our clinic for COPD c/b chronic hypoxic respiratory failure last seen by me on 10/13/21 to establish care. Pt PMHx is significant for HLP, HTN and COPD previously followed at [facility] 6 years ago. She is an active smoker, 3/4 ppd and has smoked up to 1 ppd for the past 40 years.  She is on advair and spiriva and prn PAULETTE via [facility]. Last exacerbation in late 2019 in [place]. She denies difficulty with airway clearance. She was hypoxic on arrival at our last visit and was prescribed O2 with activity but has not been able to schedule tank delivery with current DME company and would like to switch companies.  She has been required steroids since her last clinic visit with us and uses her rescue inhaler infrequently.  She is mMRC functional class III and pulmonary function testing today shows severe airflow obstruction with baseline FEV1 of 31% predicted or 0.24 L.  She does have bronchodilator response, mild air trapping and reduced DLCO 60% predicted.  She had a lung cancer screening CT done in June which demonstrated emphysema, left upper lobe granuloma and 2 mm nodule a CT done in October prior to our visit showed a 5 mm right upper lobe groundglass opacity which was new.  We plan to repeat CT in January but this was not scheduled.  She has no acute complaints today.  She has been using Chantix since her last clinic visit and is not tobacco free but has been able to cut back significantly.    Past Medical History:   Diagnosis Date   • COPD (chronic obstructive pulmonary disease) (HCC)    • Essential hypertension 5/19/2021   • GILL (generalized anxiety disorder) 5/19/2021   • GERD (gastroesophageal reflux disease)    • Mild episode of recurrent major depressive disorder (HCC) 5/19/2021   • Mixed dyslipidemia 6/2/2021   • Tobacco dependence due to cigarettes        Social History      Socioeconomic History   • Marital status:      Spouse name: Not on file   • Number of children: Not on file   • Years of education: Not on file   • Highest education level: Not on file   Occupational History   • Not on file   Tobacco Use   • Smoking status: Current Some Day Smoker     Packs/day: 1.00     Years: 40.00     Pack years: 40.00     Types: Cigarettes   • Smokeless tobacco: Never Used   Vaping Use   • Vaping Use: Never used   Substance and Sexual Activity   • Alcohol use: Yes     Alcohol/week: 12.6 - 14.4 oz     Types: 21 - 24 Cans of beer per week     Comment: 4 beers per day   • Drug use: Not Currently   • Sexual activity: Yes     Partners: Male   Other Topics Concern   • Not on file   Social History Narrative   • Not on file     Social Determinants of Health     Financial Resource Strain: Not on file   Food Insecurity: Not on file   Transportation Needs: Not on file   Physical Activity: Not on file   Stress: Not on file   Social Connections: Not on file   Intimate Partner Violence: Not on file   Housing Stability: Not on file       Family History   Problem Relation Age of Onset   • Kidney Disease Mother    • Heart Disease Mother    • Stroke Mother    • Diabetes Mother    • Diabetes Father    • Kidney Disease Father    • Arrythmia Father        Current Outpatient Medications on File Prior to Visit   Medication Sig Dispense Refill   • ergocalciferol (DRISDOL) 73955 UNIT capsule Take 1 Capsule by mouth every 7 days. 10 Capsule 1   • atorvastatin (LIPITOR) 20 MG Tab Take 1 Tablet by mouth every evening. 90 Tablet 3   • clotrimazole-betamethasone (LOTRISONE) 1-0.05 % Cream APPLY THIN FILM TO RASH UP TO TWICE A DAY ONLY IF NEEDED. (Patient not taking: Reported on 2/15/2022) 45 g 2   • amLODIPine (NORVASC) 5 MG Tab Take 1 Tablet by mouth every day. 90 Tablet 3   • escitalopram (LEXAPRO) 20 MG tablet Take 1 Tablet by mouth every day. 90 Tablet 3   • lisinopril (PRINIVIL) 10 MG Tab Take 1 tablet by  mouth every day. 90 tablet 3   • fluticasone-salmeterol (ADVAIR HFA) 230-21 MCG/ACT inhaler Inhale 2 Puffs 2 times a day. 12 g 11   • tiotropium (SPIRIVA RESPIMAT) 2.5 mcg/Act Aero Soln Inhale 2 Inhalation every day. 4 g 11   • albuterol (PROAIR HFA) 108 (90 Base) MCG/ACT Aero Soln inhalation aerosol Inhale 2 Puffs every 6 hours as needed for Shortness of Breath. 18 g 6   • hydrOXYzine HCl (ATARAX) 25 MG Tab Take 1 tablet by mouth 3 times a day as needed for Anxiety. 30 tablet 0     No current facility-administered medications on file prior to visit.       Penicillins      ROS:   Review of Systems   Constitutional: Negative for chills, diaphoresis, fever, malaise/fatigue and weight loss.   HENT: Negative for congestion, ear discharge, ear pain, hearing loss, nosebleeds, sinus pain, sore throat and tinnitus.    Eyes: Negative for blurred vision, double vision, photophobia, pain, discharge and redness.   Respiratory: Positive for cough, sputum production and shortness of breath. Negative for hemoptysis, wheezing and stridor.    Cardiovascular: Negative for chest pain, palpitations, orthopnea, claudication, leg swelling and PND.   Gastrointestinal: Negative for abdominal pain, constipation, diarrhea, heartburn, nausea and vomiting.   Genitourinary: Negative for dysuria and urgency.   Musculoskeletal: Negative for back pain, falls, joint pain, myalgias and neck pain.   Skin: Negative for itching and rash.   Neurological: Negative for dizziness, tremors, speech change, focal weakness, weakness and headaches.   Endo/Heme/Allergies: Negative for environmental allergies.   Psychiatric/Behavioral: Negative for depression.       /82 (BP Location: Right arm, Patient Position: Sitting, BP Cuff Size: Adult)   Pulse 75   SpO2 95%   Physical Exam  Vitals reviewed.   Constitutional:       General: She is not in acute distress.     Appearance: Normal appearance. She is well-developed and normal weight.   HENT:      Head:  Normocephalic and atraumatic.      Right Ear: External ear normal.      Left Ear: External ear normal.      Nose: Nose normal. No congestion.      Mouth/Throat:      Mouth: Mucous membranes are moist.      Pharynx: Oropharynx is clear. No oropharyngeal exudate.   Eyes:      General: No scleral icterus.     Extraocular Movements: Extraocular movements intact.      Conjunctiva/sclera: Conjunctivae normal.      Pupils: Pupils are equal, round, and reactive to light.   Neck:      Vascular: No JVD.      Trachea: No tracheal deviation.   Cardiovascular:      Rate and Rhythm: Normal rate and regular rhythm.      Heart sounds: Normal heart sounds. No murmur heard.    No friction rub. No gallop.   Pulmonary:      Effort: Pulmonary effort is normal. No accessory muscle usage or respiratory distress.      Breath sounds: No wheezing or rales.      Comments: Decreased air movement throughout  Abdominal:      General: There is no distension.      Palpations: Abdomen is soft.      Tenderness: There is no abdominal tenderness.   Musculoskeletal:         General: No tenderness or deformity. Normal range of motion.      Cervical back: Normal range of motion and neck supple.      Right lower leg: No edema.      Left lower leg: No edema.   Lymphadenopathy:      Cervical: No cervical adenopathy.   Skin:     General: Skin is warm and dry.      Findings: No rash.      Nails: There is no clubbing.   Neurological:      Mental Status: She is alert and oriented to person, place, and time.      Cranial Nerves: No cranial nerve deficit.      Gait: Gait normal.   Psychiatric:         Mood and Affect: Mood normal.         Behavior: Behavior normal.         PFTs as reviewed by me personally: as per hPI    Imagaing as reviewed by me personally:  As per hPI    Assessment:  1. Chronic obstructive pulmonary disease, unspecified COPD type (HCC)  DME Nebulizer    ipratropium-albuterol (DUONEB) 0.5-2.5 (3) MG/3ML nebulizer solution    Referral to  Pulmonary Rehab    Multiple Oximetry    DME O2 New Set Up   2. Chronic respiratory failure with hypoxia (HCC)  Referral to Pulmonary Rehab    Multiple Oximetry    DME O2 New Set Up   3. Tobacco use     4. Lung nodules  Multiple Oximetry    DME O2 New Set Up       PlReturn in about 6 months (around 9/17/2022) for COPD .

## 2022-03-17 NOTE — PROCEDURES
Technician: Alicia Amaya RRT, CPFT  Good patient effort & cooperation.  The results of this test meet the ATS/ERS standards for acceptability & reproducibility.  Test was performed on the Magic Leap Body Plethysmograph-Elite DX system.  Predicted equations for Spirometry are GLI-2012, ITS for lung volumes, and GLI-2017 for DLCO.  The DLCO was uncorrected for Hgb.  A bronchodilator of Ventolin HFA -2puffs via spacer administered.  DLCO performed during dilation period. IVC is less than 90%    Interpretation;   Baseline spirometry shows airflow obstruction with FEV1/FVC ratio of 47 and FEV1 of 0.74 L or 31% predicted.  There is significant bronchodilator response with postbronchodilator FEV1 of 0.84 L or 35% predicted.  Total lung capacity is within normal limits however there is mild air trapping with residual volume of 162% predicted.  Diffusion capacity reduced at 60% predicted.  Pulmonary function testing shows airflow obstruction with air trapping and reduced DLCO consistent with advanced obstructive lung disease.

## 2022-03-18 NOTE — NON-PROVIDER
"Per pt and spouse, [facility] have given them different information multiple times. Pt was informed by DME order received, then order not received. States they spoke to a male at [facility] and was informed they will have oxygen delivered. Pt has not received any oxygen from [facility] since order being placed and sent since 10/13/21. No day time oxygen provided. Pt confirmed to having a concentrator at home, has had same concentrator for over 7 years. DME has not service machine.       I spoke to Breanna at [facility], states patient's account is currently on \"hold\" [facility] patient has an over due balance and will not receive any equipment from [facility]. I suggested patient contact insurance and confirm billing and inform of issues with DME. New order with supporting testing and notes faxed to [facility].   "

## 2022-03-18 NOTE — PROCEDURES
Multi-Ox Readings  Multi Ox #1 Room air   O2 sat % at rest 95   O2 sat % on exertion 85   O2 sat average on exertion     Multi Ox #2 2 LPM   O2 sat % at rest 99   O2 sat % on exertion 97   O2 sat average on exertion       Oxygen Use 2   Oxygen Frequency 24/7   Duration of need     Is the patient mobile within the home?     CPAP Use?     BIPAP Use?     Servo Titration

## 2022-03-23 RX ORDER — VARENICLINE TARTRATE 1 MG/1
TABLET, FILM COATED ORAL
Qty: 60 TABLET | Refills: 3 | Status: SHIPPED | OUTPATIENT
Start: 2022-03-23 | End: 2022-05-16 | Stop reason: SDUPTHER

## 2022-03-23 NOTE — PROGRESS NOTES
Please update RX varenicline Chantix 1mg. Hope from [facility], needs proper directions please advise.

## 2022-04-01 ENCOUNTER — TELEPHONE (OUTPATIENT)
Dept: SLEEP MEDICINE | Facility: MEDICAL CENTER | Age: 62
End: 2022-04-01
Payer: COMMERCIAL

## 2022-04-01 DIAGNOSIS — J44.9 CHRONIC OBSTRUCTIVE PULMONARY DISEASE, UNSPECIFIED COPD TYPE (HCC): ICD-10-CM

## 2022-04-01 NOTE — TELEPHONE ENCOUNTER
Oxygen set up was sent to [facility]. I spoke to the patient and confirm patient received oxygen from [facility] and is no longer with /[facility]. Per pt [facility] needs to complete testing to provide small tanks.     I spoke to Contreras at [facility]. They do need an order for titration to provided OCD tanks. Order pending please sign.

## 2022-04-01 NOTE — TELEPHONE ENCOUNTER
VOICEMAIL  1. Caller Name: [facility]                       Call Back Number: [phone]    2. Message: [facility] called stating they need o2 order for patient.     3. Patient approves office to leave a detailed voicemail/MyChart message: yes

## 2022-04-01 NOTE — TELEPHONE ENCOUNTER
Order faxed along with nebulizer order, supporting records to [facility]. I left  to the patient encouraged she call and confirm orders received with [facility]. If any issues to reach out to us.

## 2022-05-16 ENCOUNTER — OFFICE VISIT (OUTPATIENT)
Dept: MEDICAL GROUP | Facility: PHYSICIAN GROUP | Age: 62
End: 2022-05-16
Payer: COMMERCIAL

## 2022-05-16 ENCOUNTER — TELEPHONE (OUTPATIENT)
Dept: URGENT CARE | Facility: PHYSICIAN GROUP | Age: 62
End: 2022-05-16

## 2022-05-16 ENCOUNTER — HOSPITAL ENCOUNTER (OUTPATIENT)
Facility: MEDICAL CENTER | Age: 62
End: 2022-05-16
Attending: NURSE PRACTITIONER
Payer: COMMERCIAL

## 2022-05-16 VITALS
BODY MASS INDEX: 33.68 KG/M2 | OXYGEN SATURATION: 94 % | HEIGHT: 62 IN | WEIGHT: 183 LBS | SYSTOLIC BLOOD PRESSURE: 142 MMHG | TEMPERATURE: 97.3 F | DIASTOLIC BLOOD PRESSURE: 86 MMHG | HEART RATE: 98 BPM | RESPIRATION RATE: 18 BRPM

## 2022-05-16 DIAGNOSIS — Z78.0 POSTMENOPAUSAL: ICD-10-CM

## 2022-05-16 DIAGNOSIS — Z01.419 WELL WOMAN EXAM: ICD-10-CM

## 2022-05-16 DIAGNOSIS — Z23 NEED FOR VACCINATION: ICD-10-CM

## 2022-05-16 DIAGNOSIS — F17.210 TOBACCO DEPENDENCE DUE TO CIGARETTES: ICD-10-CM

## 2022-05-16 PROCEDURE — 87624 HPV HI-RISK TYP POOLED RSLT: CPT

## 2022-05-16 PROCEDURE — 90471 IMMUNIZATION ADMIN: CPT | Performed by: NURSE PRACTITIONER

## 2022-05-16 PROCEDURE — 99396 PREV VISIT EST AGE 40-64: CPT | Mod: 25 | Performed by: NURSE PRACTITIONER

## 2022-05-16 PROCEDURE — 88175 CYTOPATH C/V AUTO FLUID REDO: CPT

## 2022-05-16 PROCEDURE — 90677 PCV20 VACCINE IM: CPT | Performed by: NURSE PRACTITIONER

## 2022-05-16 RX ORDER — VARENICLINE TARTRATE 1 MG/1
TABLET, FILM COATED ORAL
Qty: 60 TABLET | Refills: 3 | Status: SHIPPED | OUTPATIENT
Start: 2022-05-16 | End: 2022-09-19 | Stop reason: SDUPTHER

## 2022-05-16 ASSESSMENT — FIBROSIS 4 INDEX: FIB4 SCORE: 1.5

## 2022-05-16 NOTE — PROGRESS NOTES
SUBJECTIVE: 61 y.o. female for annual routine gynecologic exam  Chief Complaint   Patient presents with   • Gynecologic Exam     PAP   • Medication Refill     Chantix       OB History    Para Term  AB Living   2 2 0 0 0 0   SAB IAB Ectopic Molar Multiple Live Births   0 0 0 0 0 0        Social History     Substance and Sexual Activity   Sexual Activity Yes   • Partners: Male     Sexual history: currently sexually active, single partner, previous pregnancy   H/O Abnormal Pap no  She  reports that she has been smoking cigarettes. She has a 40.00 pack-year smoking history. She has never used smokeless tobacco.        Allergies: Penicillins     ROS:    Last Pap: about 15 yeas   Reports none menopause symptoms of hot flashes, night sweats, sleep disruption, mood changes.  Patient reports that she is postmenopausal.    No significant bloating/fluid retention, pelvic pain, or dyspareunia. No vaginal discharge   No breast tenderness, mass, nipple discharge, changes in size or contour, or abnormal cyclic discomfort.  No urinary tract symptoms, no incontinence, no polydipsia, polyuria,  No abdominal pain, change in bowel habits, black or bloody stools.    No unusual headaches, no visual changes, menstrual migraines   No prolonged cough. No dyspnea or chest pain on exertion.  No depression, labile mood, anxiety, libido changes, insomnia.  No temperature intolerance.  No new/concerning skin lesions, concerns.     Exercise: sporadic irregular exercise    Current medicines (including changes today)  Current Outpatient Medications   Medication Sig Dispense Refill   • varenicline (CHANTIX) 1 MG tablet One tablet daily as maintainence 60 Tablet 3   • ipratropium-albuterol (DUONEB) 0.5-2.5 (3) MG/3ML nebulizer solution Take 3 mL by nebulization every four hours as needed for Shortness of Breath. 120 mL 11   • ergocalciferol (DRISDOL) 24793 UNIT capsule Take 1 Capsule by mouth every 7 days. 10 Capsule 1   • atorvastatin  "(LIPITOR) 20 MG Tab Take 1 Tablet by mouth every evening. 90 Tablet 3   • amLODIPine (NORVASC) 5 MG Tab Take 1 Tablet by mouth every day. 90 Tablet 3   • escitalopram (LEXAPRO) 20 MG tablet Take 1 Tablet by mouth every day. 90 Tablet 3   • lisinopril (PRINIVIL) 10 MG Tab Take 1 tablet by mouth every day. 90 tablet 3   • fluticasone-salmeterol (ADVAIR HFA) 230-21 MCG/ACT inhaler Inhale 2 Puffs 2 times a day. 12 g 11   • tiotropium (SPIRIVA RESPIMAT) 2.5 mcg/Act Aero Soln Inhale 2 Inhalation every day. 4 g 11   • albuterol (PROAIR HFA) 108 (90 Base) MCG/ACT Aero Soln inhalation aerosol Inhale 2 Puffs every 6 hours as needed for Shortness of Breath. 18 g 6   • hydrOXYzine HCl (ATARAX) 25 MG Tab Take 1 tablet by mouth 3 times a day as needed for Anxiety. 30 tablet 0   • clotrimazole-betamethasone (LOTRISONE) 1-0.05 % Cream APPLY THIN FILM TO RASH UP TO TWICE A DAY ONLY IF NEEDED. 45 g 2     No current facility-administered medications for this visit.     She  has a past medical history of COPD (chronic obstructive pulmonary disease) ([facility]), Essential hypertension (5/19/2021), GILL (generalized anxiety disorder) (5/19/2021), GERD (gastroesophageal reflux disease), Mild episode of recurrent major depressive disorder (HCC) (5/19/2021), Mixed dyslipidemia (6/2/2021), and Tobacco dependence due to cigarettes.    She has no past medical history of Anemia, Arthritis, Asthma, Cancer ([facility]), Diabetes ([facility]), Heart attack ([facility]), Heart murmur, HIV (human immunodeficiency virus infection) ([facility]), Kidney disease, Seizure ([facility]), or Stroke ([facility]).  She  has no past surgical history on file.     OBJECTIVE:   BP (!) 142/86   Pulse 98   Temp 36.3 °C (97.3 °F) (Temporal)   Resp 18   Ht 1.575 m (5' 2\")   Wt 83 kg (183 lb)   SpO2 94%   BMI 33.47 kg/m²   Body mass index is 33.47 kg/m².    CHEST: Diminished to auscultation throughout no wheezes or rales.   HEART:  Regular rate and rhythm.  S1 and S2 normal.   No edema   ABDOMEN:  Soft " without tenderness, guarding, mass or organomegaly..   SKIN: color normal, vascularity normal, no edema, temperature normal  No rashes or suspicious skin lesions noted.     Pelvic Exam -  Normal external genitalia with no lesions. Vaginal Mucosa:  normal vaginal mucosa . Cervix with no visible lesions. No cervical motion tenderness. Uterus is normal sized with no masses. No adnexal tenderness or enlargement appreciated. Thin Prep Pap is obtained, vaginal swab is not obtained and specimen(s) sent to lab  Rectal: no mass    <ASSESSMENT and PLAN>  Blood pressure is elevated today in clinic however patient reports that she was nervous for her exam.  She is currently on antihypertension medication and oxygen due to chronic COPD.  No current changes in her medication or treatment plan at this time.  Follow-up in 1 to 2 weeks for MA blood pressure recheck.  Blood pressure remains elevated patient should have an in office visit for evaluation.    DEXA scan order was ordered today.    Highly encourage patient to follow-up with completing her mammogram.    1. Well woman exam  - Thinprep Pap with HPV; Future    2. Tobacco dependence due to cigarettes  Encourage patient to continue working on quitting smoking.  Patient is currently on 24/7 oxygen.  - varenicline (CHANTIX) 1 MG tablet; One tablet daily as maintainence  Dispense: 60 Tablet; Refill: 3    3. Postmenopausal  - DS-BONE DENSITY STUDY (DEXA); Future    4. Need for vaccination  - Pneumococcal Conjugate Vaccine 20-Valent (19 yrs+)       Discussed  mammography screening, feminine hygiene, menopause, osteoporosis, adequate intake of calcium and vitamin D, diet and exercise, Kegel exercises   Follow-up  5 years for next Pap-Pap is normal.  Next office visit for recheck of chronic medical conditions is due in 3 months

## 2022-05-16 NOTE — ASSESSMENT & PLAN NOTE
Chronic and ongoing health concern.  Patient does continue to smoke cigarettes.  She is aware the risks associated in doing so.  She is currently taking venlafaxine 1 mg tablet daily.  She is requesting a refill of this medication I do feel is appropriate refill sent to pharmacy.

## 2022-05-16 NOTE — TELEPHONE ENCOUNTER
Pharmacy called wanting to change the medication refill for chantix to bid instead. Spoke with provider and she stated stick with original order.

## 2022-05-17 DIAGNOSIS — Z01.419 WELL WOMAN EXAM: ICD-10-CM

## 2022-05-18 LAB
CYTOLOGY REG CYTOL: NORMAL
HPV HR 12 DNA CVX QL NAA+PROBE: NEGATIVE
HPV16 DNA SPEC QL NAA+PROBE: NEGATIVE
HPV18 DNA SPEC QL NAA+PROBE: NEGATIVE
SPECIMEN SOURCE: NORMAL

## 2022-05-23 ENCOUNTER — NON-PROVIDER VISIT (OUTPATIENT)
Dept: MEDICAL GROUP | Facility: PHYSICIAN GROUP | Age: 62
End: 2022-05-23
Payer: COMMERCIAL

## 2022-05-23 DIAGNOSIS — I10 ESSENTIAL HYPERTENSION: ICD-10-CM

## 2022-05-23 RX ORDER — LISINOPRIL AND HYDROCHLOROTHIAZIDE 12.5; 1 MG/1; MG/1
1 TABLET ORAL DAILY
Qty: 90 TABLET | Refills: 1 | Status: SHIPPED | OUTPATIENT
Start: 2022-05-23 | End: 2023-02-24 | Stop reason: SDUPTHER

## 2022-05-23 NOTE — PROGRESS NOTES
Patient is here for MA visit with elevated blood pressure reading in right arm normal pressure reading left arm.  Patient was agreeable for trial of lisinopril/hydrochlorothiazide.  Side effects medication were discussed.  New prescription was sent to pharmacy.  Patient will discontinue off of lisinopril 10 mg tablet and start lisinopril/hydrochlorothiazide.  Follow-up in clinic in 4 weeks.

## 2022-05-24 VITALS — SYSTOLIC BLOOD PRESSURE: 124 MMHG | DIASTOLIC BLOOD PRESSURE: 80 MMHG

## 2022-05-24 NOTE — PROGRESS NOTES
Kristel Sewell is a 61 y.o. female here for a non-provider visit for bp check    Vitals:    05/23/22 0951   BP: 124/80   BP Location: Left arm     If abnormal, was the Registered Nurse (office provider if RN is unavailable) notified today? Yes    Routed to PCP? Yes

## 2022-06-27 ENCOUNTER — OFFICE VISIT (OUTPATIENT)
Dept: MEDICAL GROUP | Facility: PHYSICIAN GROUP | Age: 62
End: 2022-06-27
Payer: COMMERCIAL

## 2022-06-27 VITALS
BODY MASS INDEX: 33.13 KG/M2 | DIASTOLIC BLOOD PRESSURE: 80 MMHG | WEIGHT: 180 LBS | TEMPERATURE: 97.9 F | OXYGEN SATURATION: 100 % | HEART RATE: 90 BPM | HEIGHT: 62 IN | SYSTOLIC BLOOD PRESSURE: 120 MMHG

## 2022-06-27 DIAGNOSIS — F17.210 TOBACCO DEPENDENCE DUE TO CIGARETTES: ICD-10-CM

## 2022-06-27 DIAGNOSIS — Z12.11 COLON CANCER SCREENING: ICD-10-CM

## 2022-06-27 DIAGNOSIS — Z99.81 SUPPLEMENTAL OXYGEN DEPENDENT: ICD-10-CM

## 2022-06-27 DIAGNOSIS — I10 ESSENTIAL HYPERTENSION: ICD-10-CM

## 2022-06-27 DIAGNOSIS — R91.1 LUNG NODULE: ICD-10-CM

## 2022-06-27 PROCEDURE — 99406 BEHAV CHNG SMOKING 3-10 MIN: CPT | Performed by: NURSE PRACTITIONER

## 2022-06-27 PROCEDURE — 99214 OFFICE O/P EST MOD 30 MIN: CPT | Mod: 25 | Performed by: NURSE PRACTITIONER

## 2022-06-27 ASSESSMENT — FIBROSIS 4 INDEX: FIB4 SCORE: 1.5

## 2022-06-27 NOTE — PROGRESS NOTES
Chief Complaint   Patient presents with   • Follow-Up     htn       Subjective:     HPI:   Kristel Sewell is a 61 y.o. female here to discuss the evaluation and management of:      Tobacco dependence due to cigarettes  Only smoking 3 per day. Continues of venlafaxine 1 mg daily.   No quit date set.   On O2 supplement puff system PRN.     Essential hypertension  Chronic and well-controlled condition.  Blood pressure in right arm 140/90 with repeat blood pressure 140/90, blood pressure left arm 120/80.  Patient is currently on amlodipine 5 mg daily, lisinopril/hydrochlorothiazide 10-12 0.5 mg.  She reports he is tolerating his medications well without any adverse effects.    Patient denies any headaches, chest pain, dizziness, palpitations, or vision changes.    She does continue to smoke cigarettes.  Please see additional notes in tobacco abuse.    Lung nodule  Previous CT/CTA on October 12, 2022 indicated small irregular nodule pregnancy within the posterior right upper lobe and recommended repeat noncontrast chest CT in 3 to 6 months.  Order was placed by pulmonology however patient has not followed up.  She is currently asymptomatic and denies any change in her chronic shortness of breath, unintentional weight loss, or fatigue.    Supplemental oxygen dependent  Chronic condition due to COPD and chronic tobacco abuse.  Currently on 3 L on demand puff oxygen.  She reports that she is feeling much better since being placed on oxygen therapy.        ROS:  Gen: no fevers/chills, no changes in weight  Pulm: Chronic shortness of breath and cough per HPI.  CV: no chest pain, no palpitations  GI: no nausea/vomiting, no diarrhea  MSk: no myalgias  Neuro: no headaches, no numbness/tingling      Allergies   Allergen Reactions   • Penicillins        Current medicines (including changes today)  Current Outpatient Medications   Medication Sig Dispense Refill   • lisinopril-hydrochlorothiazide (PRINZIDE) 10-12.5 MG per  "tablet Take 1 Tablet by mouth every day. 90 Tablet 1   • varenicline (CHANTIX) 1 MG tablet One tablet daily as maintainence 60 Tablet 3   • ipratropium-albuterol (DUONEB) 0.5-2.5 (3) MG/3ML nebulizer solution Take 3 mL by nebulization every four hours as needed for Shortness of Breath. 120 mL 11   • atorvastatin (LIPITOR) 20 MG Tab Take 1 Tablet by mouth every evening. 90 Tablet 3   • amLODIPine (NORVASC) 5 MG Tab Take 1 Tablet by mouth every day. 90 Tablet 3   • escitalopram (LEXAPRO) 20 MG tablet Take 1 Tablet by mouth every day. 90 Tablet 3   • fluticasone-salmeterol (ADVAIR HFA) 230-21 MCG/ACT inhaler Inhale 2 Puffs 2 times a day. 12 g 11   • tiotropium (SPIRIVA RESPIMAT) 2.5 mcg/Act Aero Soln Inhale 2 Inhalation every day. 4 g 11   • albuterol (PROAIR HFA) 108 (90 Base) MCG/ACT Aero Soln inhalation aerosol Inhale 2 Puffs every 6 hours as needed for Shortness of Breath. 18 g 6   • hydrOXYzine HCl (ATARAX) 25 MG Tab Take 1 tablet by mouth 3 times a day as needed for Anxiety. 30 tablet 0   • ergocalciferol (DRISDOL) 50724 UNIT capsule Take 1 Capsule by mouth every 7 days. 10 Capsule 1     No current facility-administered medications for this visit.          Objective:       /80 (BP Location: Left arm)   Pulse 90   Temp 36.6 °C (97.9 °F)   Ht 1.575 m (5' 2\")   Wt 81.6 kg (180 lb)   SpO2 100%  Body mass index is 32.92 kg/m².    Physical Exam:  Constitutional: Well-developed and well-nourished female in NAD. Not diaphoretic. No distress.   Skin: warm, dry, intact  Cardiovascular: Regular rate and rhythm without murmur. Radial pulses are intact and equal bilaterally.  Pulmonary: Diminished to ausculation. Normal effort. No rales, ronchi, or wheezing.  Abdomen: Soft, non tender, and without distention. Active bowel sounds in all four quadrants.   Extremities: No cyanosis, clubbing, erythema, nor edema.   Neurological: Alert and oriented x 3.   Psychiatric:  Behavior, mood, and affect are " appropriate.      Assessment and Plan:     The following treatment plan was discussed:      1. Tobacco dependence due to cigarettes  Chronic and ongoing health concern.  Encourage patient to set quit date.  She will continue with Chantix 1 mg daily.   was given regards to smoking cessation as well as health risk associated with long-term smoking for 4 minutes during our exam.    2. Lung nodule  Chronic ongoing health concern.  Strongly encourage patient to get follow-up CT completed.  She reports she has mammogram scheduled for July 12 and encouraged her to try to schedule CT scanning during that visit.  Patient was agreeable to this.    3. Essential hypertension  Chronic well-controlled.  Patient will continue on amlodipine 5 mg daily and lisinopril/hydrochlorothiazide 10/12.5 daily.  Patient has chronic asymmetry in blood pressure readings due to Severe proximal left subclavian artery stenosis.   We will continue to monitor at future appointments.    4.  Supplement oxygen dependent  Chronic and stable.  No refills needed at this time.  We will continue to monitor.  Patient was counseled to quit smoking.    5. Colon cancer screening  - COLOGUARD (FIT DNA)      Any change or worsening of signs or symptoms, patient encouraged to follow-up or report to urgent care or emergency room for further evaluation. Patient verbalizes understanding and agrees.    Follow-Up: Return in about 7 weeks (around 8/15/2022) for Follow up labs, HTN.      PLEASE NOTE: This dictation was created using voice recognition software. I have made every reasonable attempt to correct obvious errors, but I expect that there are errors of grammar and possibly content that I did not discover before finalizing the note.

## 2022-06-27 NOTE — ASSESSMENT & PLAN NOTE
Chronic and well-controlled condition.  Blood pressure in right arm 140/90 with repeat blood pressure 140/90, blood pressure left arm 120/80.  Patient is currently on amlodipine 5 mg daily, lisinopril/hydrochlorothiazide 10-12 0.5 mg.  She reports he is tolerating his medications well without any adverse effects.    Patient denies any headaches, chest pain, dizziness, palpitations, or vision changes.    She does continue to smoke cigarettes.  Please see additional notes in tobacco abuse.

## 2022-06-27 NOTE — ASSESSMENT & PLAN NOTE
Chronic condition due to COPD and chronic tobacco abuse.  Currently on 3 L on demand puff oxygen.  She reports that she is feeling much better since being placed on oxygen therapy.

## 2022-06-27 NOTE — ASSESSMENT & PLAN NOTE
Previous CT/CTA on October 12, 2022 indicated small irregular nodule pregnancy within the posterior right upper lobe and recommended repeat noncontrast chest CT in 3 to 6 months.  Order was placed by pulmonology however patient has not followed up.  She is currently asymptomatic and denies any change in her chronic shortness of breath, unintentional weight loss, or fatigue.

## 2022-06-27 NOTE — PATIENT INSTRUCTIONS
"Smoking Cessation, Tips for Success  If you are ready to quit smoking, congratulations! You have chosen to help yourself be healthier. Cigarettes bring nicotine, tar, carbon monoxide, and other irritants into your body. Your lungs, heart, and blood vessels will be able to work better without these poisons. There are many different ways to quit smoking. Nicotine gum, nicotine patches, a nicotine inhaler, or nicotine nasal spray can help with physical craving. Hypnosis, support groups, and medicines help break the habit of smoking.  WHAT THINGS CAN I DO TO MAKE QUITTING EASIER?   Here are some tips to help you quit for good:  Pick a date when you will quit smoking completely. Tell all of your friends and family about your plan to quit on that date.  Do not try to slowly cut down on the number of cigarettes you are smoking. Pick a quit date and quit smoking completely starting on that day.  Throw away all cigarettes.    Clean and remove all ashtrays from your home, work, and car.  On a card, write down your reasons for quitting. Carry the card with you and read it when you get the urge to smoke.  Cleanse your body of nicotine. Drink enough water and fluids to keep your urine clear or pale yellow. Do this after quitting to flush the nicotine from your body.  Learn to predict your moods. Do not let a bad situation be your excuse to have a cigarette. Some situations in your life might tempt you into wanting a cigarette.  Never have \"just one\" cigarette. It leads to wanting another and another. Remind yourself of your decision to quit.  Change habits associated with smoking. If you smoked while driving or when feeling stressed, try other activities to replace smoking. Stand up when drinking your coffee. Brush your teeth after eating. Sit in a different chair when you read the paper. Avoid alcohol while trying to quit, and try to drink fewer caffeinated beverages. Alcohol and caffeine may urge you to smoke.  Avoid foods and " "drinks that can trigger a desire to smoke, such as sugary or spicy foods and alcohol.  Ask people who smoke not to smoke around you.  Have something planned to do right after eating or having a cup of coffee. For example, plan to take a walk or exercise.  Try a relaxation exercise to calm you down and decrease your stress. Remember, you may be tense and nervous for the first 2 weeks after you quit, but this will pass.  Find new activities to keep your hands busy. Play with a pen, coin, or rubber band. Doodle or draw things on paper.  Brush your teeth right after eating. This will help cut down on the craving for the taste of tobacco after meals. You can also try mouthwash.    Use oral substitutes in place of cigarettes. Try using lemon drops, carrots, cinnamon sticks, or chewing gum. Keep them handy so they are available when you have the urge to smoke.  When you have the urge to smoke, try deep breathing.  Designate your home as a nonsmoking area.  If you are a heavy smoker, ask your health care provider about a prescription for nicotine chewing gum. It can ease your withdrawal from nicotine.  Reward yourself. Set aside the cigarette money you save and buy yourself something nice.  Look for support from others. Join a support group or smoking cessation program. Ask someone at home or at work to help you with your plan to quit smoking.  Always ask yourself, \"Do I need this cigarette or is this just a reflex?\" Tell yourself, \"Today, I choose not to smoke,\" or \"I do not want to smoke.\" You are reminding yourself of your decision to quit.  Do not replace cigarette smoking with electronic cigarettes (commonly called e-cigarettes). The safety of e-cigarettes is unknown, and some may contain harmful chemicals.  If you relapse, do not give up! Plan ahead and think about what you will do the next time you get the urge to smoke.  HOW WILL I FEEL WHEN I QUIT SMOKING?  You may have symptoms of withdrawal because your body is " used to nicotine (the addictive substance in cigarettes). You may crave cigarettes, be irritable, feel very hungry, cough often, get headaches, or have difficulty concentrating. The withdrawal symptoms are only temporary. They are strongest when you first quit but will go away within 10-14 days. When withdrawal symptoms occur, stay in control. Think about your reasons for quitting. Remind yourself that these are signs that your body is healing and getting used to being without cigarettes. Remember that withdrawal symptoms are easier to treat than the major diseases that smoking can cause.   Even after the withdrawal is over, expect periodic urges to smoke. However, these cravings are generally short lived and will go away whether you smoke or not. Do not smoke!  WHAT RESOURCES ARE AVAILABLE TO HELP ME QUIT SMOKING?  Your health care provider can direct you to community resources or hospitals for support, which may include:  Group support.  Education.  Hypnosis.  Therapy.     This information is not intended to replace advice given to you by your health care provider. Make sure you discuss any questions you have with your health care provider.     Document Released: 09/15/2005 Document Revised: 01/08/2016 Document Reviewed: 06/05/2014  [employer] Interactive Patient Education ©2016 [employer]

## 2022-07-12 ENCOUNTER — APPOINTMENT (OUTPATIENT)
Dept: RADIOLOGY | Facility: MEDICAL CENTER | Age: 62
End: 2022-07-12
Attending: NURSE PRACTITIONER
Payer: COMMERCIAL

## 2022-07-21 ENCOUNTER — HOSPITAL ENCOUNTER (OUTPATIENT)
Dept: RADIOLOGY | Facility: MEDICAL CENTER | Age: 62
End: 2022-07-21
Payer: COMMERCIAL

## 2022-08-10 ENCOUNTER — TELEPHONE (OUTPATIENT)
Dept: SLEEP MEDICINE | Facility: MEDICAL CENTER | Age: 62
End: 2022-08-10
Payer: COMMERCIAL

## 2022-08-10 NOTE — TELEPHONE ENCOUNTER
Phone Number Called: June     Call outcome: Spoke to patient regarding message below.    Message: informed I confirmed with Elan what was requested. Notes needed, have been sent. Encourage patient follow up as scheduled.

## 2022-08-10 NOTE — TELEPHONE ENCOUNTER
VOICEMAIL  1. Caller Name: June Acevedo                      Call Back Number: [phone]      2. Message: states [facility] is requesting a new oxygen order.     3. Patient approves office to leave a detailed voicemail/MyChart message: N\A    Phone Number Called: [phone]    Call outcome: Spoke to patient regarding message below. June Acevedo Daughter in law.     Message: Confirmed patient information. They were instructed by [facility] new order needed. Informed I will communicate with DME and confirm.       I spoke to Mercedes at [facility]. They need chart notes. Informed last visit 3/17/22. Will fax visit notes. Patient is scheduled to follow up 9/19/22

## 2022-08-12 ENCOUNTER — HOSPITAL ENCOUNTER (OUTPATIENT)
Dept: RADIOLOGY | Facility: MEDICAL CENTER | Age: 62
End: 2022-08-12
Attending: NURSE PRACTITIONER
Payer: COMMERCIAL

## 2022-08-12 ENCOUNTER — HOSPITAL ENCOUNTER (OUTPATIENT)
Dept: RADIOLOGY | Facility: MEDICAL CENTER | Age: 62
End: 2022-08-12
Attending: INTERNAL MEDICINE
Payer: COMMERCIAL

## 2022-08-12 DIAGNOSIS — Z78.0 POSTMENOPAUSAL: ICD-10-CM

## 2022-08-12 DIAGNOSIS — Z12.31 ENCOUNTER FOR SCREENING MAMMOGRAM FOR BREAST CANCER: ICD-10-CM

## 2022-08-12 DIAGNOSIS — R91.8 LUNG NODULES: ICD-10-CM

## 2022-08-12 PROCEDURE — 77080 DXA BONE DENSITY AXIAL: CPT

## 2022-08-12 PROCEDURE — 77063 BREAST TOMOSYNTHESIS BI: CPT

## 2022-08-12 PROCEDURE — 71250 CT THORAX DX C-: CPT

## 2022-08-20 DIAGNOSIS — F41.1 GAD (GENERALIZED ANXIETY DISORDER): ICD-10-CM

## 2022-08-20 DIAGNOSIS — F33.0 MILD EPISODE OF RECURRENT MAJOR DEPRESSIVE DISORDER (HCC): ICD-10-CM

## 2022-08-23 RX ORDER — ESCITALOPRAM OXALATE 20 MG/1
TABLET ORAL
Qty: 90 TABLET | Refills: 3 | Status: SHIPPED | OUTPATIENT
Start: 2022-08-23 | End: 2023-07-31 | Stop reason: SDUPTHER

## 2022-08-23 NOTE — TELEPHONE ENCOUNTER
Received request via: Pharmacy    Was the patient seen in the last year in this department? Yes    Does the patient have an active prescription (recently filled or refills available) for medication(s) requested? No    Last office Visit:06/27/2022  Last Labs:02/22/2022

## 2022-09-19 ENCOUNTER — OFFICE VISIT (OUTPATIENT)
Dept: SLEEP MEDICINE | Facility: MEDICAL CENTER | Age: 62
End: 2022-09-19
Payer: COMMERCIAL

## 2022-09-19 VITALS
HEART RATE: 103 BPM | HEIGHT: 62 IN | BODY MASS INDEX: 32.39 KG/M2 | RESPIRATION RATE: 16 BRPM | SYSTOLIC BLOOD PRESSURE: 124 MMHG | OXYGEN SATURATION: 96 % | DIASTOLIC BLOOD PRESSURE: 68 MMHG | WEIGHT: 176 LBS

## 2022-09-19 DIAGNOSIS — J96.11 CHRONIC RESPIRATORY FAILURE WITH HYPOXIA (HCC): ICD-10-CM

## 2022-09-19 DIAGNOSIS — F17.210 TOBACCO DEPENDENCE DUE TO CIGARETTES: ICD-10-CM

## 2022-09-19 DIAGNOSIS — Z23 NEED FOR VACCINATION: ICD-10-CM

## 2022-09-19 DIAGNOSIS — Z72.0 TOBACCO USE: ICD-10-CM

## 2022-09-19 DIAGNOSIS — J44.9 CHRONIC OBSTRUCTIVE PULMONARY DISEASE, UNSPECIFIED COPD TYPE (HCC): ICD-10-CM

## 2022-09-19 PROCEDURE — 99214 OFFICE O/P EST MOD 30 MIN: CPT | Mod: 25 | Performed by: INTERNAL MEDICINE

## 2022-09-19 PROCEDURE — 90686 IIV4 VACC NO PRSV 0.5 ML IM: CPT | Performed by: INTERNAL MEDICINE

## 2022-09-19 PROCEDURE — 90471 IMMUNIZATION ADMIN: CPT | Performed by: INTERNAL MEDICINE

## 2022-09-19 RX ORDER — PREDNISONE 10 MG/1
TABLET ORAL
Qty: 18 TABLET | Refills: 0 | Status: SHIPPED | OUTPATIENT
Start: 2022-09-19 | End: 2023-02-24

## 2022-09-19 RX ORDER — AZITHROMYCIN 250 MG/1
TABLET, FILM COATED ORAL
Qty: 6 TABLET | Refills: 0 | Status: SHIPPED | OUTPATIENT
Start: 2022-09-19 | End: 2023-02-24

## 2022-09-19 RX ORDER — VARENICLINE TARTRATE 1 MG/1
TABLET, FILM COATED ORAL
Qty: 60 TABLET | Refills: 6 | Status: SHIPPED | OUTPATIENT
Start: 2022-09-19 | End: 2023-02-24 | Stop reason: SDUPTHER

## 2022-09-19 ASSESSMENT — ENCOUNTER SYMPTOMS
CONSTIPATION: 0
WHEEZING: 0
CLAUDICATION: 0
SPEECH CHANGE: 0
HEARTBURN: 0
PHOTOPHOBIA: 0
NECK PAIN: 0
ABDOMINAL PAIN: 0
CHILLS: 0
ORTHOPNEA: 0
DIZZINESS: 0
PALPITATIONS: 0
FOCAL WEAKNESS: 0
WEIGHT LOSS: 0
EYE PAIN: 0
DEPRESSION: 0
PND: 0
HEADACHES: 0
FALLS: 0
DOUBLE VISION: 0
COUGH: 0
BLURRED VISION: 0
FEVER: 0
DIARRHEA: 0
EYE REDNESS: 0
SHORTNESS OF BREATH: 1
HEMOPTYSIS: 0
WEAKNESS: 0
BACK PAIN: 0
STRIDOR: 0
SINUS PAIN: 0
SPUTUM PRODUCTION: 0
NAUSEA: 0
DIAPHORESIS: 0
MYALGIAS: 0
SORE THROAT: 0
VOMITING: 0
EYE DISCHARGE: 0
TREMORS: 0

## 2022-09-19 ASSESSMENT — FIBROSIS 4 INDEX: FIB4 SCORE: 1.5

## 2022-09-19 NOTE — PROGRESS NOTES
Chief Complaint   Patient presents with    COPD     Last seen 3/17/22     Results     Ct Chest Thorax          HPI: This patient is a 61 y.o. female whom is followed in our clinic for COPD c/b chronic hypoxic respiratory failure last seen by me on 3/17/22. PMHx is significant for HLP, HTN and COPD previously followed at [facility] 6 years ago. She is an active smoker currently on chantix, down to <1/2 ppd from 3/4 ppd. She is on advair 230 HFA and spiriva and prn PAULETTE via [facility]. She uses supplemental O2 at 3 LPM pulsed. She has not had exacerbation in the last 6 mos. She has had pulmonary nodules in the past but most recent CT from 8/12/22 showed subpleural scarring w/o distinct nodules, stable LAURA calcified granuloma. No LAD. PFTS from 3/2022 show FEV1 of 31% predicted or 0.24 L with + bronchodilator response, mild air trapping and reduced DLCO 60% predicted. She lives in [place] therefore pulmonary rehab has not been practical but she is willing to try virtual program, funcitonally she is MMRC III.     Past Medical History:   Diagnosis Date    COPD (chronic obstructive pulmonary disease) (HCC)     Essential hypertension 5/19/2021    GILL (generalized anxiety disorder) 5/19/2021    GERD (gastroesophageal reflux disease)     Mild episode of recurrent major depressive disorder (HCC) 5/19/2021    Mixed dyslipidemia 6/2/2021    Tobacco dependence due to cigarettes        Social History     Socioeconomic History    Marital status:      Spouse name: Not on file    Number of children: Not on file    Years of education: Not on file    Highest education level: Not on file   Occupational History    Not on file   Tobacco Use    Smoking status: Some Days     Packs/day: 1.00     Years: 40.00     Pack years: 40.00     Types: Cigarettes     Passive exposure: Past    Smokeless tobacco: Never   Vaping Use    Vaping Use: Never used   Substance and Sexual Activity    Alcohol use: Yes     Alcohol/week: 12.6 - 14.4 oz     Types: 21 - 24  Cans of beer per week     Comment: 4 beers per day    Drug use: Not Currently    Sexual activity: Yes     Partners: Male   Other Topics Concern    Not on file   Social History Narrative    Not on file     Social Determinants of Health     Financial Resource Strain: Not on file   Food Insecurity: Not on file   Transportation Needs: Not on file   Physical Activity: Not on file   Stress: Not on file   Social Connections: Not on file   Intimate Partner Violence: Not on file   Housing Stability: Not on file       Family History   Problem Relation Age of Onset    Kidney Disease Mother     Heart Disease Mother     Stroke Mother     Diabetes Mother     Diabetes Father     Kidney Disease Father     Arrythmia Father        Current Outpatient Medications on File Prior to Visit   Medication Sig Dispense Refill    escitalopram (LEXAPRO) 20 MG tablet TAKE 1 TABLET DAILY 90 Tablet 3    lisinopril-hydrochlorothiazide (PRINZIDE) 10-12.5 MG per tablet Take 1 Tablet by mouth every day. 90 Tablet 1    ipratropium-albuterol (DUONEB) 0.5-2.5 (3) MG/3ML nebulizer solution Take 3 mL by nebulization every four hours as needed for Shortness of Breath. 120 mL 11    atorvastatin (LIPITOR) 20 MG Tab Take 1 Tablet by mouth every evening. 90 Tablet 3    amLODIPine (NORVASC) 5 MG Tab Take 1 Tablet by mouth every day. 90 Tablet 3    fluticasone-salmeterol (ADVAIR HFA) 230-21 MCG/ACT inhaler Inhale 2 Puffs 2 times a day. 12 g 11    tiotropium (SPIRIVA RESPIMAT) 2.5 mcg/Act Aero Soln Inhale 2 Inhalation every day. 4 g 11    albuterol (PROAIR HFA) 108 (90 Base) MCG/ACT Aero Soln inhalation aerosol Inhale 2 Puffs every 6 hours as needed for Shortness of Breath. 18 g 6    hydrOXYzine HCl (ATARAX) 25 MG Tab Take 1 tablet by mouth 3 times a day as needed for Anxiety. 30 tablet 0     No current facility-administered medications on file prior to visit.       Penicillins      ROS:   Review of Systems   Constitutional:  Negative for chills, diaphoresis,  "fever, malaise/fatigue and weight loss.   HENT:  Negative for congestion, ear discharge, ear pain, hearing loss, nosebleeds, sinus pain, sore throat and tinnitus.    Eyes:  Negative for blurred vision, double vision, photophobia, pain, discharge and redness.   Respiratory:  Positive for shortness of breath. Negative for cough, hemoptysis, sputum production, wheezing and stridor.    Cardiovascular:  Negative for chest pain, palpitations, orthopnea, claudication, leg swelling and PND.   Gastrointestinal:  Negative for abdominal pain, constipation, diarrhea, heartburn, nausea and vomiting.   Genitourinary:  Negative for dysuria and urgency.   Musculoskeletal:  Negative for back pain, falls, joint pain, myalgias and neck pain.   Skin:  Negative for itching and rash.   Neurological:  Negative for dizziness, tremors, speech change, focal weakness, weakness and headaches.   Endo/Heme/Allergies:  Negative for environmental allergies.   Psychiatric/Behavioral:  Negative for depression.      /68 (BP Location: Right arm, Patient Position: Sitting, BP Cuff Size: Adult)   Pulse (!) 103   Resp 16   Ht 1.575 m (5' 2\")   Wt 79.8 kg (176 lb)   SpO2 96%   Physical Exam  Constitutional:       General: She is not in acute distress.     Appearance: Normal appearance. She is well-developed and normal weight.   HENT:      Head: Normocephalic and atraumatic.      Right Ear: External ear normal.      Left Ear: External ear normal.      Nose: Nose normal. No congestion.      Mouth/Throat:      Mouth: Mucous membranes are moist.      Pharynx: Oropharynx is clear. No oropharyngeal exudate.   Eyes:      General: No scleral icterus.     Extraocular Movements: Extraocular movements intact.      Conjunctiva/sclera: Conjunctivae normal.      Pupils: Pupils are equal, round, and reactive to light.   Neck:      Vascular: No JVD.      Trachea: No tracheal deviation.   Cardiovascular:      Rate and Rhythm: Normal rate and regular rhythm.     "  Heart sounds: Normal heart sounds. No murmur heard.    No friction rub. No gallop.   Pulmonary:      Effort: Pulmonary effort is normal. No accessory muscle usage or respiratory distress.      Breath sounds: No wheezing or rales.      Comments: Decreased air movement throughout  Abdominal:      General: There is no distension.      Palpations: Abdomen is soft.      Tenderness: There is no abdominal tenderness.   Musculoskeletal:         General: No tenderness or deformity. Normal range of motion.      Cervical back: Normal range of motion and neck supple.      Right lower leg: No edema.      Left lower leg: No edema.   Lymphadenopathy:      Cervical: No cervical adenopathy.   Skin:     General: Skin is warm and dry.      Findings: No rash.      Nails: There is no clubbing.   Neurological:      Mental Status: She is alert and oriented to person, place, and time.      Cranial Nerves: No cranial nerve deficit.      Gait: Gait normal.   Psychiatric:         Behavior: Behavior normal.       PFTs as reviewed by me personally: as per HPI    Imaging as reviewed by me personally:  as per hPI    Assessment:  1. Chronic obstructive pulmonary disease, unspecified COPD type (HCC)  Referral to Pulmonary Rehab    PULMONARY FUNCTION TESTS -Test requested: Complete Pulmonary Function Test      2. Chronic respiratory failure with hypoxia (HCC)  Referral to Pulmonary Rehab      3. Tobacco use        4. Need for vaccination  INFLUENZA VACCINE QUAD INJ (PF)      5. Tobacco dependence due to cigarettes  varenicline (CHANTIX) 1 MG tablet          Plan:  Chronic, severe but sxs controlled and stable on high dose ICS/LABA, LAMA, O2 and prn PAULETTE. Referral to virtual pulm rehab, counseled on tobacco cessation, update vaccines  Chronic and 2/2 COPD; O2 needs are stable; compliant with and benefiting from O2 at 3LPM; referral to pulm rehab  Using chantix, actively cutting back; encouraged her efforts at cessation; continue annual surveillance  CT imaging for lung Ca screening  Flu vaccine given today  Duplicate dx; see above  Return in about 6 months (around 3/19/2023) for PFTs same day as f/u .

## 2022-09-27 ENCOUNTER — APPOINTMENT (OUTPATIENT)
Dept: RADIOLOGY | Facility: MEDICAL CENTER | Age: 62
End: 2022-09-27
Attending: EMERGENCY MEDICINE
Payer: COMMERCIAL

## 2022-09-27 ENCOUNTER — HOSPITAL ENCOUNTER (EMERGENCY)
Facility: MEDICAL CENTER | Age: 62
End: 2022-09-27
Attending: EMERGENCY MEDICINE
Payer: COMMERCIAL

## 2022-09-27 ENCOUNTER — OFFICE VISIT (OUTPATIENT)
Dept: MEDICAL GROUP | Facility: PHYSICIAN GROUP | Age: 62
End: 2022-09-27
Payer: COMMERCIAL

## 2022-09-27 VITALS
HEART RATE: 90 BPM | OXYGEN SATURATION: 100 % | WEIGHT: 176.37 LBS | HEIGHT: 61 IN | BODY MASS INDEX: 33.3 KG/M2 | TEMPERATURE: 98 F | DIASTOLIC BLOOD PRESSURE: 80 MMHG | RESPIRATION RATE: 20 BRPM | SYSTOLIC BLOOD PRESSURE: 172 MMHG

## 2022-09-27 VITALS
DIASTOLIC BLOOD PRESSURE: 80 MMHG | HEIGHT: 62 IN | RESPIRATION RATE: 22 BRPM | HEART RATE: 98 BPM | TEMPERATURE: 97.7 F | WEIGHT: 175 LBS | BODY MASS INDEX: 32.2 KG/M2 | OXYGEN SATURATION: 99 % | SYSTOLIC BLOOD PRESSURE: 118 MMHG

## 2022-09-27 DIAGNOSIS — R51.9 NONINTRACTABLE HEADACHE, UNSPECIFIED CHRONICITY PATTERN, UNSPECIFIED HEADACHE TYPE: ICD-10-CM

## 2022-09-27 DIAGNOSIS — M79.622 LEFT UPPER ARM PAIN: ICD-10-CM

## 2022-09-27 DIAGNOSIS — G44.89 OTHER HEADACHE SYNDROME: ICD-10-CM

## 2022-09-27 DIAGNOSIS — I77.1 SUBCLAVIAN ARTERIAL STENOSIS (HCC): ICD-10-CM

## 2022-09-27 DIAGNOSIS — M79.602 LEFT ARM PAIN: ICD-10-CM

## 2022-09-27 LAB
ALBUMIN SERPL BCP-MCNC: 4.1 G/DL (ref 3.2–4.9)
ALBUMIN/GLOB SERPL: 1.3 G/DL
ALP SERPL-CCNC: 90 U/L (ref 30–99)
ALT SERPL-CCNC: 30 U/L (ref 2–50)
ANION GAP SERPL CALC-SCNC: 6 MMOL/L (ref 7–16)
APTT PPP: 30.3 SEC (ref 24.7–36)
AST SERPL-CCNC: 26 U/L (ref 12–45)
BASOPHILS # BLD AUTO: 0.9 % (ref 0–1.8)
BASOPHILS # BLD: 0.08 K/UL (ref 0–0.12)
BILIRUB SERPL-MCNC: 0.7 MG/DL (ref 0.1–1.5)
BUN SERPL-MCNC: 7 MG/DL (ref 8–22)
CALCIUM SERPL-MCNC: 8.9 MG/DL (ref 8.5–10.5)
CHLORIDE SERPL-SCNC: 95 MMOL/L (ref 96–112)
CO2 SERPL-SCNC: 31 MMOL/L (ref 20–33)
CREAT SERPL-MCNC: 0.7 MG/DL (ref 0.5–1.4)
EKG IMPRESSION: NORMAL
EOSINOPHIL # BLD AUTO: 0.31 K/UL (ref 0–0.51)
EOSINOPHIL NFR BLD: 3.4 % (ref 0–6.9)
ERYTHROCYTE [DISTWIDTH] IN BLOOD BY AUTOMATED COUNT: 55.8 FL (ref 35.9–50)
GFR SERPLBLD CREATININE-BSD FMLA CKD-EPI: 98 ML/MIN/1.73 M 2
GLOBULIN SER CALC-MCNC: 3.2 G/DL (ref 1.9–3.5)
GLUCOSE SERPL-MCNC: 100 MG/DL (ref 65–99)
HCT VFR BLD AUTO: 48.9 % (ref 37–47)
HGB BLD-MCNC: 16.9 G/DL (ref 12–16)
IMM GRANULOCYTES # BLD AUTO: 0.04 K/UL (ref 0–0.11)
IMM GRANULOCYTES NFR BLD AUTO: 0.4 % (ref 0–0.9)
INR PPP: 1.01 (ref 0.87–1.13)
LYMPHOCYTES # BLD AUTO: 1.56 K/UL (ref 1–4.8)
LYMPHOCYTES NFR BLD: 17.1 % (ref 22–41)
MCH RBC QN AUTO: 32.4 PG (ref 27–33)
MCHC RBC AUTO-ENTMCNC: 34.6 G/DL (ref 33.6–35)
MCV RBC AUTO: 93.7 FL (ref 81.4–97.8)
MONOCYTES # BLD AUTO: 0.59 K/UL (ref 0–0.85)
MONOCYTES NFR BLD AUTO: 6.5 % (ref 0–13.4)
NEUTROPHILS # BLD AUTO: 6.55 K/UL (ref 2–7.15)
NEUTROPHILS NFR BLD: 71.7 % (ref 44–72)
NRBC # BLD AUTO: 0 K/UL
NRBC BLD-RTO: 0 /100 WBC
PLATELET # BLD AUTO: 254 K/UL (ref 164–446)
PMV BLD AUTO: 9.7 FL (ref 9–12.9)
POTASSIUM SERPL-SCNC: 4.4 MMOL/L (ref 3.6–5.5)
PROT SERPL-MCNC: 7.3 G/DL (ref 6–8.2)
PROTHROMBIN TIME: 13.2 SEC (ref 12–14.6)
RBC # BLD AUTO: 5.22 M/UL (ref 4.2–5.4)
SODIUM SERPL-SCNC: 132 MMOL/L (ref 135–145)
TROPONIN T SERPL-MCNC: 17 NG/L (ref 6–19)
WBC # BLD AUTO: 9.1 K/UL (ref 4.8–10.8)

## 2022-09-27 PROCEDURE — 71275 CT ANGIOGRAPHY CHEST: CPT

## 2022-09-27 PROCEDURE — 85025 COMPLETE CBC W/AUTO DIFF WBC: CPT

## 2022-09-27 PROCEDURE — 99215 OFFICE O/P EST HI 40 MIN: CPT | Performed by: NURSE PRACTITIONER

## 2022-09-27 PROCEDURE — 85730 THROMBOPLASTIN TIME PARTIAL: CPT

## 2022-09-27 PROCEDURE — 70450 CT HEAD/BRAIN W/O DYE: CPT

## 2022-09-27 PROCEDURE — 85610 PROTHROMBIN TIME: CPT

## 2022-09-27 PROCEDURE — 700117 HCHG RX CONTRAST REV CODE 255: Performed by: EMERGENCY MEDICINE

## 2022-09-27 PROCEDURE — 93000 ELECTROCARDIOGRAM COMPLETE: CPT | Performed by: NURSE PRACTITIONER

## 2022-09-27 PROCEDURE — 96374 THER/PROPH/DIAG INJ IV PUSH: CPT

## 2022-09-27 PROCEDURE — 80053 COMPREHEN METABOLIC PANEL: CPT

## 2022-09-27 PROCEDURE — 36415 COLL VENOUS BLD VENIPUNCTURE: CPT

## 2022-09-27 PROCEDURE — 700111 HCHG RX REV CODE 636 W/ 250 OVERRIDE (IP): Performed by: EMERGENCY MEDICINE

## 2022-09-27 PROCEDURE — 84484 ASSAY OF TROPONIN QUANT: CPT

## 2022-09-27 PROCEDURE — 93005 ELECTROCARDIOGRAM TRACING: CPT | Performed by: EMERGENCY MEDICINE

## 2022-09-27 PROCEDURE — 99284 EMERGENCY DEPT VISIT MOD MDM: CPT

## 2022-09-27 RX ORDER — KETOROLAC TROMETHAMINE 30 MG/ML
30 INJECTION, SOLUTION INTRAMUSCULAR; INTRAVENOUS ONCE
Status: DISCONTINUED | OUTPATIENT
Start: 2022-09-27 | End: 2022-09-27

## 2022-09-27 RX ORDER — KETOROLAC TROMETHAMINE 30 MG/ML
30 INJECTION, SOLUTION INTRAMUSCULAR; INTRAVENOUS ONCE
Status: COMPLETED | OUTPATIENT
Start: 2022-09-27 | End: 2022-09-27

## 2022-09-27 RX ORDER — NAPROXEN 500 MG/1
500 TABLET ORAL 2 TIMES DAILY WITH MEALS
Qty: 60 TABLET | Refills: 0 | Status: SHIPPED | OUTPATIENT
Start: 2022-09-27

## 2022-09-27 RX ADMIN — KETOROLAC TROMETHAMINE 30 MG: 30 INJECTION, SOLUTION INTRAMUSCULAR; INTRAVENOUS at 21:12

## 2022-09-27 RX ADMIN — IOHEXOL 100 ML: 350 INJECTION, SOLUTION INTRAVENOUS at 21:25

## 2022-09-27 ASSESSMENT — ENCOUNTER SYMPTOMS
NAUSEA: 0
SENSORY CHANGE: 0
SHORTNESS OF BREATH: 0
VOMITING: 0
ABDOMINAL PAIN: 0
HEADACHES: 1
FEVER: 0

## 2022-09-27 ASSESSMENT — PATIENT HEALTH QUESTIONNAIRE - PHQ9
SUM OF ALL RESPONSES TO PHQ9 QUESTIONS 1 AND 2: 6
7. TROUBLE CONCENTRATING ON THINGS, SUCH AS READING THE NEWSPAPER OR WATCHING TELEVISION: NOT AT ALL
2. FEELING DOWN, DEPRESSED, IRRITABLE, OR HOPELESS: NEARLY EVERY DAY
3. TROUBLE FALLING OR STAYING ASLEEP OR SLEEPING TOO MUCH: NEARLY EVERY DAY
4. FEELING TIRED OR HAVING LITTLE ENERGY: NEARLY EVERY DAY
5. POOR APPETITE OR OVEREATING: NEARLY EVERY DAY
9. THOUGHTS THAT YOU WOULD BE BETTER OFF DEAD, OR OF HURTING YOURSELF: NOT AT ALL
6. FEELING BAD ABOUT YOURSELF - OR THAT YOU ARE A FAILURE OR HAVE LET YOURSELF OR YOUR FAMILY DOWN: NOT AL ALL
1. LITTLE INTEREST OR PLEASURE IN DOING THINGS: NEARLY EVERY DAY

## 2022-09-27 ASSESSMENT — FIBROSIS 4 INDEX
FIB4 SCORE: 1.5
FIB4 SCORE: 1.5

## 2022-09-27 NOTE — ED TRIAGE NOTES
Chief Complaint   Patient presents with    Headache     For about 2 weeks      Arm Pain     L sided. Pain for about 2 weeks.      Pt ambulatory to triage for above complaint. Pt reports she was sent here by her PCP for above complaints. Pt is concerned with occlusion in her arm that she has known about for months, but that it may be getting worse. CSM intact at this time.

## 2022-09-27 NOTE — ASSESSMENT & PLAN NOTE
Left upper arm sharp, with movement, grasping or pulling her O2 contanier. Unable to lift arm above shoulder.   No know injury.  Patient is concerned that this could be pain due to her left subclavian arterial stenosis.  She indicates that the surgeons she saw said that if it started to cause pain that she should be reevaluated.    Continues to smoke cigarettes and is smoking about 5 per day.   Drinks 3 beers a day.

## 2022-09-27 NOTE — LETTER
[address] [address]     September 27, 2022    Patient: Kristel Sewell   YOB: 1960   Date of Visit: 9/27/2022       To Whom It May Concern:    Kristel Sewell was seen and treated in our department on 9/27/2022.  Patient was sent to the emergency department and  provided transportation.    Sincerely,         AIDA Bains.

## 2022-09-27 NOTE — ASSESSMENT & PLAN NOTE
New condition.  Patient reports 2 weeks of a posterior headache all day. It does not keep her up at night.  States that it is a dull, achy, sharp headache 7-8/10.  Patient has had intermittent nausea.    Denies thunderclap headache.  Denies high blood pressure at home, any chest pain, shortness of breath, or vision changes.    Did try over-the-counter aspirin without any relief.

## 2022-09-27 NOTE — PROGRESS NOTES
Chief Complaint   Patient presents with    Pain     Left arm- Surgeon told her she had clot in left arm       Subjective:     HPI:   Kristel Sewell is a 61 y.o. female here to discuss the evaluation and management of:      Other headache syndrome  New condition.  Patient reports 2 weeks of a posterior headache all day. It does not keep her up at night.  States that it is a dull, achy, sharp headache 7-8/10.  Patient has had intermittent nausea.    Denies thunderclap headache.  Denies high blood pressure at home, any chest pain, shortness of breath, or vision changes.    Did try over-the-counter aspirin without any relief.    Left upper arm pain  Left upper arm sharp, with movement, grasping or pulling her O2 contanier. Unable to lift arm above shoulder.   No know injury.  Patient is concerned that this could be pain due to her left subclavian arterial stenosis.  She indicates that the surgeons she saw said that if it started to cause pain that she should be reevaluated.    Continues to smoke cigarettes and is smoking about 5 per day.   Drinks 3 beers a day.     ROS:  Gen: no fevers/chills, no changes in weight  Pulm: Chronic shortness of breath unchanged.  CV: no chest pain, no palpitations  GI: no nausea/vomiting, no diarrhea  MSk: Positive per HPI  Neuro: Positive per HPI    Allergies   Allergen Reactions    Penicillins        Current medicines (including changes today)  Current Outpatient Medications   Medication Sig Dispense Refill    predniSONE (DELTASONE) 10 MG Tab Take 30mg x 3 days, then take 20mg x 3 days, then take 10mg x 3 days, with food, then discontinue. 18 Tablet 0    azithromycin (ZITHROMAX) 250 MG Tab Take 2 tablets on day 1, then take 1 tablet a day for 4 days. 6 Tablet 0    varenicline (CHANTIX) 1 MG tablet One tablet daily as maintainence 60 Tablet 6    escitalopram (LEXAPRO) 20 MG tablet TAKE 1 TABLET DAILY 90 Tablet 3    lisinopril-hydrochlorothiazide (PRINZIDE) 10-12.5 MG per tablet Take  "1 Tablet by mouth every day. 90 Tablet 1    ipratropium-albuterol (DUONEB) 0.5-2.5 (3) MG/3ML nebulizer solution Take 3 mL by nebulization every four hours as needed for Shortness of Breath. 120 mL 11    atorvastatin (LIPITOR) 20 MG Tab Take 1 Tablet by mouth every evening. 90 Tablet 3    amLODIPine (NORVASC) 5 MG Tab Take 1 Tablet by mouth every day. 90 Tablet 3    fluticasone-salmeterol (ADVAIR HFA) 230-21 MCG/ACT inhaler Inhale 2 Puffs 2 times a day. 12 g 11    tiotropium (SPIRIVA RESPIMAT) 2.5 mcg/Act Aero Soln Inhale 2 Inhalation every day. 4 g 11    albuterol (PROAIR HFA) 108 (90 Base) MCG/ACT Aero Soln inhalation aerosol Inhale 2 Puffs every 6 hours as needed for Shortness of Breath. 18 g 6    hydrOXYzine HCl (ATARAX) 25 MG Tab Take 1 tablet by mouth 3 times a day as needed for Anxiety. 30 tablet 0     No current facility-administered medications for this visit.          Objective:       /80 (BP Location: Left arm)   Pulse 98   Temp 36.5 °C (97.7 °F) (Temporal)   Resp (!) 22   Ht 1.575 m (5' 2\")   Wt 79.4 kg (175 lb)   SpO2 99%  Body mass index is 32.01 kg/m².    Physical Exam  Vitals reviewed.   Constitutional:       Appearance: Normal appearance.   Cardiovascular:      Rate and Rhythm: Normal rate and regular rhythm.      Pulses:           Radial pulses are 2+ on the right side and 1+ on the left side.      Heart sounds: Normal heart sounds. No murmur heard.  Pulmonary:      Effort: No respiratory distress.      Breath sounds: Decreased air movement present. Decreased breath sounds and wheezing present. No rhonchi or rales.      Comments: On 24/7 oxygen supplementation.  Musculoskeletal:      Cervical back: Normal range of motion.      Right lower leg: No edema.      Left lower leg: No edema.      Comments: Left shoulder: Decreased range of motion unable to move above shoulder plateau.  Strength 5 out of 5.  Mild tenderness to palpation over deltoid.  No tenderness on anterior joint line. "   Lymphadenopathy:      Cervical: No cervical adenopathy.   Skin:     General: Skin is warm and dry.   Neurological:      Mental Status: She is alert and oriented to person, place, and time.   Psychiatric:         Mood and Affect: Mood normal.         Behavior: Behavior normal.         Thought Content: Thought content normal.         Judgment: Judgment normal.     EKG Interpretation    Interpreted by DELORIS Hugo    Rhythm: normal sinus   Rate: normal  Axis: left  Ectopy: none  Conduction: right bundle branch block (incomplete)  ST Segments: no acute change  T Waves: no acute change  Q Waves: II, III, and aVf    Clinical Impression: Sinus rhythm with new incomplete right bundle branch block, probable left atrial enlargement and abnormal.  Q waves in lead II, III, and aVF.      Assessment and Plan:     The following treatment plan was discussed:  61-year-old female presents today with ongoing headache, intermittent nausea, and left arm pain x2 weeks.  Today she denies any chest pain, change in shortness of breath symptoms, or vision changes. Significant comorbidities including oxygen dependent COPD, hypertension (controlled on medication), severe peripheral vascular disease, and subclavian artery stenosis on left side.  Patient continues to abuse alcohol and smokes cigarettes.  EKG was performed today in clinic indicating new incomplete right bundle bettie block, probable left atrial enlargement, and abnormal inferior Q waves in leads I, III, and aVF.  Due to symptoms and significant risk of possible MI, CVA, or subclavian steal syndrome, patient was instructed to go directly to the emergency department.    Did offer ambulance ride however they declined and will go POV.    1. Other headache syndrome  - EKG - Clinic Performed  - US-CAROTID DOPPLER BILAT; Future    2. Left upper arm pain  - EKG - Clinic Performed  - US-CAROTID DOPPLER BILAT; Future    3. Subclavian arterial stenosis (HCC)  - EKG - Clinic  Performed  - US-CAROTID DOPPLER BILAT; Future        Any change or worsening of signs or symptoms, patient encouraged to follow-up or report to urgent care or emergency room for further evaluation. Patient verbalizes understanding and agrees.    Follow-Up: Return in about 1 week (around 10/4/2022) for  hospital follow-up.      My total time spent caring for the patient on the day of the encounter was 49 minutes.   This does not include time spent on separately billable procedures/tests.      PLEASE NOTE: This dictation was created using voice recognition software. I have made every reasonable attempt to correct obvious errors, but I expect that there are errors of grammar and possibly content that I did not discover before finalizing the note.

## 2022-09-28 NOTE — ED NOTES
Discharge instructions given to pt. Prescriptions sent to pt's pharmacy. Pt educated, verbalizes understanding. All belongings accounted for. Pt wheeled out of ED with family to go home. PIV removed and dressing applied.

## 2022-09-28 NOTE — ED PROVIDER NOTES
"ED Provider Note    Scribed for Nati Pittman M.D. by Suri John. 9/27/2022, 8:26 PM.    Primary care provider: DELORIS Bains  Means of arrival: Walk-in  History obtained from: Patient  History limited by: None    CHIEF COMPLAINT  Chief Complaint   Patient presents with    Headache     For about 2 weeks      Arm Pain     L sided. Pain for about 2 weeks.        HPI  Kristel Sewell is a 61 y.o. female who presents to the Emergency Department for left arm pain.  Patient reports that for the last 2 weeks she has been having left arm pain that is worsened when she tries to lift her oxygen tank.  She reports that she is unable to lift her arm above her shoulder secondary to pain.  She reports that she was diagnosed with a \"blockage\" in her left subclavian artery 1 year ago and she is concerned that this may be causing the issue.  She presented to her primary care physician earlier today and was sent here for further evaluation.    She reports that her primary care doctor's office she had an EKG and was advised that this appeared slightly abnormal and was also advised to have this assessed.  Patient also complaining of headache.  She describes it as a posterior mild throbbing pain that has been ongoing for 2 weeks as well.  She denies any injuries.  Denies numbness, tingling or weakness.  She has a history of hypertension and COPD on chronic oxygen at 3 to 4 L at baseline.  Of note patient reports that she did follow-up for her subclavian artery abnormality approximately 1 year ago (per chart review this was with Dr. Babb) and as she was asymptomatic no need for intervention or further work-up at that time.    Reviewed patient's old medical records which showed which showed she was seen by her PCP today. She has history of left subclavian arterial stenosis on CT scan approximately 1 year ago which was asymptomatic. records the patient saw Dr. Babb on 12/14/2021.    REVIEW OF SYSTEMS  Review " of Systems   Constitutional:  Negative for fever.   Respiratory:  Negative for shortness of breath.    Cardiovascular:  Negative for chest pain.   Gastrointestinal:  Negative for abdominal pain, nausea and vomiting.   Musculoskeletal:         Positive for left arm pain   Neurological:  Positive for headaches. Negative for sensory change.   All other systems reviewed and are negative.    PAST MEDICAL HISTORY   has a past medical history of COPD (chronic obstructive pulmonary disease) ([facility]), Essential hypertension (5/19/2021), GILL (generalized anxiety disorder) (5/19/2021), GERD (gastroesophageal reflux disease), Mild episode of recurrent major depressive disorder ([facility]) (5/19/2021), Mixed dyslipidemia (6/2/2021), and Tobacco dependence due to cigarettes.    SURGICAL HISTORY  patient denies any surgical history    SOCIAL HISTORY  Social History     Tobacco Use    Smoking status: Some Days     Packs/day: 0.50     Years: 40.00     Pack years: 20.00     Types: Cigarettes     Passive exposure: Past    Smokeless tobacco: Never   Vaping Use    Vaping Use: Never used   Substance Use Topics    Alcohol use: Yes     Alcohol/week: 12.6 - 14.4 oz     Types: 21 - 24 Cans of beer per week     Comment: 4 beers per day    Drug use: Not Currently      Social History     Substance and Sexual Activity   Drug Use Not Currently       FAMILY HISTORY  Family History   Problem Relation Age of Onset    Kidney Disease Mother     Heart Disease Mother     Stroke Mother     Diabetes Mother     Diabetes Father     Kidney Disease Father     Arrythmia Father        CURRENT MEDICATIONS  Home Medications       Reviewed by Chayito Smith R.N. (Registered Nurse) on 09/27/22 at 1658  Med List Status: Not Addressed     Medication Last Dose Status   albuterol (PROAIR HFA) 108 (90 Base) MCG/ACT Aero Soln inhalation aerosol  Active   amLODIPine (NORVASC) 5 MG Tab  Active   atorvastatin (LIPITOR) 20 MG Tab  Active   azithromycin (ZITHROMAX) 250 MG Tab   "Active   escitalopram (LEXAPRO) 20 MG tablet  Active   fluticasone-salmeterol (ADVAIR HFA) 230-21 MCG/ACT inhaler  Active   hydrOXYzine HCl (ATARAX) 25 MG Tab  Active   ipratropium-albuterol (DUONEB) 0.5-2.5 (3) MG/3ML nebulizer solution  Active   lisinopril-hydrochlorothiazide (PRINZIDE) 10-12.5 MG per tablet  Active   predniSONE (DELTASONE) 10 MG Tab  Active   tiotropium (SPIRIVA RESPIMAT) 2.5 mcg/Act Aero Soln  Active   varenicline (CHANTIX) 1 MG tablet  Active                     ALLERGIES  Allergies   Allergen Reactions    Penicillins        PHYSICAL EXAM  VITAL SIGNS: BP (!) 169/88   Pulse 92   Temp 36.8 °C (98.3 °F) (Oral)   Resp 20   Ht 1.549 m (5' 1\")   Wt 80 kg (176 lb 5.9 oz)   SpO2 99%   BMI 33.32 kg/m²   Vitals reviewed by myself.  Physical Exam  Nursing note and vitals reviewed.  Constitutional: Well-developed and well-nourished. No acute distress.   HENT: Head is normocephalic and atraumatic.  Eyes: extra-ocular movements intact  Cardiovascular: regular rate and regular rhythm.  2+ bilateral radial pulses, brisk capillary refill in all distal fingertips  Pulmonary/Chest: Breath sounds normal. No wheezes or rales.   Musculoskeletal: 5 out of 5  strength in bilateral upper extremities.  Patient has pain with ranging her left arm past 90 degrees of flexion and abduction  Neurological: Awake and alert  Skin: Skin is warm and dry. No rash.       DIAGNOSTIC STUDIES  LABS  Labs Reviewed   CBC WITH DIFFERENTIAL - Abnormal; Notable for the following components:       Result Value    Hemoglobin 16.9 (*)     Hematocrit 48.9 (*)     RDW 55.8 (*)     Lymphocytes 17.10 (*)     All other components within normal limits   COMP METABOLIC PANEL - Abnormal; Notable for the following components:    Sodium 132 (*)     Chloride 95 (*)     Anion Gap 6.0 (*)     Glucose 100 (*)     Bun 7 (*)     All other components within normal limits   APTT    Narrative:     Biotin intake of greater than 5 mg per day may " interfere with  troponin levels, causing false low values.  Indicate which anticoagulants the patient is on:->UNKNOWN   PROTHROMBIN TIME    Narrative:     Biotin intake of greater than 5 mg per day may interfere with  troponin levels, causing false low values.  Indicate which anticoagulants the patient is on:->UNKNOWN   TROPONIN   ESTIMATED GFR     All labs reviewed by me.    EKG Interpretation:  Interpreted by myself    12 Lead EKG interpreted by me to show:  EKG at 8:48 PM: Normal sinus rhythm, heart rate 90, normal axis, normal intervals, ,  with associated incomplete right bundle branch block, QTc 490, no acute ST-T segment changes, no evidence of acute arrhythmia or ischemia per my interpretation  My impression of this EKG: Does not indicate ischemia or arrhythmia at this time.    RADIOLOGY  CT-CTA CHEST WITH & W/O-POST PROCESS   Final Result         1.  Left subclavian arterial wall thickening with approximately 60% stenosis.   2.  Irregular hepatic contour likely related to changes of cirrhosis.   3.  Large hiatal hernia   4.  Emphysema   5.  Atherosclerosis and atherosclerotic coronary artery disease.      CT-HEAD W/O   Final Result         1.  No acute intracranial abnormality.   2.  Atherosclerosis.           The radiologist's interpretation of all radiological studies have been reviewed by me.    REASSESSMENT    8:26 PM - Patient seen and examined at bedside. Discussed plan of care, including labs and imaging. Patient agrees to the plan of care.      COURSE & MEDICAL DECISION MAKING  Nursing notes, VS, PMSFHx reviewed in chart.    Patient is a 61-year-old female who comes in for evaluation of left arm pain.  Differential diagnosis includes musculoskeletal pain, tendinitis, overuse injury, subclavian artery stenosis, claudication, atypical presentation of acute coronary syndrome.  Diagnostic work-up includes CT of the chest, labs and EKG.  Patient also complaining of headache with no prior  history of migraine headaches, will obtain a CT of the head.    Patient's initial vitals are within normal limits and she is neurovascularly intact on exam.  Patient is treated with Toradol after which she feels greatly improved.  Imaging of the head returns demonstrates no acute intracranial abnormality.  EKG returns and demonstrates incomplete right bundle branch block, this appears to be new for patient however there is no ischemia, she is not having any chest pain or cardiac symptoms.  Troponin is within normal limits.  She has a heart score of 1 making her low risk for acute coronary syndrome.  CT of the chest returns and demonstrates left subclavian artery stenosis of 60%.  Discussed the case with Dr. Babb he advises that this is unchanged from prior and this should not be causing her symptoms as she still has good blood flow with good palpable pulses.  Therefore patient is reassured at this time that pain may be radicular pain versus musculoskeletal pain.  Labs and work-up at this time are reassuring and she should follow-up with her primary care physician.  As she felt improved after anti-inflammatories I will start her on naproxen for home.  She is amenable to this plan.  She is then given strict return precautions and discharged in stable condition.      FINAL IMPRESSION  1. Left arm pain    2. Nonintractable headache, unspecified chronicity pattern, unspecified headache type          I, Suri John ([place]), chau scribing for, and in the presence of, Nati Pittman M.D..    Electronically signed by: Suri John ([place]), 9/27/2022    INati M.D. personally performed the services described in this documentation, as scribed by Suri John in my presence, and it is both accurate and complete.     The note accurately reflects work and decisions made by me.  Nati Pittman M.D.  9/27/2022  10:52 PM

## 2022-10-14 ENCOUNTER — HOSPITAL ENCOUNTER (OUTPATIENT)
Dept: RADIOLOGY | Facility: MEDICAL CENTER | Age: 62
End: 2022-10-14
Attending: NURSE PRACTITIONER
Payer: COMMERCIAL

## 2022-10-14 DIAGNOSIS — M79.622 LEFT UPPER ARM PAIN: ICD-10-CM

## 2022-10-14 DIAGNOSIS — I77.1 SUBCLAVIAN ARTERIAL STENOSIS (HCC): ICD-10-CM

## 2022-10-14 DIAGNOSIS — G44.89 OTHER HEADACHE SYNDROME: ICD-10-CM

## 2022-10-14 PROCEDURE — 93880 EXTRACRANIAL BILAT STUDY: CPT | Mod: 26 | Performed by: INTERNAL MEDICINE

## 2022-10-14 PROCEDURE — 93880 EXTRACRANIAL BILAT STUDY: CPT

## 2023-01-25 ENCOUNTER — HOSPITAL ENCOUNTER (OUTPATIENT)
Dept: LAB | Facility: MEDICAL CENTER | Age: 63
End: 2023-01-25
Attending: NURSE PRACTITIONER
Payer: COMMERCIAL

## 2023-01-25 DIAGNOSIS — E78.5 DYSLIPIDEMIA: ICD-10-CM

## 2023-01-25 DIAGNOSIS — J44.89 COPD (CHRONIC OBSTRUCTIVE PULMONARY DISEASE) WITH CHRONIC BRONCHITIS (HCC): ICD-10-CM

## 2023-01-25 DIAGNOSIS — I10 ESSENTIAL HYPERTENSION: ICD-10-CM

## 2023-01-25 DIAGNOSIS — E55.9 VITAMIN D DEFICIENCY: ICD-10-CM

## 2023-01-25 LAB
25(OH)D3 SERPL-MCNC: 7 NG/ML (ref 30–100)
ALBUMIN SERPL BCP-MCNC: 4.1 G/DL (ref 3.2–4.9)
ALBUMIN/GLOB SERPL: 1.3 G/DL
ALP SERPL-CCNC: 76 U/L (ref 30–99)
ALT SERPL-CCNC: 12 U/L (ref 2–50)
ANION GAP SERPL CALC-SCNC: 10 MMOL/L (ref 7–16)
AST SERPL-CCNC: 18 U/L (ref 12–45)
BASOPHILS # BLD AUTO: 0.9 % (ref 0–1.8)
BASOPHILS # BLD: 0.06 K/UL (ref 0–0.12)
BILIRUB SERPL-MCNC: 0.5 MG/DL (ref 0.1–1.5)
BUN SERPL-MCNC: 9 MG/DL (ref 8–22)
CALCIUM ALBUM COR SERPL-MCNC: 8.9 MG/DL (ref 8.5–10.5)
CALCIUM SERPL-MCNC: 9 MG/DL (ref 8.5–10.5)
CHLORIDE SERPL-SCNC: 97 MMOL/L (ref 96–112)
CHOLEST SERPL-MCNC: 162 MG/DL (ref 100–199)
CO2 SERPL-SCNC: 32 MMOL/L (ref 20–33)
CREAT SERPL-MCNC: 0.72 MG/DL (ref 0.5–1.4)
EOSINOPHIL # BLD AUTO: 0.31 K/UL (ref 0–0.51)
EOSINOPHIL NFR BLD: 4.8 % (ref 0–6.9)
ERYTHROCYTE [DISTWIDTH] IN BLOOD BY AUTOMATED COUNT: 47.8 FL (ref 35.9–50)
FASTING STATUS PATIENT QL REPORTED: NORMAL
GFR SERPLBLD CREATININE-BSD FMLA CKD-EPI: 95 ML/MIN/1.73 M 2
GLOBULIN SER CALC-MCNC: 3.2 G/DL (ref 1.9–3.5)
GLUCOSE SERPL-MCNC: 81 MG/DL (ref 65–99)
HCT VFR BLD AUTO: 47.1 % (ref 37–47)
HDLC SERPL-MCNC: 38 MG/DL
HGB BLD-MCNC: 14.9 G/DL (ref 12–16)
IMM GRANULOCYTES # BLD AUTO: 0.02 K/UL (ref 0–0.11)
IMM GRANULOCYTES NFR BLD AUTO: 0.3 % (ref 0–0.9)
LDLC SERPL CALC-MCNC: 104 MG/DL
LYMPHOCYTES # BLD AUTO: 1.61 K/UL (ref 1–4.8)
LYMPHOCYTES NFR BLD: 25 % (ref 22–41)
MCH RBC QN AUTO: 30.5 PG (ref 27–33)
MCHC RBC AUTO-ENTMCNC: 31.6 G/DL (ref 33.6–35)
MCV RBC AUTO: 96.5 FL (ref 81.4–97.8)
MONOCYTES # BLD AUTO: 0.49 K/UL (ref 0–0.85)
MONOCYTES NFR BLD AUTO: 7.6 % (ref 0–13.4)
NEUTROPHILS # BLD AUTO: 3.94 K/UL (ref 2–7.15)
NEUTROPHILS NFR BLD: 61.4 % (ref 44–72)
NRBC # BLD AUTO: 0 K/UL
NRBC BLD-RTO: 0 /100 WBC
PLATELET # BLD AUTO: 230 K/UL (ref 164–446)
PMV BLD AUTO: 10.7 FL (ref 9–12.9)
POTASSIUM SERPL-SCNC: 4.6 MMOL/L (ref 3.6–5.5)
PROT SERPL-MCNC: 7.3 G/DL (ref 6–8.2)
RBC # BLD AUTO: 4.88 M/UL (ref 4.2–5.4)
SODIUM SERPL-SCNC: 139 MMOL/L (ref 135–145)
TRIGL SERPL-MCNC: 100 MG/DL (ref 0–149)
WBC # BLD AUTO: 6.4 K/UL (ref 4.8–10.8)

## 2023-01-25 PROCEDURE — 85025 COMPLETE CBC W/AUTO DIFF WBC: CPT

## 2023-01-25 PROCEDURE — 80053 COMPREHEN METABOLIC PANEL: CPT

## 2023-01-25 PROCEDURE — 82306 VITAMIN D 25 HYDROXY: CPT

## 2023-01-25 PROCEDURE — 80061 LIPID PANEL: CPT

## 2023-01-25 PROCEDURE — 36415 COLL VENOUS BLD VENIPUNCTURE: CPT

## 2023-02-24 ENCOUNTER — OFFICE VISIT (OUTPATIENT)
Dept: MEDICAL GROUP | Facility: PHYSICIAN GROUP | Age: 63
End: 2023-02-24
Payer: COMMERCIAL

## 2023-02-24 VITALS — HEART RATE: 87 BPM | OXYGEN SATURATION: 90 % | SYSTOLIC BLOOD PRESSURE: 122 MMHG | DIASTOLIC BLOOD PRESSURE: 84 MMHG

## 2023-02-24 DIAGNOSIS — Z12.11 SCREENING FOR COLON CANCER: ICD-10-CM

## 2023-02-24 DIAGNOSIS — J44.89 COPD (CHRONIC OBSTRUCTIVE PULMONARY DISEASE) WITH CHRONIC BRONCHITIS (HCC): ICD-10-CM

## 2023-02-24 DIAGNOSIS — F17.210 TOBACCO DEPENDENCE DUE TO CIGARETTES: ICD-10-CM

## 2023-02-24 DIAGNOSIS — F41.1 GENERALIZED ANXIETY DISORDER: ICD-10-CM

## 2023-02-24 DIAGNOSIS — I10 ESSENTIAL HYPERTENSION: ICD-10-CM

## 2023-02-24 DIAGNOSIS — I77.1 SUBCLAVIAN ARTERIAL STENOSIS (HCC): ICD-10-CM

## 2023-02-24 DIAGNOSIS — M79.622 LEFT UPPER ARM PAIN: ICD-10-CM

## 2023-02-24 PROCEDURE — 99214 OFFICE O/P EST MOD 30 MIN: CPT | Performed by: FAMILY MEDICINE

## 2023-02-24 RX ORDER — TIOTROPIUM BROMIDE INHALATION SPRAY 3.12 UG/1
5 SPRAY, METERED RESPIRATORY (INHALATION) DAILY
Qty: 4 G | Refills: 11 | Status: SHIPPED | OUTPATIENT
Start: 2023-02-24 | End: 2023-09-20 | Stop reason: SDUPTHER

## 2023-02-24 RX ORDER — ERGOCALCIFEROL 1.25 MG/1
50000 CAPSULE ORAL
Qty: 12 CAPSULE | Refills: 0 | Status: SHIPPED | OUTPATIENT
Start: 2023-02-24 | End: 2023-06-02

## 2023-02-24 RX ORDER — LISINOPRIL AND HYDROCHLOROTHIAZIDE 12.5; 1 MG/1; MG/1
1 TABLET ORAL DAILY
Qty: 90 TABLET | Refills: 3 | Status: SHIPPED | OUTPATIENT
Start: 2023-02-24 | End: 2023-12-12

## 2023-02-24 RX ORDER — VARENICLINE TARTRATE 1 MG/1
TABLET, FILM COATED ORAL
Qty: 60 TABLET | Refills: 6 | Status: SHIPPED | OUTPATIENT
Start: 2023-02-24 | End: 2024-03-05

## 2023-02-24 RX ORDER — ALBUTEROL SULFATE 90 UG/1
2 AEROSOL, METERED RESPIRATORY (INHALATION) EVERY 6 HOURS PRN
Qty: 18 G | Refills: 6 | Status: SHIPPED | OUTPATIENT
Start: 2023-02-24

## 2023-02-24 RX ORDER — FLUTICASONE PROPIONATE AND SALMETEROL XINAFOATE 230; 21 UG/1; UG/1
2 AEROSOL, METERED RESPIRATORY (INHALATION) 2 TIMES DAILY
Qty: 12 G | Refills: 6 | Status: SHIPPED | OUTPATIENT
Start: 2023-02-24 | End: 2023-09-20 | Stop reason: SDUPTHER

## 2023-02-24 NOTE — ASSESSMENT & PLAN NOTE
Continues on 2-3 L continuous oxygen supplementation  She is on advair, spiriva, albuterol inhaler  Working on quitting smoking with chantix.   New rxs provided.

## 2023-02-24 NOTE — ASSESSMENT & PLAN NOTE
Continues on lexapro 20 mg daily  Not currently wanting to see a therapist.   Has coping skills for anxiety.   No recent panic attacks.

## 2023-02-25 NOTE — ASSESSMENT & PLAN NOTE
She continues on atorvastatin 20 mg  Working on quitting smoking.   Has left arm pain increasing   Mild stenosis of the right internal carotid artery (<50%).    Moderate stenosis of the left internal carotid artery (50-69%).    Bidirectional flow in the vertebral artery indicating LEFT subclavian    steal.    Elevated velocities in the proximal subclavian artery consistent with >50%    stenosis.     Referral back to vascular surgery provided.   Continue chantix and stop smoking  Start asa 81 mg, has no hx of GI bleed, ulcers, gastric surgery

## 2023-02-25 NOTE — PROGRESS NOTES
Subjective:   Kristel Sewell is a 62 y.o. female here today for evaluation and management of:     COPD (chronic obstructive pulmonary disease) with chronic bronchitis (HCC)  Continues on 2-3 L continuous oxygen supplementation  She is on advair, spiriva, albuterol inhaler  Working on quitting smoking with chantix.   New rxs provided.       Generalized anxiety disorder  Continues on lexapro 20 mg daily  Not currently wanting to see a therapist.   Has coping skills for anxiety.   No recent panic attacks.     Subclavian arterial stenosis (HCC)  She continues on atorvastatin 20 mg  Working on quitting smoking.   Has left arm pain increasing   Mild stenosis of the right internal carotid artery (<50%).    Moderate stenosis of the left internal carotid artery (50-69%).    Bidirectional flow in the vertebral artery indicating LEFT subclavian    steal.    Elevated velocities in the proximal subclavian artery consistent with >50%    stenosis.     Referral back to vascular surgery provided.   Continue chantix and stop smoking  Start asa 81 mg, has no hx of GI bleed, ulcers, gastric surgery             Current medicines (including changes today)  Current Outpatient Medications   Medication Sig Dispense Refill    tiotropium (SPIRIVA RESPIMAT) 2.5 mcg/Act Aero Soln Inhale 2 Inhalation every day. 4 g 11    varenicline (CHANTIX) 1 MG tablet One tablet daily as maintainence 60 Tablet 6    albuterol (PROAIR HFA) 108 (90 Base) MCG/ACT Aero Soln inhalation aerosol Inhale 2 Puffs every 6 hours as needed for Shortness of Breath. 18 g 6    lisinopril-hydrochlorothiazide (PRINZIDE) 10-12.5 MG per tablet Take 1 Tablet by mouth every day. 90 Tablet 3    fluticasone-salmeterol (ADVAIR HFA) 230-21 MCG/ACT inhaler Inhale 2 Puffs 2 times a day. 12 g 6    vitamin D2, Ergocalciferol, (DRISDOL) 1.25 MG (90648 UT) Cap capsule Take 1 Capsule by mouth every 7 days for 12 doses. Then take over the counter 5000 units of vitamin D daily. 12 Capsule  0    aspirin EC (ECOTRIN) 81 MG Tablet Delayed Response Take 1 Tablet by mouth every day. 100 Tablet 3    naproxen (NAPROSYN) 500 MG Tab Take 1 Tablet by mouth 2 times a day with meals. 60 Tablet 0    escitalopram (LEXAPRO) 20 MG tablet TAKE 1 TABLET DAILY 90 Tablet 3    ipratropium-albuterol (DUONEB) 0.5-2.5 (3) MG/3ML nebulizer solution Take 3 mL by nebulization every four hours as needed for Shortness of Breath. 120 mL 11    atorvastatin (LIPITOR) 20 MG Tab Take 1 Tablet by mouth every evening. 90 Tablet 3    amLODIPine (NORVASC) 5 MG Tab Take 1 Tablet by mouth every day. 90 Tablet 3    hydrOXYzine HCl (ATARAX) 25 MG Tab Take 1 tablet by mouth 3 times a day as needed for Anxiety. 30 tablet 0     No current facility-administered medications for this visit.     She  has a past medical history of COPD (chronic obstructive pulmonary disease) ([facility]), Essential hypertension (5/19/2021), GILL (generalized anxiety disorder) (5/19/2021), GERD (gastroesophageal reflux disease), Mild episode of recurrent major depressive disorder ([facility]) (5/19/2021), Mixed dyslipidemia (6/2/2021), and Tobacco dependence due to cigarettes.    She has no past medical history of Anemia, Arthritis, Asthma, Cancer ([facility]), Diabetes ([facility]), Heart attack ([facility]), Heart murmur, HIV (human immunodeficiency virus infection) ([facility]), Kidney disease, Seizure ([facility]), or Stroke ([facility]).    ROS  No chest pain, no shortness of breath, no abdominal pain       Objective:     /84 (BP Location: Left arm, Patient Position: Sitting, BP Cuff Size: Adult)   Pulse 87   SpO2 90%  There is no height or weight on file to calculate BMI.   Physical Exam:  Constitutional: Alert, no distress.  Skin: Warm, dry, good turgor, no rashes in visible areas.  Eye: Equal, round and reactive, conjunctiva clear, lids normal.  ENMT: Lips without lesions, good dentition, oropharynx clear.  Neck: Trachea midline, no masses, no thyromegaly. No cervical or supraclavicular  lymphadenopathy  Respiratory: Unlabored respiratory effort, lungs clear to auscultation, no wheezes, no ronchi.  Cardiovascular: Normal S1, S2, no murmur, no edema.  Abdomen: Soft, non-tender, no masses, no hepatosplenomegaly.  Psych: Alert and oriented x3, normal affect and mood.        Assessment and Plan:   The following treatment plan was discussed    1. Screening for colon cancer  - Referral to GI for Colonoscopy    2. COPD (chronic obstructive pulmonary disease) with chronic bronchitis (HCC)  - tiotropium (SPIRIVA RESPIMAT) 2.5 mcg/Act Aero Soln; Inhale 2 Inhalation every day.  Dispense: 4 g; Refill: 11  - albuterol (PROAIR HFA) 108 (90 Base) MCG/ACT Aero Soln inhalation aerosol; Inhale 2 Puffs every 6 hours as needed for Shortness of Breath.  Dispense: 18 g; Refill: 6  - fluticasone-salmeterol (ADVAIR HFA) 230-21 MCG/ACT inhaler; Inhale 2 Puffs 2 times a day.  Dispense: 12 g; Refill: 6    3. Tobacco dependence due to cigarettes  - varenicline (CHANTIX) 1 MG tablet; One tablet daily as maintainence  Dispense: 60 Tablet; Refill: 6    4. Essential hypertension  - lisinopril-hydrochlorothiazide (PRINZIDE) 10-12.5 MG per tablet; Take 1 Tablet by mouth every day.  Dispense: 90 Tablet; Refill: 3    5. Generalized anxiety disorder    6. Left upper arm pain  - Referral to Vascular Surgery    7. Subclavian arterial stenosis (HCC)  - Referral to Vascular Surgery    Other orders  - vitamin D2, Ergocalciferol, (DRISDOL) 1.25 MG (08768 UT) Cap capsule; Take 1 Capsule by mouth every 7 days for 12 doses. Then take over the counter 5000 units of vitamin D daily.  Dispense: 12 Capsule; Refill: 0  - Obtain Results: Other (see comment) (consult note); Obtain Results From: Other (see comment) (Dr. Babb, [facility])  - aspirin EC (ECOTRIN) 81 MG Tablet Delayed Response; Take 1 Tablet by mouth every day.  Dispense: 100 Tablet; Refill: 3      Followup: Return in about 4 months (around 6/24/2023) for copd, vascular  disease.

## 2023-03-14 ENCOUNTER — OFFICE VISIT (OUTPATIENT)
Dept: VASCULAR SURGERY | Facility: MEDICAL CENTER | Age: 63
End: 2023-03-14
Payer: COMMERCIAL

## 2023-03-14 VITALS
SYSTOLIC BLOOD PRESSURE: 132 MMHG | HEART RATE: 85 BPM | BODY MASS INDEX: 30.02 KG/M2 | TEMPERATURE: 97.4 F | DIASTOLIC BLOOD PRESSURE: 72 MMHG | WEIGHT: 158.9 LBS | OXYGEN SATURATION: 94 %

## 2023-03-14 DIAGNOSIS — I65.21 CAROTID STENOSIS, ASYMPTOMATIC, RIGHT: ICD-10-CM

## 2023-03-14 DIAGNOSIS — I77.1 SUBCLAVIAN ARTERIAL STENOSIS (HCC): ICD-10-CM

## 2023-03-14 DIAGNOSIS — E78.5 HYPERLIPIDEMIA, UNSPECIFIED HYPERLIPIDEMIA TYPE: ICD-10-CM

## 2023-03-14 DIAGNOSIS — I10 PRIMARY HYPERTENSION: ICD-10-CM

## 2023-03-14 DIAGNOSIS — J44.9 CHRONIC OBSTRUCTIVE PULMONARY DISEASE, UNSPECIFIED COPD TYPE (HCC): ICD-10-CM

## 2023-03-14 DIAGNOSIS — Z72.0 TOBACCO USE: ICD-10-CM

## 2023-03-14 PROCEDURE — 99204 OFFICE O/P NEW MOD 45 MIN: CPT | Performed by: SURGERY

## 2023-03-14 ASSESSMENT — FIBROSIS 4 INDEX: FIB4 SCORE: 1.38

## 2023-03-14 NOTE — PROGRESS NOTES
VASCULAR SURGERY SERVICE  CONSULT NOTE      Date: 3/14/2023    Referring Provider: Chiqui Zavala MD    Consulting Physician: Kerwin Cho MD - [facility]     -------------------------------------------------------------------------------------------------    Reason for consultation:  Left subclavian artery stenosis.    HPI:  This is a pleasant 62 years old left-handed female with multiple medical problems, severe COPD on 4 to 5 L oxygen, who on recent carotid duplex was found to have less than 50% right internal carotid artery stenosis, 50 to 69% left internal carotid artery stenosis, and left subclavian stenosis with bidirectional flow seen in the left vertebral artery.  Patient is referred to see vascular service.  She complains of left arm pain with when she tries to raise her left arm.  She denies history of stroke or transient ischemic attack.    Past Medical History:   Diagnosis Date    COPD (chronic obstructive pulmonary disease) ([facility])     Essential hypertension 5/19/2021    GILL (generalized anxiety disorder) 5/19/2021    GERD (gastroesophageal reflux disease)     Mild episode of recurrent major depressive disorder ([facility]) 5/19/2021    Mixed dyslipidemia 6/2/2021    Tobacco dependence due to cigarettes        No past surgical history on file.    Current Outpatient Medications   Medication Sig Dispense Refill    tiotropium (SPIRIVA RESPIMAT) 2.5 mcg/Act Aero Soln Inhale 2 Inhalation every day. 4 g 11    varenicline (CHANTIX) 1 MG tablet One tablet daily as maintainence 60 Tablet 6    albuterol (PROAIR HFA) 108 (90 Base) MCG/ACT Aero Soln inhalation aerosol Inhale 2 Puffs every 6 hours as needed for Shortness of Breath. 18 g 6    lisinopril-hydrochlorothiazide (PRINZIDE) 10-12.5 MG per tablet Take 1 Tablet by mouth every day. 90 Tablet 3    fluticasone-salmeterol (ADVAIR HFA) 230-21 MCG/ACT inhaler Inhale 2 Puffs 2 times a day. 12 g 6    vitamin D2, Ergocalciferol, (DRISDOL) 1.25 MG (93175 UT) Cap  capsule Take 1 Capsule by mouth every 7 days for 12 doses. Then take over the counter 5000 units of vitamin D daily. 12 Capsule 0    aspirin EC (ECOTRIN) 81 MG Tablet Delayed Response Take 1 Tablet by mouth every day. 100 Tablet 3    naproxen (NAPROSYN) 500 MG Tab Take 1 Tablet by mouth 2 times a day with meals. 60 Tablet 0    escitalopram (LEXAPRO) 20 MG tablet TAKE 1 TABLET DAILY 90 Tablet 3    ipratropium-albuterol (DUONEB) 0.5-2.5 (3) MG/3ML nebulizer solution Take 3 mL by nebulization every four hours as needed for Shortness of Breath. 120 mL 11    atorvastatin (LIPITOR) 20 MG Tab Take 1 Tablet by mouth every evening. 90 Tablet 3    amLODIPine (NORVASC) 5 MG Tab Take 1 Tablet by mouth every day. 90 Tablet 3    hydrOXYzine HCl (ATARAX) 25 MG Tab Take 1 tablet by mouth 3 times a day as needed for Anxiety. 30 tablet 0     No current facility-administered medications for this visit.       Social History     Socioeconomic History    Marital status:      Spouse name: Not on file    Number of children: Not on file    Years of education: Not on file    Highest education level: Not on file   Occupational History    Not on file   Tobacco Use    Smoking status: Some Days     Packs/day: 0.50     Years: 40.00     Pack years: 20.00     Types: Cigarettes     Passive exposure: Past    Smokeless tobacco: Never   Vaping Use    Vaping Use: Never used   Substance and Sexual Activity    Alcohol use: Yes     Alcohol/week: 12.6 - 14.4 oz     Types: 21 - 24 Cans of beer per week     Comment: 4 beers per day    Drug use: Not Currently    Sexual activity: Yes     Partners: Male   Other Topics Concern    Not on file   Social History Narrative    Not on file     Social Determinants of Health     Financial Resource Strain: Not on file   Food Insecurity: Not on file   Transportation Needs: Not on file   Physical Activity: Not on file   Stress: Not on file   Social Connections: Not on file   Intimate Partner Violence: Not on file    Housing Stability: Not on file       Family History   Problem Relation Age of Onset    Kidney Disease Mother     Heart Disease Mother     Stroke Mother     Diabetes Mother     Diabetes Father     Kidney Disease Father     Arrythmia Father        Allergies:  Penicillins    Review of Systems:    Constitutional: Negative for fever, chills, weight loss,   HENT:   Negative for hearing loss or tinnitus    Eyes:    Negative for blurred vision, double vision, or loss of vision  Respiratory:  Shortness of breath on exertion  Cardiac:  Negative for chest pain or palpitations or orthopnea  Vascular:  Negative for claudication or rest pain   Gastrointestinal: Negative for nausea, vomiting, or abdominal pain     Negative for hematochezia or melena   Genitourinary: Negative for dysuria, frequency, or hematuria   Musculoskeletal: Left upper arm/shoulder pain with raising left arm  Skin:   Negative for itching or rash  Neurological:  Negative for dizziness, headaches, or tremors     Negative for speech disturbance     Negative for extremity weakness or paresthesias  Endo/Heme:  Negative for easy bruising or bleeding  Psychiatric:  Negative for depression, suicidal ideas, or hallucinations    Physical Exam:  /72   Pulse 85   Temp 36.3 °C (97.4 °F)   Wt 72.1 kg (158 lb 14.4 oz)   SpO2 94%     Constitutional: Well-developed, well-nourished female on oxygen with strong tobacco smell  HEENT:  Normocephalic and atraumatic, EOMI.+ Bruits.  Neck:   Supple, no JVD  Cardiovascular: Regular rate and rhythm  Pulmonary:  Distant breath sounds  Abdominal:  Soft, non-tender, non-distended     Aortic impulse not widened  Musculoskeletal: No edema, no tenderness  Neurological:  CN II-XII grossly intact, no focal deficits  Skin:   Skin is warm and dry. No rash noted.  Psychiatric:  Normal mood and affect.  Upper extremities:    Palpable bilateral radial pulses, right stronger than left.  Lower extremities:    Feet are warm,  well-perfused.      Radiology:  I reviewed the carotid duplex images.    Assessment:  -Left subclavian artery stenosis, asymptomatic.  -Left shoulder and upper arm pain with range of motion.  -Tobacco use.  -COPD on oxygen.  -Hypertension.  -Hyperlipidemia.    Plan:  I had a long discussion with patient and her family members.  Carotid duplex results were reviewed with them.    With regard to her carotid stenosis, since the degree of stenosis is less than 70% and patient is asymptomatic, no invasive intervention is indicated at this time.    With regard to her left subclavian arterial stenosis, her symptoms of left upper arm and shoulder pain with left arm raising is not consistent with arm claudication and more consistent with muscular skeletal problem and therefore, invasive intervention for the left carotid artery left subclavian arterial stenosis is not indicated.  I asked patient to follow-up with her primary care provider for work-up of her left shoulder and upper arm pain with range of motion.  I strongly counseled patient to stop smoking to prevent progression of disease.  Patient knows to always have her blood pressure checked in the right arm and not the left arm.    I ordered follow-up carotid duplex to be done in 6 months and asked patient to see me after the study is done.  Patient and her family members know to call 911 and be taken to the emergency room room if she developed any neurological symptom at any time.  They were instructed on the signs and symptoms to look out for.  They indicated understanding.  All questions were answered.          Kerwni Cho MD  [facility]   [place] preferred or call my office [phone]  __________________________________________________________________  Patient:Kristel Sewell   MRN:[id number]   CSN:[id number]

## 2023-03-27 ENCOUNTER — OFFICE VISIT (OUTPATIENT)
Dept: SLEEP MEDICINE | Facility: MEDICAL CENTER | Age: 63
End: 2023-03-27
Attending: INTERNAL MEDICINE
Payer: COMMERCIAL

## 2023-03-27 VITALS
WEIGHT: 155 LBS | SYSTOLIC BLOOD PRESSURE: 134 MMHG | RESPIRATION RATE: 16 BRPM | HEART RATE: 85 BPM | OXYGEN SATURATION: 96 % | BODY MASS INDEX: 27.46 KG/M2 | HEIGHT: 63 IN | DIASTOLIC BLOOD PRESSURE: 70 MMHG

## 2023-03-27 VITALS — HEIGHT: 63 IN | BODY MASS INDEX: 27.46 KG/M2 | WEIGHT: 155 LBS

## 2023-03-27 DIAGNOSIS — J44.9 CHRONIC OBSTRUCTIVE PULMONARY DISEASE, UNSPECIFIED COPD TYPE (HCC): ICD-10-CM

## 2023-03-27 DIAGNOSIS — Z72.0 TOBACCO USE: ICD-10-CM

## 2023-03-27 DIAGNOSIS — J96.11 CHRONIC RESPIRATORY FAILURE WITH HYPOXIA (HCC): ICD-10-CM

## 2023-03-27 PROCEDURE — 99214 OFFICE O/P EST MOD 30 MIN: CPT | Performed by: INTERNAL MEDICINE

## 2023-03-27 PROCEDURE — 94729 DIFFUSING CAPACITY: CPT | Mod: 26 | Performed by: INTERNAL MEDICINE

## 2023-03-27 PROCEDURE — 94726 PLETHYSMOGRAPHY LUNG VOLUMES: CPT | Mod: 26 | Performed by: INTERNAL MEDICINE

## 2023-03-27 PROCEDURE — 99213 OFFICE O/P EST LOW 20 MIN: CPT | Performed by: INTERNAL MEDICINE

## 2023-03-27 PROCEDURE — 94060 EVALUATION OF WHEEZING: CPT | Mod: 26 | Performed by: INTERNAL MEDICINE

## 2023-03-27 ASSESSMENT — ENCOUNTER SYMPTOMS
NECK PAIN: 0
STRIDOR: 0
HEADACHES: 0
MYALGIAS: 0
ORTHOPNEA: 0
VOMITING: 0
CHILLS: 0
COUGH: 0
DIARRHEA: 0
EYE DISCHARGE: 0
BLURRED VISION: 0
WEAKNESS: 0
SPUTUM PRODUCTION: 0
SINUS PAIN: 0
EYE PAIN: 0
CLAUDICATION: 0
PALPITATIONS: 0
DIZZINESS: 0
DIAPHORESIS: 0
FOCAL WEAKNESS: 0
DOUBLE VISION: 0
EYE REDNESS: 0
FALLS: 0
TREMORS: 0
DEPRESSION: 0
HEARTBURN: 0
SORE THROAT: 0
PND: 0
HEMOPTYSIS: 0
WEIGHT LOSS: 0
NAUSEA: 0
CONSTIPATION: 0
SPEECH CHANGE: 0
BACK PAIN: 0
ABDOMINAL PAIN: 0
FEVER: 0
PHOTOPHOBIA: 0
WHEEZING: 0
SHORTNESS OF BREATH: 1

## 2023-03-27 ASSESSMENT — PULMONARY FUNCTION TESTS
FEV1: 0.64
FVC: 1.94
FEV1/FVC: 44
FEV1_LLN: 1.96
FEV1/FVC: 34
FEV1/FVC_PREDICTED: 79
FEV1/FVC_PERCENT_CHANGE: -21
FEV1_PREDICTED: 2.35
FEV1: 0.66
FVC_LLN: 2.48
FVC_PERCENT_PREDICTED: 49
FVC_PREDICTED: 2.97
FEV1/FVC: 44
FEV1/FVC_PERCENT_PREDICTED: 55
FEV1/FVC_PERCENT_PREDICTED: 54
FEV1_PERCENT_PREDICTED: 28
FEV1_PERCENT_CHANGE: 31
FVC_PERCENT_PREDICTED: 65
FEV1/FVC: 34.02
FEV1_PERCENT_PREDICTED: 27
FEV1_PERCENT_CHANGE: 3
FEV1/FVC_PERCENT_PREDICTED: 43
FEV1/FVC_PERCENT_LLN: 66
FVC: 1.47
FEV1/FVC_PERCENT_PREDICTED: 79
FEV1/FVC_PERCENT_CHANGE: 10
FEV1/FVC_PERCENT_PREDICTED: 43

## 2023-03-27 ASSESSMENT — FIBROSIS 4 INDEX
FIB4 SCORE: 1.38
FIB4 SCORE: 1.38

## 2023-03-27 NOTE — PROGRESS NOTES
Chief Complaint   Patient presents with    COPD     Last seen 9/19/22     Results     PFT 3/27/23          HPI: This patient is a 62 y.o. female whom is followed in our clinic for COPD c/b chronic hypoxic respiratory failure last seen by me on 9/19/22. PMHx is significant for HLP, HTN and COPD previously followed at [facility].  She is an active tobacco user roughly 1/2 ppd with 30 pk year hx. She is on advair 230 HFA and spiriva and prn PAULETTE via [facility]. She uses supplemental O2 at 3 LPM pulsed. She has not had exacerbation in the last year. Functionally she is MMRC 2-3. We referred her to virtual pulmonary rehab but they could not contact her using numbers in Epic.  She has had pulmonary nodules in the past but most recent CT from 8/12/22 showed subpleural scarring w/o distinct nodules, stable LAURA calcified granuloma. No LAD. PFTS from 3/2022 show FEV1 of 31% predicted or 0.24 L with + bronchodilator response, mild air trapping and reduced DLCO 60% predicted. Updated PFTs today show worsened FEV1 of 0.66L or 28% post BD, air trapping and reduced DLCO. CTA from 9/2022 showed no lung nodules. Pt has no acute complaints.     Past Medical History:   Diagnosis Date    COPD (chronic obstructive pulmonary disease) (HCC)     Essential hypertension 5/19/2021    GILL (generalized anxiety disorder) 5/19/2021    GERD (gastroesophageal reflux disease)     Mild episode of recurrent major depressive disorder (HCC) 5/19/2021    Mixed dyslipidemia 6/2/2021    Tobacco dependence due to cigarettes        Social History     Socioeconomic History    Marital status:      Spouse name: Not on file    Number of children: Not on file    Years of education: Not on file    Highest education level: Not on file   Occupational History    Not on file   Tobacco Use    Smoking status: Some Days     Packs/day: 0.50     Years: 40.00     Pack years: 20.00     Types: Cigarettes     Passive exposure: Past    Smokeless tobacco: Never   Vaping Use    Vaping  Use: Never used   Substance and Sexual Activity    Alcohol use: Yes     Alcohol/week: 12.6 - 14.4 oz     Types: 21 - 24 Cans of beer per week     Comment: 4 beers per day    Drug use: Not Currently    Sexual activity: Yes     Partners: Male   Other Topics Concern    Not on file   Social History Narrative    Not on file     Social Determinants of Health     Financial Resource Strain: Not on file   Food Insecurity: Not on file   Transportation Needs: Not on file   Physical Activity: Not on file   Stress: Not on file   Social Connections: Not on file   Intimate Partner Violence: Not on file   Housing Stability: Not on file       Family History   Problem Relation Age of Onset    Kidney Disease Mother     Heart Disease Mother     Stroke Mother     Diabetes Mother     Diabetes Father     Kidney Disease Father     Arrythmia Father        Current Outpatient Medications on File Prior to Visit   Medication Sig Dispense Refill    tiotropium (SPIRIVA RESPIMAT) 2.5 mcg/Act Aero Soln Inhale 2 Inhalation every day. 4 g 11    varenicline (CHANTIX) 1 MG tablet One tablet daily as maintainence 60 Tablet 6    albuterol (PROAIR HFA) 108 (90 Base) MCG/ACT Aero Soln inhalation aerosol Inhale 2 Puffs every 6 hours as needed for Shortness of Breath. 18 g 6    lisinopril-hydrochlorothiazide (PRINZIDE) 10-12.5 MG per tablet Take 1 Tablet by mouth every day. 90 Tablet 3    fluticasone-salmeterol (ADVAIR HFA) 230-21 MCG/ACT inhaler Inhale 2 Puffs 2 times a day. 12 g 6    vitamin D2, Ergocalciferol, (DRISDOL) 1.25 MG (58950 UT) Cap capsule Take 1 Capsule by mouth every 7 days for 12 doses. Then take over the counter 5000 units of vitamin D daily. 12 Capsule 0    aspirin EC (ECOTRIN) 81 MG Tablet Delayed Response Take 1 Tablet by mouth every day. 100 Tablet 3    naproxen (NAPROSYN) 500 MG Tab Take 1 Tablet by mouth 2 times a day with meals. 60 Tablet 0    escitalopram (LEXAPRO) 20 MG tablet TAKE 1 TABLET DAILY 90 Tablet 3     "ipratropium-albuterol (DUONEB) 0.5-2.5 (3) MG/3ML nebulizer solution Take 3 mL by nebulization every four hours as needed for Shortness of Breath. 120 mL 11    atorvastatin (LIPITOR) 20 MG Tab Take 1 Tablet by mouth every evening. 90 Tablet 3    amLODIPine (NORVASC) 5 MG Tab Take 1 Tablet by mouth every day. 90 Tablet 3    hydrOXYzine HCl (ATARAX) 25 MG Tab Take 1 tablet by mouth 3 times a day as needed for Anxiety. 30 tablet 0     No current facility-administered medications on file prior to visit.       Penicillins      ROS:   Review of Systems   Constitutional:  Negative for chills, diaphoresis, fever, malaise/fatigue and weight loss.   HENT:  Negative for congestion, ear discharge, ear pain, hearing loss, nosebleeds, sinus pain, sore throat and tinnitus.    Eyes:  Negative for blurred vision, double vision, photophobia, pain, discharge and redness.   Respiratory:  Positive for shortness of breath. Negative for cough, hemoptysis, sputum production, wheezing and stridor.    Cardiovascular:  Negative for chest pain, palpitations, orthopnea, claudication, leg swelling and PND.   Gastrointestinal:  Negative for abdominal pain, constipation, diarrhea, heartburn, nausea and vomiting.   Genitourinary:  Negative for dysuria and urgency.   Musculoskeletal:  Negative for back pain, falls, joint pain, myalgias and neck pain.   Skin:  Negative for itching and rash.   Neurological:  Negative for dizziness, tremors, speech change, focal weakness, weakness and headaches.   Endo/Heme/Allergies:  Negative for environmental allergies.   Psychiatric/Behavioral:  Negative for depression.      /70 (BP Location: Right arm, Patient Position: Sitting, BP Cuff Size: Adult)   Pulse 85   Resp 16   Ht 1.588 m (5' 2.5\")   Wt 70.3 kg (155 lb)   SpO2 96%   Physical Exam  Constitutional:       General: She is not in acute distress.     Appearance: Normal appearance. She is well-developed and normal weight.   HENT:      Head: " Normocephalic and atraumatic.      Right Ear: External ear normal.      Left Ear: External ear normal.      Nose: Nose normal. No congestion.      Mouth/Throat:      Mouth: Mucous membranes are moist.      Pharynx: Oropharynx is clear. No oropharyngeal exudate.   Eyes:      General: No scleral icterus.     Extraocular Movements: Extraocular movements intact.      Conjunctiva/sclera: Conjunctivae normal.      Pupils: Pupils are equal, round, and reactive to light.   Neck:      Vascular: No JVD.      Trachea: No tracheal deviation.   Cardiovascular:      Rate and Rhythm: Normal rate and regular rhythm.      Heart sounds: Normal heart sounds. No murmur heard.    No friction rub. No gallop.   Pulmonary:      Effort: Pulmonary effort is normal. No accessory muscle usage or respiratory distress.      Breath sounds: No wheezing or rales.      Comments: Decreased air movement throughout  Abdominal:      General: There is no distension.      Palpations: Abdomen is soft.      Tenderness: There is no abdominal tenderness.   Musculoskeletal:         General: No tenderness or deformity. Normal range of motion.      Cervical back: Normal range of motion and neck supple.      Right lower leg: No edema.      Left lower leg: No edema.   Lymphadenopathy:      Cervical: No cervical adenopathy.   Skin:     General: Skin is warm and dry.      Findings: No rash.      Nails: There is no clubbing.   Neurological:      Mental Status: She is alert and oriented to person, place, and time.      Cranial Nerves: No cranial nerve deficit.      Gait: Gait normal.   Psychiatric:         Behavior: Behavior normal.       PFTs as reviewed by me personally: as per hPI    Imaging as reviewed by me personally:  as per hPI    Assessment:  1. Tobacco use  CT-LUNG CANCER-SCREENING      2. Chronic obstructive pulmonary disease, unspecified COPD type (HCC)        3. Chronic respiratory failure with hypoxia (HCC)            Plan:  Counseled on complete tobacco  cessation. She has no concerning nodules; due for routine lung Ca screening 9/2023.   Chronic, severe and progressive. She was counseled on tobacco cessation. Continue ICS/LABA, LAMA, O2 and prn PAULETTE. She was provided number for pulmonary rehab to enroll in virtual program  Chronic and 2/2 COPD. O2 needs are stable. Pt is compliant with and benefiting from O2 at 3lPM.   Return in about 6 months (around 9/27/2023) for CT lung ca screening.

## 2023-03-27 NOTE — PROCEDURES
Technician: NELSON Sorensen    Technician Comment:  Good patient effort & cooperation.  The results of this test meet the ATS/ERS standards for acceptability & reproducibility.  Test was performed on the [employer] Body Plethysmograph-Elite DX system.  Predicted values were GLI-2012 for spirometry, GLI-2017 for DLCO, ITS for Lung Volumes.  The DLCO was uncorrected for Hgb.  A bronchodilator of Ventolin HFA -2puffs via spacer administered.  DLCO performed during dilation period.    Interpretation:   Baseline spirometry shows airflow obstruction with an FEV1/FVC ratio 44 and an FEV1 of 0.64 L or 27% predicted.  There is significant bronchodilator response based on improvement in FVC C.  Total capacity is normal at 115% predicted.  There is air trapping with residual volume of 194% predicted.  Diffusion capacity is reduced at 68% predicted.  Pulmonary function testing shows evidence of advanced airflow obstruction.

## 2023-05-09 ENCOUNTER — OFFICE VISIT (OUTPATIENT)
Dept: URGENT CARE | Facility: PHYSICIAN GROUP | Age: 63
End: 2023-05-09
Payer: COMMERCIAL

## 2023-05-09 VITALS
OXYGEN SATURATION: 91 % | WEIGHT: 154 LBS | BODY MASS INDEX: 28.34 KG/M2 | SYSTOLIC BLOOD PRESSURE: 124 MMHG | TEMPERATURE: 97.8 F | RESPIRATION RATE: 16 BRPM | HEART RATE: 87 BPM | DIASTOLIC BLOOD PRESSURE: 84 MMHG | HEIGHT: 62 IN

## 2023-05-09 DIAGNOSIS — N61.1 BREAST ABSCESS: ICD-10-CM

## 2023-05-09 PROCEDURE — 99214 OFFICE O/P EST MOD 30 MIN: CPT | Performed by: PHYSICIAN ASSISTANT

## 2023-05-09 RX ORDER — SULFAMETHOXAZOLE AND TRIMETHOPRIM 800; 160 MG/1; MG/1
1 TABLET ORAL 2 TIMES DAILY
Qty: 14 TABLET | Refills: 0 | Status: SHIPPED | OUTPATIENT
Start: 2023-05-09 | End: 2023-05-16

## 2023-05-09 ASSESSMENT — ENCOUNTER SYMPTOMS
ROS SKIN COMMENTS: RIGHT BREAST ABSCESS
MYALGIAS: 0
CHILLS: 0
FEVER: 0

## 2023-05-09 ASSESSMENT — FIBROSIS 4 INDEX: FIB4 SCORE: 1.38

## 2023-05-09 NOTE — PROGRESS NOTES
Subjective:   Kristel Sewell is a 62 y.o. female who presents today with   Chief Complaint   Patient presents with    Bump     X 2-3 days, (R) breast. Pt states everyday it has gotten sore and red.      Other  This is a new problem. Episode onset: 3 days. The problem occurs constantly. The problem has been gradually worsening. Pertinent negatives include no chest pain, chills, fever or myalgias. She has tried nothing for the symptoms. The treatment provided no relief.     Patient states she noticed an abscess or bite under her right breast that has been progressively getting worse over the last couple of days.    Patient's daughter is present today.    PMH:  has a past medical history of COPD (chronic obstructive pulmonary disease) ([facility]), Essential hypertension (5/19/2021), GILL (generalized anxiety disorder) (5/19/2021), GERD (gastroesophageal reflux disease), Mild episode of recurrent major depressive disorder (HCC) (5/19/2021), Mixed dyslipidemia (6/2/2021), and Tobacco dependence due to cigarettes.    She has no past medical history of Anemia, Arthritis, Asthma, Cancer ([facility]), Diabetes ([facility]), Heart attack ([facility]), Heart murmur, HIV (human immunodeficiency virus infection) ([facility]), Kidney disease, Seizure ([facility]), or Stroke ([facility]).  MEDS:   Current Outpatient Medications:     sulfamethoxazole-trimethoprim (BACTRIM DS) 800-160 MG tablet, Take 1 Tablet by mouth 2 times a day for 7 days., Disp: 14 Tablet, Rfl: 0    tiotropium (SPIRIVA RESPIMAT) 2.5 mcg/Act Aero Soln, Inhale 2 Inhalation every day., Disp: 4 g, Rfl: 11    varenicline (CHANTIX) 1 MG tablet, One tablet daily as maintainence, Disp: 60 Tablet, Rfl: 6    albuterol (PROAIR HFA) 108 (90 Base) MCG/ACT Aero Soln inhalation aerosol, Inhale 2 Puffs every 6 hours as needed for Shortness of Breath., Disp: 18 g, Rfl: 6    lisinopril-hydrochlorothiazide (PRINZIDE) 10-12.5 MG per tablet, Take 1 Tablet by mouth every day., Disp: 90 Tablet, Rfl: 3     "fluticasone-salmeterol (ADVAIR HFA) 230-21 MCG/ACT inhaler, Inhale 2 Puffs 2 times a day., Disp: 12 g, Rfl: 6    vitamin D2, Ergocalciferol, (DRISDOL) 1.25 MG (30673 UT) Cap capsule, Take 1 Capsule by mouth every 7 days for 12 doses. Then take over the counter 5000 units of vitamin D daily., Disp: 12 Capsule, Rfl: 0    aspirin EC (ECOTRIN) 81 MG Tablet Delayed Response, Take 1 Tablet by mouth every day., Disp: 100 Tablet, Rfl: 3    naproxen (NAPROSYN) 500 MG Tab, Take 1 Tablet by mouth 2 times a day with meals., Disp: 60 Tablet, Rfl: 0    escitalopram (LEXAPRO) 20 MG tablet, TAKE 1 TABLET DAILY, Disp: 90 Tablet, Rfl: 3    ipratropium-albuterol (DUONEB) 0.5-2.5 (3) MG/3ML nebulizer solution, Take 3 mL by nebulization every four hours as needed for Shortness of Breath., Disp: 120 mL, Rfl: 11    atorvastatin (LIPITOR) 20 MG Tab, Take 1 Tablet by mouth every evening., Disp: 90 Tablet, Rfl: 3    amLODIPine (NORVASC) 5 MG Tab, Take 1 Tablet by mouth every day., Disp: 90 Tablet, Rfl: 3    hydrOXYzine HCl (ATARAX) 25 MG Tab, Take 1 tablet by mouth 3 times a day as needed for Anxiety., Disp: 30 tablet, Rfl: 0  ALLERGIES:   Allergies   Allergen Reactions    Penicillins      SURGHX: History reviewed. No pertinent surgical history.  SOCHX:  reports that she has been smoking cigarettes. She has a 20.00 pack-year smoking history. She has been exposed to tobacco smoke. She has never used smokeless tobacco. She reports current alcohol use of about 12.6 - 14.4 oz per week. She reports that she does not currently use drugs.  FH: Reviewed with patient, not pertinent to this visit.     Review of Systems   Constitutional:  Negative for chills and fever.   Cardiovascular:  Negative for chest pain.   Musculoskeletal:  Negative for myalgias.   Skin:         Right breast abscess      Objective:   /84   Pulse 87   Temp 36.6 °C (97.8 °F) (Temporal)   Resp 16   Ht 1.575 m (5' 2\")   Wt 69.9 kg (154 lb)   SpO2 91% Comment: on o2  " BMI 28.17 kg/m²   Physical Exam  Vitals and nursing note reviewed. Exam conducted with a chaperone present.   Constitutional:       General: She is not in acute distress.     Appearance: Normal appearance. She is well-developed. She is not ill-appearing or toxic-appearing.   HENT:      Head: Normocephalic and atraumatic.      Right Ear: Hearing normal.      Left Ear: Hearing normal.   Cardiovascular:      Rate and Rhythm: Normal rate.   Pulmonary:      Effort: Pulmonary effort is normal.   Musculoskeletal:      Comments: Normal movement in all 4 extremities   Skin:     General: Skin is warm and dry.             Comments: Erythematous abscess with approximate diameter of 2 to 3 cm under the right areola/right breast.  Patient notes tenderness to palpation when she palpates the area.  She denies any other masses or bumps.  No drainage or discharge.  No signs of necrosis.  Appears to be well contained to the area and no erythema or streaking from the site through the breast.   Neurological:      Mental Status: She is alert.      Coordination: Coordination normal.   Psychiatric:         Mood and Affect: Mood normal.         Assessment/Plan:   Assessment    1. Breast abscess  - sulfamethoxazole-trimethoprim (BACTRIM DS) 800-160 MG tablet; Take 1 Tablet by mouth 2 times a day for 7 days.  Dispense: 14 Tablet; Refill: 0    Symptoms and presentation are consistent with breast abscess of the right breast.  Would recommend warm compress to the area and use of antibiotics at this time.  Discussed with her and her daughter that I would recommend close monitoring and low threshold for ER precautions if no improvement in the next 24 to 48 hours or especially with any worsening in the meantime.  No indication for drainage at this time but if any worsening would suggest going into the ER for drainage at that time.  Abscess appears to be localized and superficial with no other spreading throughout the right breast.  No fever or  flulike symptoms on exam today.    Differential diagnosis, natural history, supportive care, and indications for immediate follow-up discussed.   Patient given instructions and understanding of medications and treatment.    If not improving in 3-5 days, F/U with PCP or return to UC if symptoms worsen.  Strict ER precautions given.  Patient agreeable to plan.      Please note that this dictation was created using voice recognition software. I have made every reasonable attempt to correct obvious errors, but I expect that there are errors of grammar and possibly content that I did not discover before finalizing the note.    Josue Oneal PA-C

## 2023-06-02 RX ORDER — ERGOCALCIFEROL 1.25 MG/1
CAPSULE ORAL
Qty: 12 CAPSULE | Refills: 0 | Status: SHIPPED | OUTPATIENT
Start: 2023-06-02 | End: 2023-08-28

## 2023-06-02 NOTE — TELEPHONE ENCOUNTER
Received request via: Pharmacy    Was the patient seen in the last year in this department? Yes    Does the patient have an active prescription (recently filled or refills available) for medication(s) requested? No    Does the patient have [facility] and need 100 day supply (blood pressure, diabetes and cholesterol meds only)? Patient does not have SCP      Last Office Visit:02/4/2023  Last Labs:01/25/2023

## 2023-06-27 ENCOUNTER — APPOINTMENT (OUTPATIENT)
Dept: MEDICAL GROUP | Facility: PHYSICIAN GROUP | Age: 63
End: 2023-06-27

## 2023-07-07 ENCOUNTER — APPOINTMENT (OUTPATIENT)
Dept: MEDICAL GROUP | Facility: PHYSICIAN GROUP | Age: 63
End: 2023-07-07
Payer: MEDICAID

## 2023-07-31 ENCOUNTER — OFFICE VISIT (OUTPATIENT)
Dept: MEDICAL GROUP | Facility: CLINIC | Age: 63
End: 2023-07-31
Payer: MEDICAID

## 2023-07-31 DIAGNOSIS — E66.9 OBESITY (BMI 30-39.9): ICD-10-CM

## 2023-07-31 DIAGNOSIS — G25.0 BENIGN ESSENTIAL TREMOR: ICD-10-CM

## 2023-07-31 DIAGNOSIS — J44.89 COPD (CHRONIC OBSTRUCTIVE PULMONARY DISEASE) WITH CHRONIC BRONCHITIS (HCC): ICD-10-CM

## 2023-07-31 DIAGNOSIS — Z76.89 ENCOUNTER TO ESTABLISH CARE WITH NEW DOCTOR: ICD-10-CM

## 2023-07-31 DIAGNOSIS — I10 ESSENTIAL HYPERTENSION: ICD-10-CM

## 2023-07-31 DIAGNOSIS — F41.1 GENERALIZED ANXIETY DISORDER: ICD-10-CM

## 2023-07-31 DIAGNOSIS — Z99.81 SUPPLEMENTAL OXYGEN DEPENDENT: ICD-10-CM

## 2023-07-31 DIAGNOSIS — E78.2 MIXED DYSLIPIDEMIA: ICD-10-CM

## 2023-07-31 DIAGNOSIS — F33.0 MILD EPISODE OF RECURRENT MAJOR DEPRESSIVE DISORDER (HCC): ICD-10-CM

## 2023-07-31 PROBLEM — J44.9 CHRONIC OBSTRUCTIVE LUNG DISEASE (HCC): Status: RESOLVED | Noted: 2021-12-07 | Resolved: 2023-07-31

## 2023-07-31 PROBLEM — J44.9 CHRONIC OBSTRUCTIVE LUNG DISEASE (HCC): Status: ACTIVE | Noted: 2021-12-07

## 2023-07-31 PROBLEM — R91.1 LUNG NODULE: Status: ACTIVE | Noted: 2021-12-07

## 2023-07-31 PROCEDURE — 3078F DIAST BP <80 MM HG: CPT | Performed by: PHYSICIAN ASSISTANT

## 2023-07-31 PROCEDURE — 3074F SYST BP LT 130 MM HG: CPT | Performed by: PHYSICIAN ASSISTANT

## 2023-07-31 PROCEDURE — 99214 OFFICE O/P EST MOD 30 MIN: CPT | Performed by: PHYSICIAN ASSISTANT

## 2023-07-31 RX ORDER — ESCITALOPRAM OXALATE 20 MG/1
20 TABLET ORAL DAILY
Qty: 90 TABLET | Refills: 3 | Status: SHIPPED | OUTPATIENT
Start: 2023-07-31

## 2023-07-31 RX ORDER — PROPRANOLOL HCL 60 MG
60 CAPSULE, EXTENDED RELEASE 24HR ORAL DAILY
Qty: 90 CAPSULE | Refills: 3 | Status: SHIPPED | OUTPATIENT
Start: 2023-07-31

## 2023-07-31 RX ORDER — ATORVASTATIN CALCIUM 20 MG/1
20 TABLET, FILM COATED ORAL EVERY EVENING
Qty: 90 TABLET | Refills: 3 | Status: SHIPPED | OUTPATIENT
Start: 2023-07-31

## 2023-07-31 ASSESSMENT — PATIENT HEALTH QUESTIONNAIRE - PHQ9
4. FEELING TIRED OR HAVING LITTLE ENERGY: NEARLY EVERY DAY
6. FEELING BAD ABOUT YOURSELF - OR THAT YOU ARE A FAILURE OR HAVE LET YOURSELF OR YOUR FAMILY DOWN: SEVERAL DAYS
SUM OF ALL RESPONSES TO PHQ QUESTIONS 1-9: 10
5. POOR APPETITE OR OVEREATING: SEVERAL DAYS
SUM OF ALL RESPONSES TO PHQ9 QUESTIONS 1 AND 2: 3
9. THOUGHTS THAT YOU WOULD BE BETTER OFF DEAD, OR OF HURTING YOURSELF: NOT AT ALL
8. MOVING OR SPEAKING SO SLOWLY THAT OTHER PEOPLE COULD HAVE NOTICED. OR THE OPPOSITE, BEING SO FIGETY OR RESTLESS THAT YOU HAVE BEEN MOVING AROUND A LOT MORE THAN USUAL: NOT AT ALL
1. LITTLE INTEREST OR PLEASURE IN DOING THINGS: MORE THAN HALF THE DAYS
2. FEELING DOWN, DEPRESSED, IRRITABLE, OR HOPELESS: SEVERAL DAYS
3. TROUBLE FALLING OR STAYING ASLEEP OR SLEEPING TOO MUCH: NOT AT ALL
7. TROUBLE CONCENTRATING ON THINGS, SUCH AS READING THE NEWSPAPER OR WATCHING TELEVISION: MORE THAN HALF THE DAYS

## 2023-07-31 ASSESSMENT — FIBROSIS 4 INDEX: FIB4 SCORE: 1.38

## 2023-07-31 NOTE — PROGRESS NOTES
Chief Complaint   Patient presents with    Establish Care       HISTORY OF PRESENT ILLNESS: Patient is a 62 y.o. female established patient who presents today to discuss the following issues:    Assessment/Plan  Encounter to establish care with new doctor  Patient here today to establish care. Not currently due for routine fasting labs.   [facility] provider was previous PCP. No medical records requested.    COPD (chronic obstructive pulmonary disease) with chronic bronchitis (HCC)  Chronic condition.  Patient was diagnosed in 2014.  She currently uses Spiriva Respimat 2 puffs daily, Advair 2 puffs twice daily and albuterol as needed.  She is on continuous home oxygen with a home concentrator and portable tanks.  She sees Dr. Nati Benson in pulmonology and [facility] is her oxygen supplier.  She will continue to see Dr. Benson as directed and use all of her medications as prescribed.  Moderately controlled    Supplemental oxygen dependent  Chronic condition.  Originally started out as nighttime oxygen and has progressed to continuous.  She states she is on 3 L at rest and 5-6 L when active.  She is also on 3 L continuous at night.  She states that she does well being able to adjust her oxygen upward when she is out of the house as she gets quite short of breath with ambulation.  Her next appointment with pulmonology is at the end of September.  She will continue to use her oxygen and medication as prescribed and keep all follow-up appointments.    Mixed dyslipidemia  Chronic condition.  Patient is currently on atorvastatin 20 mg each evening.  Her most recent lipid panel shows TChol 162, tri 100, HDL 38, .  This is improved from her levels one year ago. Denies chest pain, shortness of breath or muscle pain. Labs have been checked in past year and are stable on current dose. Will continue prescribed medications as directed.    Controlled    Essential hypertension  Currently treated for hypertension, taking  lisinopril-hydrochlorothiazide 10-12.5 mg daily as prescribed.  Denies chest pain or shortness of breath. Blood pressure in the office is 142/78 in her left arm. Repeat pressure in left arm 126/74. She does not currently need refills.  Controlled    Benign essential tremor  Chronic condition.  Patient states her tremor started about 18 months ago and has gotten progressively worse.  She initially declined medications but is now asking if there is something that will help control the tremor.  We will start her out on propranolol LA 60 mg daily.  She will follow-up in 2 weeks for recheck of her symptoms and medication efficacy.  She does have a history of high blood pressure and was previously on amlodipine but had stopped taking the medication.  It is unlikely that the propranolol will drop her blood pressure to an unsafe level but we have discussed signs and symptoms as well as fall precautions.    Mild episode of recurrent major depressive disorder (HCC)  Chronic condition.  Currently well controlled on escitalopram 20 mg daily.  Denies bothersome side effects.  Request refills.  Controlled      Reviewed risks and benefits of treatment plan. Patient verbally agrees to plan of care.     Patient Active Problem List    Diagnosis Date Noted    Encounter to establish care with new doctor 08/01/2023    Benign essential tremor 08/01/2023    Other headache syndrome 09/27/2022    Left upper arm pain 09/27/2022    Subclavian arterial stenosis (HCC) 09/27/2022    Supplemental oxygen dependent 06/27/2022    Peripheral arterial disease (HCC) 12/08/2021    Gastroesophageal reflux disease 12/08/2021    Gallstone 12/08/2021    Lung nodule 12/07/2021    Hospital discharge follow-up 11/15/2021    Mixed dyslipidemia 06/02/2021    Obesity (BMI 30-39.9) 06/02/2021    Generalized anxiety disorder 05/19/2021    Mild episode of recurrent major depressive disorder (HCC) 05/19/2021    COPD (chronic obstructive pulmonary disease) with  chronic bronchitis (HCC) 05/19/2021    Tobacco dependence due to cigarettes 05/19/2021    Essential hypertension 05/19/2021       Allergies:Penicillins    Current Outpatient Medications   Medication Sig Dispense Refill    atorvastatin (LIPITOR) 20 MG Tab Take 1 Tablet by mouth every evening. 90 Tablet 3    escitalopram (LEXAPRO) 20 MG tablet Take 1 Tablet by mouth every day. 90 Tablet 3    propranolol LA (INDERAL LA) 60 MG CAPSULE SR 24 HR Take 1 Capsule by mouth every day. 90 Capsule 3    vitamin D2, Ergocalciferol, (DRISDOL) 1.25 MG (07790 UT) Cap capsule TAKE 1 CAPSULE BY MOUTH ONCE A WEEK FOR  12  DOSES,  THEN  TAKE  OVER  THE  COUNTER  5000  UNITS  OF  VITAMIN  D  DAILY 12 Capsule 0    tiotropium (SPIRIVA RESPIMAT) 2.5 mcg/Act Aero Soln Inhale 2 Inhalation every day. 4 g 11    varenicline (CHANTIX) 1 MG tablet One tablet daily as maintainence 60 Tablet 6    albuterol (PROAIR HFA) 108 (90 Base) MCG/ACT Aero Soln inhalation aerosol Inhale 2 Puffs every 6 hours as needed for Shortness of Breath. 18 g 6    lisinopril-hydrochlorothiazide (PRINZIDE) 10-12.5 MG per tablet Take 1 Tablet by mouth every day. 90 Tablet 3    fluticasone-salmeterol (ADVAIR HFA) 230-21 MCG/ACT inhaler Inhale 2 Puffs 2 times a day. 12 g 6    aspirin EC (ECOTRIN) 81 MG Tablet Delayed Response Take 1 Tablet by mouth every day. 100 Tablet 3    naproxen (NAPROSYN) 500 MG Tab Take 1 Tablet by mouth 2 times a day with meals. 60 Tablet 0    ipratropium-albuterol (DUONEB) 0.5-2.5 (3) MG/3ML nebulizer solution Take 3 mL by nebulization every four hours as needed for Shortness of Breath. 120 mL 11    hydrOXYzine HCl (ATARAX) 25 MG Tab Take 1 tablet by mouth 3 times a day as needed for Anxiety. 30 tablet 0     No current facility-administered medications for this visit.       Wt Readings from Last 3 Encounters:   07/31/23 72.3 kg (159 lb 7 oz)   05/09/23 69.9 kg (154 lb)   03/27/23 70.3 kg (155 lb)   ]  No LMP recorded. Patient is  "postmenopausal.    Exam:  /74 (BP Location: Left arm, Patient Position: Sitting, BP Cuff Size: Adult)   Pulse 75   Temp 36.1 °C (97 °F) (Temporal)   Resp 20   Ht 1.549 m (5' 1\")   Wt 72.3 kg (159 lb 7 oz)   SpO2 89%  Body mass index is 30.13 kg/m².   General:  Well nourished, well developed female. No apparent distress. Not ill appearing.  Eyes: EOM intact, PERRL, conjunctiva non-injected, sclera non-icteric.  Neck: Supple with no cervical lymphadenopathy, JVD, palpable thyroid nodules or carotid bruits.  Pulmonary: Clear to ausculation bilaterally. Normal effort. No rales, rhonchi, or wheezing.  Cardiovascular: Regular rate and rhythm without murmur, rub or gallop.   Extremities: Warm and well perfused with no edema.  Skin: Intact with no obvious rashes or lesions.  Neuro: Cranial nerves I-XII grossly intact.  Psych: Alert and oriented x 3.  Appropriately dressed. Mood and affect appropriate.    Return in about 4 weeks (around 8/28/2023) for medication management - tremor.  Please note that this dictation was created using voice recognition software. I have made every reasonable attempt to correct obvious errors, but I expect that there are errors of grammar and possibly content that I did not discover before finalizing the note.    "

## 2023-08-01 VITALS
RESPIRATION RATE: 20 BRPM | DIASTOLIC BLOOD PRESSURE: 74 MMHG | WEIGHT: 159.44 LBS | BODY MASS INDEX: 30.1 KG/M2 | TEMPERATURE: 97 F | HEIGHT: 61 IN | SYSTOLIC BLOOD PRESSURE: 126 MMHG | HEART RATE: 75 BPM | OXYGEN SATURATION: 89 %

## 2023-08-01 PROBLEM — G25.0 BENIGN ESSENTIAL TREMOR: Status: ACTIVE | Noted: 2023-08-01

## 2023-08-01 PROBLEM — Z76.89 ENCOUNTER TO ESTABLISH CARE WITH NEW DOCTOR: Status: ACTIVE | Noted: 2023-08-01

## 2023-08-01 NOTE — ASSESSMENT & PLAN NOTE
Chronic condition.  Patient is currently on atorvastatin 20 mg each evening.  Her most recent lipid panel shows TChol 162, tri 100, HDL 38, .  This is improved from her levels one year ago. Denies chest pain, shortness of breath or muscle pain. Labs have been checked in past year and are stable on current dose. Will continue prescribed medications as directed.    Controlled

## 2023-08-01 NOTE — ASSESSMENT & PLAN NOTE
Chronic condition.  Patient states her tremor started about 18 months ago and has gotten progressively worse.  She initially declined medications but is now asking if there is something that will help control the tremor.  We will start her out on propranolol LA 60 mg daily.  She will follow-up in 2 weeks for recheck of her symptoms and medication efficacy.  She does have a history of high blood pressure and was previously on amlodipine but had stopped taking the medication.  It is unlikely that the propranolol will drop her blood pressure to an unsafe level but we have discussed signs and symptoms as well as fall precautions.

## 2023-08-01 NOTE — ASSESSMENT & PLAN NOTE
Currently treated for hypertension, taking lisinopril-hydrochlorothiazide 10-12.5 mg daily as prescribed.  Denies chest pain or shortness of breath. Blood pressure in the office is 142/78 in her left arm. Repeat pressure in left arm 126/74. She does not currently need refills.  Controlled

## 2023-08-01 NOTE — ASSESSMENT & PLAN NOTE
Chronic condition.  Originally started out as nighttime oxygen and has progressed to continuous.  She states she is on 3 L at rest and 5-6 L when active.  She is also on 3 L continuous at night.  She states that she does well being able to adjust her oxygen upward when she is out of the house as she gets quite short of breath with ambulation.  Her next appointment with pulmonology is at the end of September.  She will continue to use her oxygen and medication as prescribed and keep all follow-up appointments.

## 2023-08-01 NOTE — ASSESSMENT & PLAN NOTE
Chronic condition.  Currently well controlled on escitalopram 20 mg daily.  Denies bothersome side effects.  Request refills.  Controlled

## 2023-08-01 NOTE — ASSESSMENT & PLAN NOTE
Patient here today to establish care. Not currently due for routine fasting labs.   [facility] provider was previous PCP. No medical records requested.

## 2023-08-01 NOTE — ASSESSMENT & PLAN NOTE
Chronic condition.  Patient was diagnosed in 2014.  She currently uses Spiriva Respimat 2 puffs daily, Advair 2 puffs twice daily and albuterol as needed.  She is on continuous home oxygen with a home concentrator and portable tanks.  She sees Dr. Nati Benson in pulmonology and [facility] is her oxygen supplier.  She will continue to see Dr. Benson as directed and use all of her medications as prescribed.  Moderately controlled

## 2023-08-28 RX ORDER — ERGOCALCIFEROL 1.25 MG/1
CAPSULE ORAL
Qty: 12 CAPSULE | Refills: 0 | Status: SHIPPED | OUTPATIENT
Start: 2023-08-28

## 2023-08-28 NOTE — TELEPHONE ENCOUNTER
Requested Prescriptions     Pending Prescriptions Disp Refills    vitamin D2, Ergocalciferol, (DRISDOL) 1.25 MG (77155 UT) Cap capsule [Pharmacy Med Name: Vitamin D (Ergocalciferol) 1.25 MG (12248 UT) Oral Capsule] 12 Capsule 0     Sig: TAKE 1 CAPSULE BY MOUTH ONCE A WEEK FOR  12  DOSES,  THEN  TAKE  OVER  THE  COUNTER  VITAMIN  D  5000  UNITS  DAILY  THEREAFTER.        Last office visit: 7/31/23  Last lab: 6/2/23

## 2023-09-20 ENCOUNTER — OFFICE VISIT (OUTPATIENT)
Dept: SLEEP MEDICINE | Facility: MEDICAL CENTER | Age: 63
End: 2023-09-20
Attending: INTERNAL MEDICINE
Payer: MEDICAID

## 2023-09-20 VITALS
BODY MASS INDEX: 30.36 KG/M2 | DIASTOLIC BLOOD PRESSURE: 68 MMHG | SYSTOLIC BLOOD PRESSURE: 162 MMHG | HEIGHT: 62 IN | OXYGEN SATURATION: 96 % | HEART RATE: 59 BPM | WEIGHT: 165 LBS

## 2023-09-20 DIAGNOSIS — J44.9 CHRONIC OBSTRUCTIVE PULMONARY DISEASE, UNSPECIFIED COPD TYPE (HCC): ICD-10-CM

## 2023-09-20 DIAGNOSIS — R91.8 LUNG NODULES: ICD-10-CM

## 2023-09-20 DIAGNOSIS — J96.11 CHRONIC RESPIRATORY FAILURE WITH HYPOXIA (HCC): ICD-10-CM

## 2023-09-20 DIAGNOSIS — Z87.891 FORMER SMOKER: ICD-10-CM

## 2023-09-20 PROCEDURE — 94761 N-INVAS EAR/PLS OXIMETRY MLT: CPT | Performed by: INTERNAL MEDICINE

## 2023-09-20 PROCEDURE — 3077F SYST BP >= 140 MM HG: CPT | Performed by: INTERNAL MEDICINE

## 2023-09-20 PROCEDURE — 99213 OFFICE O/P EST LOW 20 MIN: CPT | Performed by: INTERNAL MEDICINE

## 2023-09-20 PROCEDURE — 3078F DIAST BP <80 MM HG: CPT | Performed by: INTERNAL MEDICINE

## 2023-09-20 PROCEDURE — 99214 OFFICE O/P EST MOD 30 MIN: CPT | Performed by: INTERNAL MEDICINE

## 2023-09-20 RX ORDER — PREDNISONE 10 MG/1
TABLET ORAL
Qty: 18 TABLET | Refills: 0 | Status: SHIPPED | OUTPATIENT
Start: 2023-09-20

## 2023-09-20 RX ORDER — AZITHROMYCIN 250 MG/1
TABLET, FILM COATED ORAL
Qty: 6 TABLET | Refills: 0 | Status: SHIPPED | OUTPATIENT
Start: 2023-09-20

## 2023-09-20 RX ORDER — IPRATROPIUM BROMIDE AND ALBUTEROL SULFATE 2.5; .5 MG/3ML; MG/3ML
3 SOLUTION RESPIRATORY (INHALATION) EVERY 4 HOURS PRN
Qty: 120 ML | Refills: 11 | Status: SHIPPED | OUTPATIENT
Start: 2023-09-20 | End: 2023-09-20

## 2023-09-20 RX ORDER — TIOTROPIUM BROMIDE INHALATION SPRAY 3.12 UG/1
5 SPRAY, METERED RESPIRATORY (INHALATION) DAILY
Qty: 4 G | Refills: 11 | Status: SHIPPED | OUTPATIENT
Start: 2023-09-20 | End: 2023-09-20

## 2023-09-20 RX ORDER — TIOTROPIUM BROMIDE INHALATION SPRAY 3.12 UG/1
5 SPRAY, METERED RESPIRATORY (INHALATION) DAILY
Qty: 4 G | Refills: 11 | Status: SHIPPED | OUTPATIENT
Start: 2023-09-20 | End: 2024-03-14

## 2023-09-20 RX ORDER — FLUTICASONE PROPIONATE AND SALMETEROL XINAFOATE 230; 21 UG/1; UG/1
2 AEROSOL, METERED RESPIRATORY (INHALATION) 2 TIMES DAILY
Qty: 12 G | Refills: 11 | Status: SHIPPED | OUTPATIENT
Start: 2023-09-20 | End: 2023-09-20

## 2023-09-20 RX ORDER — IPRATROPIUM BROMIDE AND ALBUTEROL SULFATE 2.5; .5 MG/3ML; MG/3ML
3 SOLUTION RESPIRATORY (INHALATION) EVERY 4 HOURS PRN
Qty: 120 ML | Refills: 11 | Status: SHIPPED | OUTPATIENT
Start: 2023-09-20

## 2023-09-20 RX ORDER — FLUTICASONE PROPIONATE AND SALMETEROL XINAFOATE 230; 21 UG/1; UG/1
2 AEROSOL, METERED RESPIRATORY (INHALATION) 2 TIMES DAILY
Qty: 12 G | Refills: 11 | Status: SHIPPED | OUTPATIENT
Start: 2023-09-20 | End: 2024-03-14

## 2023-09-20 ASSESSMENT — ENCOUNTER SYMPTOMS
MYALGIAS: 0
DIAPHORESIS: 0
DIZZINESS: 0
CLAUDICATION: 0
DOUBLE VISION: 0
ABDOMINAL PAIN: 0
PND: 0
FOCAL WEAKNESS: 0
PALPITATIONS: 0
DIARRHEA: 0
COUGH: 0
PHOTOPHOBIA: 0
HEMOPTYSIS: 0
TREMORS: 0
BACK PAIN: 0
WHEEZING: 0
EYE PAIN: 0
FALLS: 0
SHORTNESS OF BREATH: 1
BLURRED VISION: 0
SPEECH CHANGE: 0
SINUS PAIN: 0
EYE REDNESS: 0
ORTHOPNEA: 0
VOMITING: 0
STRIDOR: 0
WEAKNESS: 0
NECK PAIN: 0
NAUSEA: 0
DEPRESSION: 0
HEARTBURN: 0
WEIGHT LOSS: 0
CHILLS: 0
SORE THROAT: 0
CONSTIPATION: 0
HEADACHES: 0
SPUTUM PRODUCTION: 0
EYE DISCHARGE: 0
FEVER: 0

## 2023-09-20 ASSESSMENT — FIBROSIS 4 INDEX: FIB4 SCORE: 1.38

## 2023-09-20 NOTE — PROGRESS NOTES
Chief Complaint   Patient presents with    Follow-Up     LAST SEEN 3/27/23         HPI: This patient is a 62 y.o. female whom is followed in our clinic for COPD c/b chronic hypoxemic respiratory failure last seen by me on 3/27/23.  PMHx is significant for HLP, HTN and COPD previously followed at [facility].  She has quit tobacco for the past 30 days and has 30+ pk year hx.  She is on advair 230 HFA and spiriva and prn PAULETTE via [facility]. She uses supplemental O2 at 3 LPM pulsed and up to 5lPM with activity. She has not had exacerbation in the last year. Functionally she is MMRC 2-3. We referred her to virtual pulmonary rehab but they could not contact her using numbers in Epic.  She has had pulmonary nodules in the past but most recent CT from 8/12/22 showed subpleural scarring w/o distinct nodules, stable LAURA calcified granuloma. No LAD. PFTS from our last visit in March showed worsened FEV1 of 0.66L or 28% post BD, air trapping and reduced DLCO. She is due for lung ca screening CT which was ordered last visit but not scheduled. She has been having increased chest congestion with difficulty clearing mucous over the last few days. She has 6 grandchildren at home some of whom have been ill.     Past Medical History:   Diagnosis Date    COPD (chronic obstructive pulmonary disease) (HCC)     Essential hypertension 5/19/2021    GILL (generalized anxiety disorder) 5/19/2021    GERD (gastroesophageal reflux disease)     Mild episode of recurrent major depressive disorder (HCC) 5/19/2021    Mixed dyslipidemia 6/2/2021    Tobacco dependence due to cigarettes        Social History     Socioeconomic History    Marital status:      Spouse name: Jm    Number of children: 2    Years of education: Not on file    Highest education level: High school graduate   Occupational History    Not on file   Tobacco Use    Smoking status: Every Day     Current packs/day: 0.50     Average packs/day: 0.5 packs/day for 40.0 years (20.0 ttl pk-yrs)      Types: Cigarettes     Passive exposure: Past    Smokeless tobacco: Never    Tobacco comments:     5 ciggs a day    Vaping Use    Vaping Use: Never used   Substance and Sexual Activity    Alcohol use: Yes     Alcohol/week: 12.6 - 14.4 oz     Types: 21 - 24 Cans of beer per week     Comment: 4 beers per day    Drug use: Not Currently    Sexual activity: Yes     Partners: Male     Birth control/protection: Post-Menopausal   Other Topics Concern    Not on file   Social History Narrative    Not on file     Social Determinants of Health     Financial Resource Strain: Not on file   Food Insecurity: Not on file   Transportation Needs: Not on file   Physical Activity: Not on file   Stress: Not on file   Social Connections: Not on file   Intimate Partner Violence: Not on file   Housing Stability: Not on file       Family History   Problem Relation Age of Onset    Kidney Disease Mother     Heart Disease Mother     Stroke Mother     Diabetes Mother     Diabetes Father     Kidney Disease Father     Arrythmia Father        Current Outpatient Medications on File Prior to Visit   Medication Sig Dispense Refill    albuterol (PROAIR HFA) 108 (90 Base) MCG/ACT Aero Soln inhalation aerosol Inhale 2 Puffs every 6 hours as needed for Shortness of Breath. 18 g 6    vitamin D2, Ergocalciferol, (DRISDOL) 1.25 MG (79106 UT) Cap capsule TAKE 1 CAPSULE BY MOUTH ONCE A WEEK FOR  12  DOSES,  THEN  TAKE  OVER  THE  COUNTER  VITAMIN  D  5000  UNITS  DAILY  THEREAFTER. 12 Capsule 0    atorvastatin (LIPITOR) 20 MG Tab Take 1 Tablet by mouth every evening. 90 Tablet 3    escitalopram (LEXAPRO) 20 MG tablet Take 1 Tablet by mouth every day. 90 Tablet 3    propranolol LA (INDERAL LA) 60 MG CAPSULE SR 24 HR Take 1 Capsule by mouth every day. 90 Capsule 3    varenicline (CHANTIX) 1 MG tablet One tablet daily as maintainence 60 Tablet 6    lisinopril-hydrochlorothiazide (PRINZIDE) 10-12.5 MG per tablet Take 1 Tablet by mouth every day. 90 Tablet 3     "aspirin EC (ECOTRIN) 81 MG Tablet Delayed Response Take 1 Tablet by mouth every day. 100 Tablet 3    naproxen (NAPROSYN) 500 MG Tab Take 1 Tablet by mouth 2 times a day with meals. 60 Tablet 0    hydrOXYzine HCl (ATARAX) 25 MG Tab Take 1 tablet by mouth 3 times a day as needed for Anxiety. 30 tablet 0     No current facility-administered medications on file prior to visit.       Penicillins      ROS:   Review of Systems   Constitutional:  Negative for chills, diaphoresis, fever, malaise/fatigue and weight loss.   HENT:  Negative for congestion, ear discharge, ear pain, hearing loss, nosebleeds, sinus pain, sore throat and tinnitus.    Eyes:  Negative for blurred vision, double vision, photophobia, pain, discharge and redness.   Respiratory:  Positive for shortness of breath. Negative for cough, hemoptysis, sputum production, wheezing and stridor.         Chest congestion   Cardiovascular:  Negative for chest pain, palpitations, orthopnea, claudication, leg swelling and PND.   Gastrointestinal:  Negative for abdominal pain, constipation, diarrhea, heartburn, nausea and vomiting.   Genitourinary:  Negative for dysuria and urgency.   Musculoskeletal:  Negative for back pain, falls, joint pain, myalgias and neck pain.   Skin:  Negative for itching and rash.   Neurological:  Negative for dizziness, tremors, speech change, focal weakness, weakness and headaches.   Endo/Heme/Allergies:  Negative for environmental allergies.   Psychiatric/Behavioral:  Negative for depression.        BP (!) 162/68 (BP Location: Right arm, Patient Position: Sitting, BP Cuff Size: Adult)   Pulse (!) 59   Ht 1.575 m (5' 2\")   Wt 74.8 kg (165 lb)   SpO2 96%   Physical Exam  Constitutional:       General: She is not in acute distress.     Appearance: Normal appearance. She is well-developed and normal weight.   HENT:      Head: Normocephalic and atraumatic.      Right Ear: External ear normal.      Left Ear: External ear normal.      Nose: " Nose normal. No congestion.      Mouth/Throat:      Mouth: Mucous membranes are moist.      Pharynx: Oropharynx is clear. No oropharyngeal exudate.   Eyes:      General: No scleral icterus.     Extraocular Movements: Extraocular movements intact.      Conjunctiva/sclera: Conjunctivae normal.      Pupils: Pupils are equal, round, and reactive to light.   Neck:      Vascular: No JVD.      Trachea: No tracheal deviation.   Cardiovascular:      Rate and Rhythm: Normal rate and regular rhythm.      Heart sounds: Normal heart sounds. No murmur heard.     No friction rub. No gallop.   Pulmonary:      Effort: Pulmonary effort is normal. No accessory muscle usage or respiratory distress.      Breath sounds: No wheezing or rales.      Comments: Decreased air movement throughout b/l  Abdominal:      General: There is no distension.      Palpations: Abdomen is soft.      Tenderness: There is no abdominal tenderness.   Musculoskeletal:         General: No tenderness or deformity. Normal range of motion.      Cervical back: Normal range of motion and neck supple.      Right lower leg: No edema.      Left lower leg: No edema.   Lymphadenopathy:      Cervical: No cervical adenopathy.   Skin:     General: Skin is warm and dry.      Findings: No rash.      Nails: There is no clubbing.   Neurological:      Mental Status: She is alert and oriented to person, place, and time.      Cranial Nerves: No cranial nerve deficit.      Gait: Gait normal.   Psychiatric:         Behavior: Behavior normal.       PFTs as reviewed by me personally: as per hPI    Imaging as reviewed by me personally:  as per hPI    Assessment:  1. Chronic obstructive pulmonary disease, unspecified COPD type (HCC)  predniSONE (DELTASONE) 10 MG Tab    azithromycin (ZITHROMAX) 250 MG Tab    tiotropium (SPIRIVA RESPIMAT) 2.5 mcg/Act Aero Soln    fluticasone-salmeterol (ADVAIR HFA) 230-21 MCG/ACT inhaler    ipratropium-albuterol (DUONEB) 0.5-2.5 (3) MG/3ML nebulizer  solution    PULMONARY FUNCTION TESTS -Test requested: Complete Pulmonary Function Test    DISCONTINUED: ipratropium-albuterol (DUONEB) 0.5-2.5 (3) MG/3ML nebulizer solution      2. Chronic respiratory failure with hypoxia (HCC)        3. Former smoker        4. Lung nodules            Plan:  Chronic and moderate to severe. Overall sxs are stable. She has quit tobacco. We will continue ICS/LABA and LAMA; consider decreasing ICS dose given risk for PNA next visit. Encouraged duonebs for airway clearance/chest congestion but also gave emergency abx and prednisone for exacerbation if sxs worsen.   Chronic and 2/2 COPD. Pt is compliant with and benefiting from O2 continuously. Ambulatory oximetry in clinic today showed  Tobacco free for 30 days; encouraged ongoing abstinence; due for lung Ca screening CT which I advised pt to schedule  Return for CT chest now, PFT at time of f/u.

## 2023-09-20 NOTE — PROCEDURES
Multi-Ox Readings  Multi Ox #1 Room air   O2 sat % at rest 90   O2 sat % on exertion 87   O2 sat average on exertion     Multi Ox #2 2 LPM   O2 sat % at rest 93   O2 sat % on exertion 90   O2 sat average on exertion       Oxygen Use 3   Oxygen Frequency 24/7   Duration of need     Is the patient mobile within the home?     CPAP Use?     BIPAP Use?     Servo Titration

## 2023-10-27 ENCOUNTER — DOCUMENTATION (OUTPATIENT)
Dept: HEALTH INFORMATION MANAGEMENT | Facility: OTHER | Age: 63
End: 2023-10-27
Payer: MEDICAID

## 2023-12-11 DIAGNOSIS — I10 ESSENTIAL HYPERTENSION: ICD-10-CM

## 2023-12-12 RX ORDER — LISINOPRIL AND HYDROCHLOROTHIAZIDE 12.5; 1 MG/1; MG/1
1 TABLET ORAL DAILY
Qty: 90 TABLET | Refills: 3 | Status: SHIPPED | OUTPATIENT
Start: 2023-12-12

## 2023-12-12 NOTE — TELEPHONE ENCOUNTER
Requested Prescriptions     Pending Prescriptions Disp Refills    lisinopril-hydrochlorothiazide (PRINZIDE) 10-12.5 MG per tablet [Pharmacy Med Name: Lisinopril-hydroCHLOROthiazide 10-12.5 MG Oral Tablet] 90 Tablet 0     Sig: Take 1 tablet by mouth once daily     Last office visit: 7/31/23  Last lab: 1/25/23

## 2023-12-18 ENCOUNTER — TELEPHONE (OUTPATIENT)
Dept: SLEEP MEDICINE | Facility: MEDICAL CENTER | Age: 63
End: 2023-12-18
Payer: MEDICAID

## 2023-12-18 NOTE — TELEPHONE ENCOUNTER
Caller:  June (daughter)    Topic/issue: Kristel needs a new script from [facility] [place], they  want to take away her oxygen.    Callback Number:  [phone]    Thank you,    Nathaly KEMP

## 2023-12-26 NOTE — TELEPHONE ENCOUNTER
Unable to speak with anyone at [facility] but was able to leave a voicemail to cb and get more details as to why O2 is trying to be taken from Patient.     Please follow up on this for your patient.     Last Seen 09/20/2023 Dr. Benson

## 2023-12-27 NOTE — TELEPHONE ENCOUNTER
[facility] called and left a message.    [facility] needs a new order, new testing and recent chart notes in order for them to give patient new tanks. Per [facility], they will not be able to supply patient tanks until this is sent.      Called and left a message for patient. Patient has an appointment with Dr. Benson on 1/3/24.

## 2024-01-03 ENCOUNTER — OFFICE VISIT (OUTPATIENT)
Dept: SLEEP MEDICINE | Facility: MEDICAL CENTER | Age: 64
End: 2024-01-03
Attending: INTERNAL MEDICINE
Payer: MEDICAID

## 2024-01-03 VITALS — BODY MASS INDEX: 29.89 KG/M2 | WEIGHT: 168.7 LBS | HEIGHT: 63 IN

## 2024-01-03 VITALS
BODY MASS INDEX: 30.91 KG/M2 | SYSTOLIC BLOOD PRESSURE: 124 MMHG | DIASTOLIC BLOOD PRESSURE: 68 MMHG | HEIGHT: 62 IN | OXYGEN SATURATION: 99 % | WEIGHT: 168 LBS | HEART RATE: 82 BPM

## 2024-01-03 DIAGNOSIS — Z87.891 FORMER SMOKER: ICD-10-CM

## 2024-01-03 DIAGNOSIS — J96.11 CHRONIC RESPIRATORY FAILURE WITH HYPOXIA (HCC): ICD-10-CM

## 2024-01-03 DIAGNOSIS — J44.9 CHRONIC OBSTRUCTIVE PULMONARY DISEASE, UNSPECIFIED COPD TYPE (HCC): ICD-10-CM

## 2024-01-03 PROCEDURE — 99212 OFFICE O/P EST SF 10 MIN: CPT | Mod: XU | Performed by: INTERNAL MEDICINE

## 2024-01-03 PROCEDURE — 94726 PLETHYSMOGRAPHY LUNG VOLUMES: CPT | Performed by: INTERNAL MEDICINE

## 2024-01-03 PROCEDURE — 99214 OFFICE O/P EST MOD 30 MIN: CPT | Performed by: INTERNAL MEDICINE

## 2024-01-03 PROCEDURE — 3074F SYST BP LT 130 MM HG: CPT | Performed by: INTERNAL MEDICINE

## 2024-01-03 PROCEDURE — 94060 EVALUATION OF WHEEZING: CPT | Performed by: INTERNAL MEDICINE

## 2024-01-03 PROCEDURE — 94726 PLETHYSMOGRAPHY LUNG VOLUMES: CPT | Mod: 26 | Performed by: INTERNAL MEDICINE

## 2024-01-03 PROCEDURE — 94761 N-INVAS EAR/PLS OXIMETRY MLT: CPT | Performed by: INTERNAL MEDICINE

## 2024-01-03 PROCEDURE — 3078F DIAST BP <80 MM HG: CPT | Performed by: INTERNAL MEDICINE

## 2024-01-03 PROCEDURE — 94729 DIFFUSING CAPACITY: CPT | Performed by: INTERNAL MEDICINE

## 2024-01-03 PROCEDURE — 94729 DIFFUSING CAPACITY: CPT | Mod: 26 | Performed by: INTERNAL MEDICINE

## 2024-01-03 PROCEDURE — 94060 EVALUATION OF WHEEZING: CPT | Mod: 26 | Performed by: INTERNAL MEDICINE

## 2024-01-03 RX ORDER — FLUTICASONE FUROATE, UMECLIDINIUM BROMIDE AND VILANTEROL TRIFENATATE 100; 62.5; 25 UG/1; UG/1; UG/1
1 POWDER RESPIRATORY (INHALATION) DAILY
Qty: 2 EACH | Refills: 0 | COMMUNITY
Start: 2024-01-03 | End: 2024-03-14 | Stop reason: SDUPTHER

## 2024-01-03 ASSESSMENT — ENCOUNTER SYMPTOMS
MYALGIAS: 0
DEPRESSION: 0
DIZZINESS: 0
NECK PAIN: 0
FOCAL WEAKNESS: 0
SPEECH CHANGE: 0
COUGH: 0
SHORTNESS OF BREATH: 0
CONSTIPATION: 0
SINUS PAIN: 0
ORTHOPNEA: 0
BLURRED VISION: 0
WEIGHT LOSS: 0
ABDOMINAL PAIN: 0
BACK PAIN: 0
VOMITING: 0
DIARRHEA: 0
DIAPHORESIS: 0
CLAUDICATION: 0
FEVER: 0
SPUTUM PRODUCTION: 0
HEADACHES: 0
EYE DISCHARGE: 0
FALLS: 0
DOUBLE VISION: 0
EYE REDNESS: 0
EYE PAIN: 0
TREMORS: 0
PND: 0
WEAKNESS: 0
HEARTBURN: 0
CHILLS: 0
WHEEZING: 0
NAUSEA: 0
HEMOPTYSIS: 0
STRIDOR: 0
PHOTOPHOBIA: 0
SORE THROAT: 0
PALPITATIONS: 0

## 2024-01-03 ASSESSMENT — PULMONARY FUNCTION TESTS
FEV1/FVC_PERCENT_CHANGE: 6
FEV1/FVC_PERCENT_LLN: 66
FEV1/FVC_PERCENT_PREDICTED: 57
FEV1/FVC_PERCENT_PREDICTED: 79
FEV1_PERCENT_PREDICTED: 41
FEV1/FVC: 46
FEV1/FVC: 49
FVC: 1.49
FEV1/FVC: 45
FVC: 1.98
FEV1/FVC_PERCENT_CHANGE: 75
FVC_PERCENT_PREDICTED: 67
FEV1_PERCENT_PREDICTED: 29
FEV1/FVC_PERCENT_PREDICTED: 61
FEV1/FVC_PERCENT_PREDICTED: 58
FVC_PERCENT_PREDICTED: 50
FEV1/FVC_PERCENT_PREDICTED: 61
FVC_LLN: 2.46
FEV1/FVC_PREDICTED: 79
FEV1_LLN: 1.94
FEV1: 0.68
FEV1_PERCENT_CHANGE: 16
FVC_PREDICTED: 2.94
FEV1_PREDICTED: 2.32
FEV1_PERCENT_CHANGE: 12
FEV1/FVC: 48.48
FEV1: 0.96

## 2024-01-03 ASSESSMENT — FIBROSIS 4 INDEX
FIB4 SCORE: 1.4
FIB4 SCORE: 1.4

## 2024-01-03 ASSESSMENT — PATIENT HEALTH QUESTIONNAIRE - PHQ9: CLINICAL INTERPRETATION OF PHQ2 SCORE: 0

## 2024-01-03 NOTE — PROGRESS NOTES
Chief Complaint   Patient presents with    Follow-Up     LAST SEEN 9/20/23    Results     PFT 1/3/24         HPI: This patient is a 63 y.o. female whom is followed in our clinic for COPD c/b respiratory failure last seen by me on 9/20/23.  PMHx is significant for HLP, HTN and COPD previously followed at [facility].  She has been tobacco free now for the past 4 months with 30+ pk year hx.  She is on advair 230 HFA and spiriva and prn PAULETTE via [facility]. She uses supplemental O2 at 3 LPM pulsed and up to 5lPM with activity. She has not had exacerbation in the last year. Functionally she is MMRC 2-3. We referred her to virtual pulmonary rehab but the virtual program is no longer running.  She has had pulmonary nodules in the past but most recent CT from 8/12/22 showed subpleural scarring w/o distinct nodules, stable LAURA calcified granuloma. No LAD. PFTS from our last visit in March 23 showed worsened FEV1 of 0.66L or 28% post BD, air trapping and reduced DLCO.  Updated pulmonary function testing today shows improvement with postbronchodilator FEV1 at 0.96 L or 41% predicted.  She does have bronchodilator responsiveness.  She does have hyperinflation, air trapping and reduced DLCO at 58% predicted.  Overall she feels stable to slightly improved since her last clinic visit.  She is due for lung cancer screening CT but has not yet scheduled it as she has a grandson who recently required heart surgery so the family has been busy.      Social History     Socioeconomic History    Marital status:      Spouse name: Jm    Number of children: 2    Years of education: Not on file    Highest education level: High school graduate   Occupational History    Not on file   Tobacco Use    Smoking status: Every Day     Current packs/day: 0.50     Average packs/day: 0.5 packs/day for 40.0 years (20.0 ttl pk-yrs)     Types: Cigarettes     Passive exposure: Past    Smokeless tobacco: Never    Tobacco comments:     5 ciggs a day    Vaping Use     Vaping Use: Never used   Substance and Sexual Activity    Alcohol use: Yes     Alcohol/week: 12.6 - 14.4 oz     Types: 21 - 24 Cans of beer per week     Comment: 4 beers per day    Drug use: Not Currently    Sexual activity: Yes     Partners: Male     Birth control/protection: Post-Menopausal   Other Topics Concern    Not on file   Social History Narrative    Not on file     Social Determinants of Health     Financial Resource Strain: Not on file   Food Insecurity: Not on file   Transportation Needs: Not on file   Physical Activity: Not on file   Stress: Not on file   Social Connections: Not on file   Intimate Partner Violence: Not on file   Housing Stability: Not on file       Family History   Problem Relation Age of Onset    Kidney Disease Mother     Heart Disease Mother     Stroke Mother     Diabetes Mother     Diabetes Father     Kidney Disease Father     Arrythmia Father        Current Outpatient Medications on File Prior to Visit   Medication Sig Dispense Refill    tiotropium (SPIRIVA RESPIMAT) 2.5 mcg/Act Aero Soln Inhale 2 Inhalations every day. 4 g 11    fluticasone-salmeterol (ADVAIR HFA) 230-21 MCG/ACT inhaler Inhale 2 Puffs 2 times a day. 12 g 11    ipratropium-albuterol (DUONEB) 0.5-2.5 (3) MG/3ML nebulizer solution Take 3 mL by nebulization every four hours as needed for Shortness of Breath. 120 mL 11    albuterol (PROAIR HFA) 108 (90 Base) MCG/ACT Aero Soln inhalation aerosol Inhale 2 Puffs every 6 hours as needed for Shortness of Breath. 18 g 6    lisinopril-hydrochlorothiazide (PRINZIDE) 10-12.5 MG per tablet Take 1 tablet by mouth once daily 90 Tablet 3    predniSONE (DELTASONE) 10 MG Tab Take 30mg x 3 days, then take 20mg x 3 days, then take 10mg x 3 days, with food, then discontinue. (Patient not taking: Reported on 1/3/2024) 18 Tablet 0    azithromycin (ZITHROMAX) 250 MG Tab Take 2 tablets on day 1, then take 1 tablet a day for 4 days. 6 Tablet 0    vitamin D2, Ergocalciferol, (DRISDOL) 1.25  MG (44314 UT) Cap capsule TAKE 1 CAPSULE BY MOUTH ONCE A WEEK FOR  12  DOSES,  THEN  TAKE  OVER  THE  COUNTER  VITAMIN  D  5000  UNITS  DAILY  THEREAFTER. 12 Capsule 0    atorvastatin (LIPITOR) 20 MG Tab Take 1 Tablet by mouth every evening. 90 Tablet 3    escitalopram (LEXAPRO) 20 MG tablet Take 1 Tablet by mouth every day. 90 Tablet 3    propranolol LA (INDERAL LA) 60 MG CAPSULE SR 24 HR Take 1 Capsule by mouth every day. 90 Capsule 3    varenicline (CHANTIX) 1 MG tablet One tablet daily as maintainence 60 Tablet 6    aspirin EC (ECOTRIN) 81 MG Tablet Delayed Response Take 1 Tablet by mouth every day. 100 Tablet 3    naproxen (NAPROSYN) 500 MG Tab Take 1 Tablet by mouth 2 times a day with meals. 60 Tablet 0    hydrOXYzine HCl (ATARAX) 25 MG Tab Take 1 tablet by mouth 3 times a day as needed for Anxiety. 30 tablet 0     No current facility-administered medications on file prior to visit.       Penicillins      ROS:   Review of Systems   Constitutional:  Negative for chills, diaphoresis, fever, malaise/fatigue and weight loss.   HENT:  Negative for congestion, ear discharge, ear pain, hearing loss, nosebleeds, sinus pain, sore throat and tinnitus.    Eyes:  Negative for blurred vision, double vision, photophobia, pain, discharge and redness.   Respiratory:  Negative for cough, hemoptysis, sputum production, shortness of breath, wheezing and stridor.    Cardiovascular:  Negative for chest pain, palpitations, orthopnea, claudication, leg swelling and PND.   Gastrointestinal:  Negative for abdominal pain, constipation, diarrhea, heartburn, nausea and vomiting.   Genitourinary:  Negative for dysuria and urgency.   Musculoskeletal:  Negative for back pain, falls, joint pain, myalgias and neck pain.   Skin:  Negative for itching and rash.   Neurological:  Negative for dizziness, tremors, speech change, focal weakness, weakness and headaches.   Endo/Heme/Allergies:  Negative for environmental allergies.  "  Psychiatric/Behavioral:  Negative for depression.        /68 (BP Location: Right arm, Patient Position: Sitting, BP Cuff Size: Adult)   Pulse 82   Ht 1.575 m (5' 2\")   Wt 76.2 kg (168 lb)   SpO2 99%   Physical Exam  Constitutional:       General: She is not in acute distress.     Appearance: Normal appearance. She is well-developed and normal weight.   HENT:      Head: Normocephalic and atraumatic.      Right Ear: External ear normal.      Left Ear: External ear normal.      Nose: Nose normal. No congestion.      Mouth/Throat:      Mouth: Mucous membranes are moist.      Pharynx: Oropharynx is clear. No oropharyngeal exudate.   Eyes:      General: No scleral icterus.     Extraocular Movements: Extraocular movements intact.      Conjunctiva/sclera: Conjunctivae normal.      Pupils: Pupils are equal, round, and reactive to light.   Neck:      Vascular: No JVD.      Trachea: No tracheal deviation.   Cardiovascular:      Rate and Rhythm: Normal rate and regular rhythm.      Heart sounds: Normal heart sounds. No murmur heard.     No friction rub. No gallop.   Pulmonary:      Effort: Pulmonary effort is normal. No accessory muscle usage or respiratory distress.      Breath sounds: No wheezing or rales.      Comments: Decreased air movement bilaterally throughout  Abdominal:      General: There is no distension.      Palpations: Abdomen is soft.      Tenderness: There is no abdominal tenderness.   Musculoskeletal:         General: No tenderness or deformity. Normal range of motion.      Cervical back: Normal range of motion and neck supple.      Right lower leg: No edema.      Left lower leg: No edema.   Lymphadenopathy:      Cervical: No cervical adenopathy.   Skin:     General: Skin is warm and dry.      Findings: No rash.      Nails: There is no clubbing.   Neurological:      Mental Status: She is alert and oriented to person, place, and time.      Cranial Nerves: No cranial nerve deficit.      Gait: Gait " normal.   Psychiatric:         Behavior: Behavior normal.         PFTs as reviewed by me personally: As per HPI    Imaging as reviewed by me personally: As per HPI    Assessment:  1. Chronic respiratory failure with hypoxia (HCC)  Multiple Oximetry    Multiple Oximetry    DME O2 New Set Up    fluticasone-umeclidinium-vilanterol (TRELEGY ELLIPTA) 100-62.5-25 mcg/act inhaler    CANCELED: DME O2 New Set Up      2. Chronic obstructive pulmonary disease, unspecified COPD type (HCC)  Multiple Oximetry    Multiple Oximetry    DME O2 New Set Up    fluticasone-umeclidinium-vilanterol (TRELEGY ELLIPTA) 100-62.5-25 mcg/act inhaler    CANCELED: DME O2 New Set Up      3. Former smoker  CT-LUNG CANCER-SCREENING          Plan:  Chronic and secondary to COPD.  Oxygen needs are confirmed during ambulatory oximetry today at 2 L/min continuous.  New oxygen order placed.  Chronic and overall stable based on symptoms.  Pulmonary function testing slightly improved and she is not tobacco free.  I would like to decrease her inhaled corticosteroid dose if possible and she was given a sample of Trelegy 100 today Lot [id number], expiration 2/25 x 2.  If symptoms do not worsen and she prefers Trelegy 2 we will make a formal transition.  If respiratory symptoms worsen on this she will resume Advair and Spiriva.  Tobacco free.  Patient is tobacco free for months.  I congratulated her on ongoing efforts.  She was given order for lung cancer screening CT to schedule at her convenience.  Return in about 6 months (around 7/3/2024) for COPD, ct jesse phan

## 2024-01-03 NOTE — PROCEDURES
Tech: Danni Dill, RT  Good patient effort & cooperation.  Test was performed on the [employer] Body Plethysmograph- Elite DX system.  The predicted sets used for Spirometry are GLI-2012, for Lung Volumes are ITS, and for DLCO is GLI 2017.  The results of this test meet the ATS standards for acceptability and repeatability.  The DLCO was uncorrected for Hb.  A bronchodilator of Ventolin HFA 2 puffs via spacer was administered.  DLCO was performed during dilation period.    Interpretation:  Baseline spirometry shows airflow obstruction with an FEV1/FVC ratio 45 and an FEV1 of 0.68 L or 29% predicted.  There is significant bronchodilator response with improvement in FEV1 to 0.96 L or 41% predicted.  Total lung capacity is just above the upper limits of normal at 5.97 L or 123% predicted.  There is significant air trapping with residual of 192% predicted.  Diffusion capacity is reduced at 58% predicted.  Pulmonary function testing shows severe airflow obstruction with hyperinflation, air trapping and reduced DLCO consistent with advanced obstructive pulmonary disease.

## 2024-01-03 NOTE — PROCEDURES
Multi-Ox Readings  Multi Ox #1 Room air   O2 sat % at rest 97   O2 sat % on exertion 85   O2 sat average on exertion     Multi Ox #2 2 LPM   O2 sat % at rest 96   O2 sat % on exertion 88   O2 sat average on exertion       Multi Ox #3 Testing stopped, patient was exhausted    O2 sat % at rest    O2 sat % on exertion    O2 sat average on exertion

## 2024-01-04 ENCOUNTER — PATIENT MESSAGE (OUTPATIENT)
Dept: SLEEP MEDICINE | Facility: MEDICAL CENTER | Age: 64
End: 2024-01-04
Payer: MEDICAID

## 2024-01-04 DIAGNOSIS — R91.8 LUNG NODULES: ICD-10-CM

## 2024-01-26 ENCOUNTER — HOSPITAL ENCOUNTER (OUTPATIENT)
Dept: RADIOLOGY | Facility: MEDICAL CENTER | Age: 64
End: 2024-01-26
Attending: INTERNAL MEDICINE
Payer: MEDICAID

## 2024-01-26 DIAGNOSIS — Z87.891 FORMER SMOKER: ICD-10-CM

## 2024-01-26 PROCEDURE — 71271 CT THORAX LUNG CANCER SCR C-: CPT

## 2024-02-02 NOTE — PROGRESS NOTES
Patient with new groundglass opacity in right upper lobe looking more consistent with inflammation however I do recommend repeat CT in 3 months.  Contacted patient and spoke with daughter who will schedule CT ensure that patient follows through.

## 2024-02-15 ENCOUNTER — TELEPHONE (OUTPATIENT)
Dept: MEDICAL GROUP | Facility: CLINIC | Age: 64
End: 2024-02-15
Payer: MEDICAID

## 2024-02-16 NOTE — TELEPHONE ENCOUNTER
DOCUMENTATION OF PAR STATUS:      FRANC ZALDIVAR (Key: [id number])  PA Case ID #: [id number]  Rx #: [id number]  Need Help? Call us at [phone]  Outcome  Additional Information Required  ALBUTEROL SULFATE HFA 90 MCG HFA AER AD is supplied in a non-breakable package size. The quantity submitted is not a whole multiple of that non-breakable package size. Please resubmit your request with a quantity that is a multiple of the non-breakable package size.  Drug  Albuterol Sulfate  (90 Base)MCG/ACT aerosol  ePA cloud logo  Form  [employer] Medicaid Electronic PA Form  Original Claim Info  [id number],[id number]

## 2024-02-28 ENCOUNTER — TELEPHONE (OUTPATIENT)
Dept: HEALTH INFORMATION MANAGEMENT | Facility: OTHER | Age: 64
End: 2024-02-28
Payer: MEDICAID

## 2024-03-01 DIAGNOSIS — F17.210 TOBACCO DEPENDENCE DUE TO CIGARETTES: ICD-10-CM

## 2024-03-05 RX ORDER — VARENICLINE TARTRATE 1 MG/1
TABLET, FILM COATED ORAL
Qty: 30 TABLET | Refills: 0 | Status: SHIPPED | OUTPATIENT
Start: 2024-03-05

## 2024-03-05 NOTE — TELEPHONE ENCOUNTER
Requested Prescriptions     Pending Prescriptions Disp Refills    varenicline (CHANTIX) 1 MG tablet [Pharmacy Med Name: Varenicline Tartrate 1 MG Oral Tablet] 30 Tablet 0     Sig: TAKE 1 TABLET BY MOUTH ONCE DAILY AS  MAINTENANCE      Last office visit: 7/31/23  Last lab: 1/25/23

## 2024-03-14 ENCOUNTER — TELEPHONE (OUTPATIENT)
Dept: SLEEP MEDICINE | Facility: MEDICAL CENTER | Age: 64
End: 2024-03-14
Payer: MEDICAID

## 2024-03-14 ENCOUNTER — PATIENT MESSAGE (OUTPATIENT)
Dept: SLEEP MEDICINE | Facility: MEDICAL CENTER | Age: 64
End: 2024-03-14
Payer: MEDICAID

## 2024-03-14 DIAGNOSIS — J44.9 CHRONIC OBSTRUCTIVE PULMONARY DISEASE, UNSPECIFIED COPD TYPE (HCC): ICD-10-CM

## 2024-03-14 DIAGNOSIS — J96.11 CHRONIC RESPIRATORY FAILURE WITH HYPOXIA (HCC): ICD-10-CM

## 2024-03-14 RX ORDER — FLUTICASONE FUROATE, UMECLIDINIUM BROMIDE AND VILANTEROL TRIFENATATE 100; 62.5; 25 UG/1; UG/1; UG/1
1 POWDER RESPIRATORY (INHALATION) DAILY
Qty: 3 EACH | Refills: 3 | Status: SHIPPED | OUTPATIENT
Start: 2024-03-14 | End: 2024-03-19

## 2024-03-14 NOTE — TELEPHONE ENCOUNTER
Received RX for Trelegy, per insurance not preferred. Covered alternatives are Anoro, Incruse, Spiriva, Stiolto, Tudorza.

## 2024-03-15 ENCOUNTER — PATIENT MESSAGE (OUTPATIENT)
Dept: SLEEP MEDICINE | Facility: MEDICAL CENTER | Age: 64
End: 2024-03-15
Payer: MEDICAID

## 2024-03-15 DIAGNOSIS — J44.9 CHRONIC OBSTRUCTIVE PULMONARY DISEASE, UNSPECIFIED COPD TYPE (HCC): ICD-10-CM

## 2024-03-15 DIAGNOSIS — J96.11 CHRONIC RESPIRATORY FAILURE WITH HYPOXIA (HCC): ICD-10-CM

## 2024-03-19 RX ORDER — FLUTICASONE PROPIONATE AND SALMETEROL XINAFOATE 230; 21 UG/1; UG/1
2 AEROSOL, METERED RESPIRATORY (INHALATION) 2 TIMES DAILY
Qty: 3 EACH | Refills: 3 | Status: SHIPPED | OUTPATIENT
Start: 2024-03-19

## 2024-03-19 RX ORDER — TIOTROPIUM BROMIDE INHALATION SPRAY 3.12 UG/1
5 SPRAY, METERED RESPIRATORY (INHALATION) DAILY
Qty: 3 EACH | Refills: 3 | Status: SHIPPED | OUTPATIENT
Start: 2024-03-19

## 2024-03-19 NOTE — TELEPHONE ENCOUNTER
PA denied, preferred anticholinergics and combinations listed by insurance: Anoro, Incruse, Spiriva, Spiriva Respimat, Stiolto Respimat, Tudorza.

## 2024-04-05 ENCOUNTER — PATIENT MESSAGE (OUTPATIENT)
Dept: SLEEP MEDICINE | Facility: MEDICAL CENTER | Age: 64
End: 2024-04-05
Payer: MEDICAID

## 2024-04-05 ENCOUNTER — TELEPHONE (OUTPATIENT)
Dept: HEMATOLOGY ONCOLOGY | Facility: MEDICAL CENTER | Age: 64
End: 2024-04-05
Payer: MEDICAID

## 2024-04-05 RX ORDER — FLUTICASONE FUROATE, UMECLIDINIUM BROMIDE AND VILANTEROL TRIFENATATE 100; 62.5; 25 UG/1; UG/1; UG/1
1 POWDER RESPIRATORY (INHALATION) DAILY
Qty: 180 EACH | Refills: 3 | Status: SHIPPED | OUTPATIENT
Start: 2024-04-05 | End: 2024-04-10

## 2024-04-05 NOTE — TELEPHONE ENCOUNTER
Prior Authorization for Ricardo Rocha 100-62.5-25MCG/ACT aerosol powder (Quantity: 180, Days: 90) has been submitted via Cover My Meds: Key ([id number])    Insurance: CHRIS    Will follow up in 24-48 business hours.

## 2024-04-09 ENCOUNTER — OFFICE VISIT (OUTPATIENT)
Dept: MEDICAL GROUP | Facility: CLINIC | Age: 64
End: 2024-04-09
Payer: MEDICAID

## 2024-04-09 VITALS
SYSTOLIC BLOOD PRESSURE: 122 MMHG | OXYGEN SATURATION: 98 % | TEMPERATURE: 97.9 F | HEIGHT: 62 IN | BODY MASS INDEX: 32.86 KG/M2 | RESPIRATION RATE: 16 BRPM | HEART RATE: 67 BPM | WEIGHT: 178.57 LBS | DIASTOLIC BLOOD PRESSURE: 56 MMHG

## 2024-04-09 DIAGNOSIS — R91.1 LUNG NODULE: ICD-10-CM

## 2024-04-09 DIAGNOSIS — I10 ESSENTIAL HYPERTENSION: ICD-10-CM

## 2024-04-09 DIAGNOSIS — Q18.1 CYST ON EAR: ICD-10-CM

## 2024-04-09 DIAGNOSIS — F33.1 MODERATE EPISODE OF RECURRENT MAJOR DEPRESSIVE DISORDER (HCC): ICD-10-CM

## 2024-04-09 DIAGNOSIS — Z13.21 ENCOUNTER FOR VITAMIN DEFICIENCY SCREENING: ICD-10-CM

## 2024-04-09 DIAGNOSIS — L68.0 HIRSUTISM: ICD-10-CM

## 2024-04-09 DIAGNOSIS — E66.9 OBESITY (BMI 30-39.9): ICD-10-CM

## 2024-04-09 PROCEDURE — 3074F SYST BP LT 130 MM HG: CPT | Performed by: PHYSICIAN ASSISTANT

## 2024-04-09 PROCEDURE — 99214 OFFICE O/P EST MOD 30 MIN: CPT | Performed by: PHYSICIAN ASSISTANT

## 2024-04-09 PROCEDURE — 3078F DIAST BP <80 MM HG: CPT | Performed by: PHYSICIAN ASSISTANT

## 2024-04-09 RX ORDER — VENLAFAXINE HYDROCHLORIDE 75 MG/1
75 CAPSULE, EXTENDED RELEASE ORAL DAILY
Qty: 30 CAPSULE | Refills: 3 | Status: SHIPPED | OUTPATIENT
Start: 2024-04-09

## 2024-04-09 ASSESSMENT — FIBROSIS 4 INDEX: FIB4 SCORE: 1.4

## 2024-04-09 NOTE — PROGRESS NOTES
cc:  establish care    Subjective:     Kristel Sewell is a 63 y.o. female presenting for [facility] care       Patient presents to the office for [facility] care.  Patient is complaining of depression.  She has lost interest in things that she normal likes to do.  She states that she is still taking lexapro and has been on this for 2 years.  She does acknowledge marijuana use that she has engaged in since the age of 13.  She is also using alcohol.    Patient has been having symptoms of hirsutism for 2 years.  It has been increasing.  She was told that there was medicine for this and would like to start medication.    Patient has a growth on her right ear.  It has been a problem for 4 months and it is not painful.  She does wear nasal cannula and her  believes that this developed with use of nasal cannula    Patient does have questions regarding CT of her lungs which was done previous to this visit.  She does see pulmonary medicine.  She has an appointment to follow-up with the repeat CT in May.    Review of systems:  See above.   Denies any symptoms unless previously indicated.        Current Outpatient Medications:     venlafaxine XR (EFFEXOR XR) 75 MG CAPSULE SR 24 HR, Take 1 Capsule by mouth every day., Disp: 30 Capsule, Rfl: 3    fluticasone-umeclidinium-vilanterol (TRELEGY ELLIPTA) 100-62.5-25 mcg/act inhaler, Inhale 1 Puff every day., Disp: 180 Each, Rfl: 3    fluticasone-salmeterol (ADVAIR HFA) 230-21 MCG/ACT inhaler, Inhale 2 Puffs 2 times a day. Use with spacer.  Rinse mouth after each use., Disp: 3 Each, Rfl: 3    tiotropium (SPIRIVA RESPIMAT) 2.5 mcg/Act Aero Soln, Inhale 2 Inhalations every day. Assemble and prime., Disp: 3 Each, Rfl: 3    lisinopril-hydrochlorothiazide (PRINZIDE) 10-12.5 MG per tablet, Take 1 tablet by mouth once daily, Disp: 90 Tablet, Rfl: 3    ipratropium-albuterol (DUONEB) 0.5-2.5 (3) MG/3ML nebulizer solution, Take 3 mL by nebulization every four hours as needed for  "Shortness of Breath., Disp: 120 mL, Rfl: 11    atorvastatin (LIPITOR) 20 MG Tab, Take 1 Tablet by mouth every evening., Disp: 90 Tablet, Rfl: 3    escitalopram (LEXAPRO) 20 MG tablet, Take 1 Tablet by mouth every day., Disp: 90 Tablet, Rfl: 3    propranolol LA (INDERAL LA) 60 MG CAPSULE SR 24 HR, Take 1 Capsule by mouth every day., Disp: 90 Capsule, Rfl: 3    albuterol (PROAIR HFA) 108 (90 Base) MCG/ACT Aero Soln inhalation aerosol, Inhale 2 Puffs every 6 hours as needed for Shortness of Breath., Disp: 18 g, Rfl: 6    aspirin EC (ECOTRIN) 81 MG Tablet Delayed Response, Take 1 Tablet by mouth every day., Disp: 100 Tablet, Rfl: 3    naproxen (NAPROSYN) 500 MG Tab, Take 1 Tablet by mouth 2 times a day with meals., Disp: 60 Tablet, Rfl: 0    hydrOXYzine HCl (ATARAX) 25 MG Tab, Take 1 tablet by mouth 3 times a day as needed for Anxiety., Disp: 30 tablet, Rfl: 0    Allergies, past medical history, past surgical history, family history, social history reviewed and updated    Objective:     Vitals: /56 (BP Location: Left arm, Patient Position: Sitting, BP Cuff Size: Large adult)   Pulse 67   Temp 36.6 °C (97.9 °F) (Temporal)   Resp 16   Ht 1.575 m (5' 2\")   Wt 81 kg (178 lb 9.2 oz)   SpO2 98%   BMI 32.66 kg/m²   General: Alert, pleasant, NAD  EYES:   PERRL, EOMI, no icterus or pallor.  Conjunctivae and lids normal.   HENT:  Normocephalic.  External ears normal.  Cystlike structure posterior pinna right ear.  It appears white as though it is filled with a white substance.  It does not appear red inflamed or infected.  Neck supple.    Respiratory: Normal respiratory effort.  .  Abdomen: obese  Skin: Warm, dry, no rashes.  Musculoskeletal: Gait is normal.  Moves all extremities well.    Extremities: Normal range of motion all extremities.   Neurological: No tremors, sensation grossly intact,  CN2-12 intact.  Psych:  Affect/mood is normal, judgement is good, memory is intact, grooming is " appropriate.    Assessment/Plan:     Kristel was seen today for bump, hirsutism and depression.    Diagnoses and all orders for this visit:    Moderate episode of recurrent major depressive disorder (HCC)  -     venlafaxine XR (EFFEXOR XR) 75 MG CAPSULE SR 24 HR; Take 1 Capsule by mouth every day.    Lexapro is no longer effective.  We did have a discussion in office that this could also be related to her alcohol and marijuana use.  I have recommended that she slowly come off of the substances.  In the meantime we will switch out the Lexapro for Effexor.  Side effects and risks discussed with patient.  Follow-up in 2 to 4 weeks.    Hirsutism  -     Comp Metabolic Panel; Future  -     TSH WITH REFLEX TO FT4; Future  -     TESTOSTERONE F&T FEMALES/CHILD; Future  Essential hypertension  -     Lipid Profile; Future  -     Comp Metabolic Panel; Future  Encounter for vitamin deficiency screening  -     VITAMIN D,25 HYDROXY (DEFICIENCY); Future  Obesity (BMI 30-39.9)  -     Lipid Profile; Future  -     Comp Metabolic Panel; Future    Will obtain labs to evaluate further.  Will follow-up in 2 to 4 weeks with test results.    Cyst on ear  -     Referral to General Surgery    Will submit a referral to general surgery to see if they are able to help with this cystic lesion.    Lung nodule    Patient will continue to follow-up with pulmonary medicine.  She does have a repeat CT scheduled as well.        No follow-ups on file.    Please note that this dictation was created using voice recognition software. I have made every reasonable attempt to correct obvious errors, but expect that there are errors of grammar and possible content that I did not discover before finalizing note.

## 2024-04-09 NOTE — TELEPHONE ENCOUNTER
PA for Trelegy denied, per insurance preferred combos are: Anoro, Incruse, Spiriva, Stiolto and Tudorza.

## 2024-04-23 ENCOUNTER — HOSPITAL ENCOUNTER (OUTPATIENT)
Dept: LAB | Facility: MEDICAL CENTER | Age: 64
End: 2024-04-23
Attending: PHYSICIAN ASSISTANT
Payer: MEDICAID

## 2024-04-23 DIAGNOSIS — E66.9 OBESITY (BMI 30-39.9): ICD-10-CM

## 2024-04-23 DIAGNOSIS — L68.0 HIRSUTISM: ICD-10-CM

## 2024-04-23 DIAGNOSIS — Z13.21 ENCOUNTER FOR VITAMIN DEFICIENCY SCREENING: ICD-10-CM

## 2024-04-23 DIAGNOSIS — I10 ESSENTIAL HYPERTENSION: ICD-10-CM

## 2024-04-23 LAB
25(OH)D3 SERPL-MCNC: 29 NG/ML (ref 30–100)
ALBUMIN SERPL BCP-MCNC: 4.4 G/DL (ref 3.2–4.9)
ALBUMIN/GLOB SERPL: 1.4 G/DL
ALP SERPL-CCNC: 77 U/L (ref 30–99)
ALT SERPL-CCNC: 20 U/L (ref 2–50)
ANION GAP SERPL CALC-SCNC: 9 MMOL/L (ref 7–16)
AST SERPL-CCNC: 23 U/L (ref 12–45)
BILIRUB SERPL-MCNC: 0.4 MG/DL (ref 0.1–1.5)
BUN SERPL-MCNC: 9 MG/DL (ref 8–22)
CALCIUM ALBUM COR SERPL-MCNC: 9.2 MG/DL (ref 8.5–10.5)
CALCIUM SERPL-MCNC: 9.5 MG/DL (ref 8.5–10.5)
CHLORIDE SERPL-SCNC: 88 MMOL/L (ref 96–112)
CHOLEST SERPL-MCNC: 112 MG/DL (ref 100–199)
CO2 SERPL-SCNC: 32 MMOL/L (ref 20–33)
CREAT SERPL-MCNC: 0.66 MG/DL (ref 0.5–1.4)
FASTING STATUS PATIENT QL REPORTED: NORMAL
GFR SERPLBLD CREATININE-BSD FMLA CKD-EPI: 99 ML/MIN/1.73 M 2
GLOBULIN SER CALC-MCNC: 3.1 G/DL (ref 1.9–3.5)
GLUCOSE SERPL-MCNC: 74 MG/DL (ref 65–99)
HDLC SERPL-MCNC: 52 MG/DL
LDLC SERPL CALC-MCNC: 45 MG/DL
POTASSIUM SERPL-SCNC: 4.6 MMOL/L (ref 3.6–5.5)
PROT SERPL-MCNC: 7.5 G/DL (ref 6–8.2)
SODIUM SERPL-SCNC: 129 MMOL/L (ref 135–145)
TRIGL SERPL-MCNC: 77 MG/DL (ref 0–149)
TSH SERPL DL<=0.005 MIU/L-ACNC: 1.47 UIU/ML (ref 0.38–5.33)

## 2024-04-23 PROCEDURE — 84443 ASSAY THYROID STIM HORMONE: CPT

## 2024-04-23 PROCEDURE — 82306 VITAMIN D 25 HYDROXY: CPT

## 2024-04-23 PROCEDURE — 36415 COLL VENOUS BLD VENIPUNCTURE: CPT

## 2024-04-23 PROCEDURE — 84270 ASSAY OF SEX HORMONE GLOBUL: CPT

## 2024-04-23 PROCEDURE — 84403 ASSAY OF TOTAL TESTOSTERONE: CPT

## 2024-04-23 PROCEDURE — 80061 LIPID PANEL: CPT

## 2024-04-23 PROCEDURE — 80053 COMPREHEN METABOLIC PANEL: CPT

## 2024-04-23 PROCEDURE — 84402 ASSAY OF FREE TESTOSTERONE: CPT

## 2024-04-28 LAB
SHBG SERPL-SCNC: 39 NMOL/L (ref 17–125)
TESTOST FREE SERPL-MCNC: 2.7 PG/ML (ref 0.6–3.8)
TESTOST SERPL-MCNC: 18 NG/DL (ref 5–32)

## 2024-05-08 ENCOUNTER — HOSPITAL ENCOUNTER (OUTPATIENT)
Dept: RADIOLOGY | Facility: MEDICAL CENTER | Age: 64
End: 2024-05-08
Attending: INTERNAL MEDICINE
Payer: MEDICAID

## 2024-05-08 DIAGNOSIS — R91.8 LUNG NODULES: ICD-10-CM

## 2024-05-21 ENCOUNTER — OFFICE VISIT (OUTPATIENT)
Dept: MEDICAL GROUP | Facility: CLINIC | Age: 64
End: 2024-05-21
Payer: MEDICAID

## 2024-05-21 VITALS
WEIGHT: 172.4 LBS | RESPIRATION RATE: 18 BRPM | HEART RATE: 63 BPM | SYSTOLIC BLOOD PRESSURE: 140 MMHG | HEIGHT: 62 IN | TEMPERATURE: 98 F | DIASTOLIC BLOOD PRESSURE: 70 MMHG | OXYGEN SATURATION: 94 % | BODY MASS INDEX: 31.73 KG/M2

## 2024-05-21 DIAGNOSIS — Z12.12 SCREENING FOR COLORECTAL CANCER: ICD-10-CM

## 2024-05-21 DIAGNOSIS — F33.1 MODERATE EPISODE OF RECURRENT MAJOR DEPRESSIVE DISORDER (HCC): ICD-10-CM

## 2024-05-21 DIAGNOSIS — J44.89 COPD (CHRONIC OBSTRUCTIVE PULMONARY DISEASE) WITH CHRONIC BRONCHITIS (HCC): ICD-10-CM

## 2024-05-21 DIAGNOSIS — Z12.11 SCREENING FOR COLORECTAL CANCER: ICD-10-CM

## 2024-05-21 DIAGNOSIS — I10 ESSENTIAL HYPERTENSION: ICD-10-CM

## 2024-05-21 DIAGNOSIS — Z99.81 SUPPLEMENTAL OXYGEN DEPENDENT: ICD-10-CM

## 2024-05-21 DIAGNOSIS — L68.0 HIRSUTISM: ICD-10-CM

## 2024-05-21 DIAGNOSIS — E78.2 MIXED DYSLIPIDEMIA: ICD-10-CM

## 2024-05-21 DIAGNOSIS — E87.1 HYPONATREMIA: ICD-10-CM

## 2024-05-21 DIAGNOSIS — F41.1 GENERALIZED ANXIETY DISORDER: ICD-10-CM

## 2024-05-21 PROBLEM — F17.200 CURRENT SMOKER: Status: ACTIVE | Noted: 2024-05-21

## 2024-05-21 PROCEDURE — 99214 OFFICE O/P EST MOD 30 MIN: CPT | Performed by: PHYSICIAN ASSISTANT

## 2024-05-21 PROCEDURE — 3078F DIAST BP <80 MM HG: CPT | Performed by: PHYSICIAN ASSISTANT

## 2024-05-21 PROCEDURE — 3077F SYST BP >= 140 MM HG: CPT | Performed by: PHYSICIAN ASSISTANT

## 2024-05-21 RX ORDER — VENLAFAXINE HYDROCHLORIDE 150 MG/1
150 CAPSULE, EXTENDED RELEASE ORAL DAILY
Qty: 90 CAPSULE | Refills: 3 | Status: SHIPPED | OUTPATIENT
Start: 2024-05-21

## 2024-05-21 ASSESSMENT — FIBROSIS 4 INDEX: FIB4 SCORE: 1.386362146049869612

## 2024-05-21 NOTE — PROGRESS NOTES
cc:  depression    Subjective:     Kristel Sewell is a 63 y.o. female presenting for depression        History of Present Illness  The patient is a 71-year-old female here to follow up with medications. She prefers pronouns she and her. She is accompanied by an adult female.    The patient has discontinued the use of Lexapro and is currently on a regimen of venlafaxine, which she reports as being highly effective. Initially, she attempted to take the medication at night, but experienced depressive symptoms skilled nursing through the day. Consequently, the administration of venlafaxine in the morning post-prandially led to a noticeable improvement in her condition. Despite this, she continues to experience episodes of anxiety and depression and is contemplating an increase in her dosage. She reports no adverse effects from her medication.    Earlier this year, the patient was advised to undergo two cancer screenings. However, no alterations were detected in her most recent cancer screening. She does have COPD and is on supplemental oxygen.       Patient is here to discuss further test results.  He does have hypertension, mixed dyslipidemia, hyponatremia.  She also has hirsutism.      Review of systems:  See above.   Denies any symptoms unless previously indicated.        Current Outpatient Medications:     venlafaxine (EFFEXOR-XR) 150 MG extended-release capsule, Take 1 Capsule by mouth every day., Disp: 90 Capsule, Rfl: 3    fluticasone-salmeterol (ADVAIR HFA) 230-21 MCG/ACT inhaler, Inhale 2 Puffs 2 times a day. Use with spacer.  Rinse mouth after each use., Disp: 3 Each, Rfl: 3    tiotropium (SPIRIVA RESPIMAT) 2.5 mcg/Act Aero Soln, Inhale 2 Inhalations every day. Assemble and prime., Disp: 3 Each, Rfl: 3    lisinopril-hydrochlorothiazide (PRINZIDE) 10-12.5 MG per tablet, Take 1 tablet by mouth once daily, Disp: 90 Tablet, Rfl: 3    ipratropium-albuterol (DUONEB) 0.5-2.5 (3) MG/3ML nebulizer solution, Take 3 mL by  "nebulization every four hours as needed for Shortness of Breath., Disp: 120 mL, Rfl: 11    atorvastatin (LIPITOR) 20 MG Tab, Take 1 Tablet by mouth every evening., Disp: 90 Tablet, Rfl: 3    propranolol LA (INDERAL LA) 60 MG CAPSULE SR 24 HR, Take 1 Capsule by mouth every day., Disp: 90 Capsule, Rfl: 3    albuterol (PROAIR HFA) 108 (90 Base) MCG/ACT Aero Soln inhalation aerosol, Inhale 2 Puffs every 6 hours as needed for Shortness of Breath., Disp: 18 g, Rfl: 6    aspirin EC (ECOTRIN) 81 MG Tablet Delayed Response, Take 1 Tablet by mouth every day., Disp: 100 Tablet, Rfl: 3    naproxen (NAPROSYN) 500 MG Tab, Take 1 Tablet by mouth 2 times a day with meals., Disp: 60 Tablet, Rfl: 0    hydrOXYzine HCl (ATARAX) 25 MG Tab, Take 1 tablet by mouth 3 times a day as needed for Anxiety., Disp: 30 tablet, Rfl: 0    Allergies, past medical history, past surgical history, family history, social history reviewed and updated    Objective:     Vitals: BP (!) 140/70 (BP Location: Left arm, Patient Position: Sitting, BP Cuff Size: Adult)   Pulse 63   Temp 36.7 °C (98 °F) (Temporal)   Resp 18   Ht 1.575 m (5' 2\")   Wt 78.2 kg (172 lb 6.4 oz)   SpO2 94% Comment: pt is on o2  BMI 31.53 kg/m²   Physical Exam  Constitutional:       Appearance: She is obese.   HENT:      Head: Normocephalic and atraumatic.      Right Ear: External ear normal.      Left Ear: External ear normal.      Nose: Nose normal.   Eyes:      Extraocular Movements: Extraocular movements intact.      Conjunctiva/sclera: Conjunctivae normal.      Pupils: Pupils are equal, round, and reactive to light.   Pulmonary:      Comments: On O2 nasal canula  Abdominal:      Comments: obese   Musculoskeletal:         General: Normal range of motion.      Cervical back: Normal range of motion and neck supple.   Neurological:      General: No focal deficit present.      Mental Status: She is alert and oriented to person, place, and time. Mental status is at baseline.      " Cranial Nerves: No cranial nerve deficit.   Psychiatric:         Mood and Affect: Mood normal.         Behavior: Behavior normal.         Thought Content: Thought content normal.         Judgment: Judgment normal.          Results  Laboratory Studies  Sodium levels were low. Kidney function and liver function were normal. Blood sugar was 74. Testosterone levels were normal. Thyroid levels were normal. Vitamin D was slightly low. Cholesterol levels were excellent.    Imaging  CT scan showed ill-defined right upper lobe, increased atelectasis, and a hiatal hernia.      Latest Reference Range & Units 04/23/24 10:07   Sodium 135 - 145 mmol/L 129 (L)   Potassium 3.6 - 5.5 mmol/L 4.6   Chloride 96 - 112 mmol/L 88 (L)   Co2 20 - 33 mmol/L 32   Anion Gap 7.0 - 16.0  9.0   Glucose 65 - 99 mg/dL 74   Bun 8 - 22 mg/dL 9   Creatinine 0.50 - 1.40 mg/dL 0.66   GFR (CKD-EPI) >60 mL/min/1.73 m 2 99   Calcium 8.5 - 10.5 mg/dL 9.5   Correct Calcium 8.5 - 10.5 mg/dL 9.2   AST(SGOT) 12 - 45 U/L 23   ALT(SGPT) 2 - 50 U/L 20   Alkaline Phosphatase 30 - 99 U/L 77   Total Bilirubin 0.1 - 1.5 mg/dL 0.4   Albumin 3.2 - 4.9 g/dL 4.4   Total Protein 6.0 - 8.2 g/dL 7.5   Globulin 1.9 - 3.5 g/dL 3.1   A-G Ratio g/dL 1.4   Fasting Status  Fasting   Cholesterol,Tot 100 - 199 mg/dL 112   Triglycerides 0 - 149 mg/dL 77   HDL >=40 mg/dL 52   LDL <100 mg/dL 45   25-Hydroxy   Vitamin D 25 30 - 100 ng/mL 29 (L)   TSH 0.380 - 5.330 uIU/mL 1.470   Sex Hormone Bind Globulin 17 - 125 nmol/L 39   Testosterone Fr LCMS 0.6 - 3.8 pg/mL 2.7   Testosterone,Total 5 - 32 ng/dL 18   (L): Data is abnormally low      CT-CHEST (THORAX) W/O  Order: [id number]   Status: Final result       Visible to patient: Yes (seen)       Next appt: 07/03/2024 at 09:50 AM in Pulmonary and Sleep Medicine (Nati Benson M.D.)       Dx: Lung nodules    0 Result Notes       1 HM Topic  Details    Reading Physician Reading Date Result Priority   Seamus Mancera M.D.  [phone] 5/8/2024       Narrative & Impression     5/8/2024 11:03 AM     HISTORY/REASON FOR EXAM:  GGO RUL.  Lung nodules. Groundglass opacity     TECHNIQUE/EXAM DESCRIPTION:  CT scan of the chest without contrast.     Noncontrast helical scanning of the chest was obtained from the lung apices through the adrenal glands.     Low dose optimization technique was utilized for this CT exam including automated exposure control and adjustment of the mA and/or kV according to patient size.     COMPARISON: Lung cancer screening CT 1/26/2024     FINDINGS:  Lungs: Probably increased atelectasis/scarring in the right middle lobe and lingula. Benign calcified granuloma in the left upper lobe. Groundglass opacity in the right upper lobe is less conspicuous probably also atelectasis/scarring. Mild to moderate   emphysematous changes.     Mediastinum/Pratibha: No significant adenopathy.     Pleura: No pleural effusion.     Cardiac: Heart normal in size without pericardial effusion. There is coronary artery calcification.     Vascular: Atherosclerosis.     Soft tissues: Unremarkable.     Bones: No acute or destructive process. Chronic mild thoracic and lumbar compression deformities of T3 and L1     Moderate hiatal hernia. There is a hypodense lesion in the right lobe of the liver, grossly stable in size 2021 and probably represents hemangioma. Irregular right hepatic contour again noted which may suggest another possible lesion again similar.     IMPRESSION:     1.  Right upper lobe ill-defined glass opacity is less conspicuous, probably postinflammatory.  2.  Slightly increased atelectasis/scarring in the right middle lobe and lingula.  3.  No new suspicious pulmonary nodule.  4.  Emphysematous changes.  5.  Moderate hiatal hernia.     Fleischner Society pulmonary nodule recommendations:  Not Applicable       Assessment/Plan:     Kristel was seen today for follow-up.    Diagnoses and all orders for this visit:    Moderate episode of recurrent major  depressive disorder (HCC)  -     venlafaxine (EFFEXOR-XR) 150 MG extended-release capsule; Take 1 Capsule by mouth every day.  Generalized anxiety disorder  -     venlafaxine (EFFEXOR-XR) 150 MG extended-release capsule; Take 1 Capsule by mouth every day.    Essential hypertension  Mixed dyslipidemia  Hyponatremia  -     Basic Metabolic Panel; Future    Hirsutism    COPD (chronic obstructive pulmonary disease) with chronic bronchitis (HCC)  Supplemental oxygen dependent    Screening for colorectal cancer  -     COLOGUARD (FIT DNA)        Assessment & Plan  1. Moderate episode of recurrent major depressive disorder and generalized anxiety disorder.  The dosage of Effexor will be escalated to 150 mg daily, with a follow-up appointment scheduled in 3 months.    2. Essential hypertension/mixed dyslipidemia.  The condition is currently under control, as evidenced by the review of the lab results. The patient is advised to continue with the current medication regimen.    3. Hyponatremia.  The hyponatremia is likely a side effect of the Lexapro, which the patient is no longer taking. A repeat of the labs will be conducted in 3 months.    4. Hirsutism.  The possibility of medication was discussed, but the patient declined. The patient will inform us if her symptoms worsen.    5. COPD and is supplemental oxygen dependent.  The patient has an appointment with pulmonary medicine on 07/03/2024 to discuss the CT results.    6. Colorectal cancer.  A Cologuard request will be submitted.    Return in about 3 months (around 8/21/2024), or if symptoms worsen or fail to improve, for effexor and sodium.    Please note that this dictation was created using voice recognition software. I have made every reasonable attempt to correct obvious errors, but expect that there are errors of grammar and possible content that I did not discover before finalizing note.

## 2024-06-19 ENCOUNTER — HOSPITAL ENCOUNTER (OUTPATIENT)
Dept: LAB | Facility: MEDICAL CENTER | Age: 64
End: 2024-06-19
Attending: PHYSICIAN ASSISTANT
Payer: MEDICAID

## 2024-06-19 DIAGNOSIS — E87.1 HYPONATREMIA: ICD-10-CM

## 2024-06-19 LAB
ANION GAP SERPL CALC-SCNC: 10 MMOL/L (ref 7–16)
BUN SERPL-MCNC: 7 MG/DL (ref 8–22)
CALCIUM SERPL-MCNC: 9.2 MG/DL (ref 8.5–10.5)
CHLORIDE SERPL-SCNC: 89 MMOL/L (ref 96–112)
CO2 SERPL-SCNC: 30 MMOL/L (ref 20–33)
CREAT SERPL-MCNC: 0.57 MG/DL (ref 0.5–1.4)
GFR SERPLBLD CREATININE-BSD FMLA CKD-EPI: 102 ML/MIN/1.73 M 2
GLUCOSE SERPL-MCNC: 100 MG/DL (ref 65–99)
POTASSIUM SERPL-SCNC: 4.5 MMOL/L (ref 3.6–5.5)
SODIUM SERPL-SCNC: 129 MMOL/L (ref 135–145)

## 2024-06-19 PROCEDURE — 80048 BASIC METABOLIC PNL TOTAL CA: CPT

## 2024-06-19 PROCEDURE — 36415 COLL VENOUS BLD VENIPUNCTURE: CPT

## 2024-07-03 ENCOUNTER — OFFICE VISIT (OUTPATIENT)
Dept: SLEEP MEDICINE | Facility: MEDICAL CENTER | Age: 64
End: 2024-07-03
Attending: INTERNAL MEDICINE
Payer: MEDICAID

## 2024-07-03 VITALS
OXYGEN SATURATION: 97 % | DIASTOLIC BLOOD PRESSURE: 64 MMHG | HEART RATE: 54 BPM | BODY MASS INDEX: 32.39 KG/M2 | SYSTOLIC BLOOD PRESSURE: 142 MMHG | WEIGHT: 176 LBS | HEIGHT: 62 IN

## 2024-07-03 DIAGNOSIS — R91.8 LUNG NODULES: ICD-10-CM

## 2024-07-03 DIAGNOSIS — J96.11 CHRONIC RESPIRATORY FAILURE WITH HYPOXIA (HCC): ICD-10-CM

## 2024-07-03 DIAGNOSIS — Z78.9 ELECTRONIC CIGARETTE USE: ICD-10-CM

## 2024-07-03 DIAGNOSIS — J44.9 CHRONIC OBSTRUCTIVE PULMONARY DISEASE, UNSPECIFIED COPD TYPE (HCC): ICD-10-CM

## 2024-07-03 DIAGNOSIS — Z87.891 FORMER SMOKER: ICD-10-CM

## 2024-07-03 PROCEDURE — 3078F DIAST BP <80 MM HG: CPT | Performed by: INTERNAL MEDICINE

## 2024-07-03 PROCEDURE — 3077F SYST BP >= 140 MM HG: CPT | Performed by: INTERNAL MEDICINE

## 2024-07-03 PROCEDURE — 99211 OFF/OP EST MAY X REQ PHY/QHP: CPT | Performed by: INTERNAL MEDICINE

## 2024-07-03 PROCEDURE — 99214 OFFICE O/P EST MOD 30 MIN: CPT | Performed by: INTERNAL MEDICINE

## 2024-07-03 RX ORDER — FLUTICASONE FUROATE, UMECLIDINIUM BROMIDE AND VILANTEROL TRIFENATATE 200; 62.5; 25 UG/1; UG/1; UG/1
1 POWDER RESPIRATORY (INHALATION) DAILY
Qty: 2 EACH | Refills: 0 | COMMUNITY
Start: 2024-07-03

## 2024-07-03 RX ORDER — FLUTICASONE FUROATE, UMECLIDINIUM BROMIDE AND VILANTEROL TRIFENATATE 100; 62.5; 25 UG/1; UG/1; UG/1
1 POWDER RESPIRATORY (INHALATION) DAILY
Qty: 2 EACH | Refills: 0 | COMMUNITY
Start: 2024-07-03 | End: 2024-07-03

## 2024-07-03 ASSESSMENT — ENCOUNTER SYMPTOMS
EYE REDNESS: 0
HEMOPTYSIS: 0
ORTHOPNEA: 0
SORE THROAT: 0
NECK PAIN: 0
HEADACHES: 0
DIZZINESS: 0
DOUBLE VISION: 0
WEIGHT LOSS: 0
BLURRED VISION: 0
CONSTIPATION: 0
CLAUDICATION: 0
FEVER: 0
SPUTUM PRODUCTION: 0
EYE DISCHARGE: 0
PALPITATIONS: 0
EYE PAIN: 0
DEPRESSION: 0
WEAKNESS: 0
DIARRHEA: 0
PHOTOPHOBIA: 0
HEARTBURN: 0
CHILLS: 0
ABDOMINAL PAIN: 0
SHORTNESS OF BREATH: 0
VOMITING: 0
SPEECH CHANGE: 0
FOCAL WEAKNESS: 0
SINUS PAIN: 0
STRIDOR: 0
MYALGIAS: 0
BACK PAIN: 0
WHEEZING: 0
TREMORS: 0
COUGH: 0
FALLS: 0
PND: 0
DIAPHORESIS: 0
NAUSEA: 0

## 2024-07-03 ASSESSMENT — FIBROSIS 4 INDEX: FIB4 SCORE: 1.386362146049869612

## 2024-07-10 ENCOUNTER — TELEPHONE (OUTPATIENT)
Dept: HEALTH INFORMATION MANAGEMENT | Facility: OTHER | Age: 64
End: 2024-07-10
Payer: MEDICAID

## 2024-07-31 ENCOUNTER — TELEPHONE (OUTPATIENT)
Dept: VASCULAR LAB | Facility: MEDICAL CENTER | Age: 64
End: 2024-07-31
Payer: MEDICAID

## 2024-08-08 ENCOUNTER — TELEPHONE (OUTPATIENT)
Dept: HEALTH INFORMATION MANAGEMENT | Facility: OTHER | Age: 64
End: 2024-08-08
Payer: MEDICAID

## 2024-08-12 DIAGNOSIS — E78.2 MIXED DYSLIPIDEMIA: ICD-10-CM

## 2024-08-12 RX ORDER — ATORVASTATIN CALCIUM 20 MG/1
20 TABLET, FILM COATED ORAL EVERY EVENING
Qty: 90 TABLET | Refills: 0 | Status: CANCELLED | OUTPATIENT
Start: 2024-08-12

## 2024-08-13 DIAGNOSIS — E78.2 MIXED DYSLIPIDEMIA: ICD-10-CM

## 2024-08-13 RX ORDER — ATORVASTATIN CALCIUM 20 MG/1
20 TABLET, FILM COATED ORAL EVERY EVENING
Qty: 90 TABLET | Refills: 1 | Status: SHIPPED | OUTPATIENT
Start: 2024-08-13

## 2024-08-13 NOTE — TELEPHONE ENCOUNTER
Requested Prescriptions     Pending Prescriptions Disp Refills    atorvastatin (LIPITOR) 20 MG Tab 90 Tablet 3     Sig: Take 1 Tablet by mouth every evening.      Last office visit: 5/21/24  Last lab:4/23/24

## 2024-08-14 DIAGNOSIS — G25.0 BENIGN ESSENTIAL TREMOR: ICD-10-CM

## 2024-08-14 RX ORDER — PROPRANOLOL HCL 60 MG
60 CAPSULE, EXTENDED RELEASE 24HR ORAL DAILY
Qty: 90 CAPSULE | Refills: 0 | Status: SHIPPED | OUTPATIENT
Start: 2024-08-14

## 2024-08-14 NOTE — TELEPHONE ENCOUNTER
Requested Prescriptions     Pending Prescriptions Disp Refills    propranolol LA (INDERAL LA) 60 MG CAPSULE SR 24 HR 90 Capsule 3     Sig: Take 1 Capsule by mouth every day.      Last office visit: 5/21/24  Last lab: 4/23/24

## 2024-11-13 DIAGNOSIS — J44.9 CHRONIC OBSTRUCTIVE PULMONARY DISEASE, UNSPECIFIED COPD TYPE (HCC): ICD-10-CM

## 2024-11-14 ENCOUNTER — PATIENT MESSAGE (OUTPATIENT)
Dept: SLEEP MEDICINE | Facility: MEDICAL CENTER | Age: 64
End: 2024-11-14
Payer: MEDICAID

## 2024-11-14 DIAGNOSIS — J44.89 COPD (CHRONIC OBSTRUCTIVE PULMONARY DISEASE) WITH CHRONIC BRONCHITIS (HCC): ICD-10-CM

## 2024-11-14 RX ORDER — FLUTICASONE FUROATE, UMECLIDINIUM BROMIDE AND VILANTEROL TRIFENATATE 200; 62.5; 25 UG/1; UG/1; UG/1
1 POWDER RESPIRATORY (INHALATION) DAILY
Qty: 3 EACH | Refills: 3 | Status: SHIPPED | OUTPATIENT
Start: 2024-11-14 | End: 2024-11-20

## 2024-11-14 RX ORDER — ALBUTEROL SULFATE 90 UG/1
2 INHALANT RESPIRATORY (INHALATION) EVERY 6 HOURS PRN
Qty: 18 G | Refills: 6 | Status: SHIPPED | OUTPATIENT
Start: 2024-11-14

## 2024-11-15 ENCOUNTER — TELEPHONE (OUTPATIENT)
Dept: PHARMACY | Facility: MEDICAL CENTER | Age: 64
End: 2024-11-15
Payer: MEDICAID

## 2024-11-15 DIAGNOSIS — J44.9 CHRONIC OBSTRUCTIVE PULMONARY DISEASE, UNSPECIFIED COPD TYPE (HCC): ICD-10-CM

## 2024-11-15 NOTE — TELEPHONE ENCOUNTER
Prior Authorization for fluticasone-umeclidinium-vilanterol (TRELEGY ELLIPTA) 200-62.5-25 mcg/act inhaler  (Quantity: 60, Days: 30) has been submitted via Cover My Meds: Key ( [id number])    Insurance: Beth    Will follow up in 24-48 business hours.

## 2024-11-15 NOTE — TELEPHONE ENCOUNTER
Received [employer] [place] request via [employer]  for fluticasone-umeclidinium-vilanterol (TRELEGY ELLIPTA) 200-62.5-25 mcg/act inhaler . (Quantity:60, Day Supply:30)     Insurance: Beth  Member ID:  [id number]  BIN: [id number]  PCN: [id number]  [id number]     Ran Test claim via [place] & medication Rejects stating prior authorization is required.

## 2024-11-15 NOTE — TELEPHONE ENCOUNTER
Prior Authorization for fluticasone-umeclidinium-vilanterol (TRELEGY ELLIPTA) 200-62.5-25 mcg/act inhaler  has been denied for a quantity of 60 , day supply 30    Prior authorization was denied per the following: Pt must try and fail a preferred alternative       PA Denial letter scanned into media.      Prior Authorization denial reference number: [id number]    Insurance: Beth      Please let me know how you would like to proceed.

## 2024-11-16 ENCOUNTER — APPOINTMENT (OUTPATIENT)
Dept: RADIOLOGY | Facility: MEDICAL CENTER | Age: 64
End: 2024-11-16
Attending: EMERGENCY MEDICINE
Payer: MEDICAID

## 2024-11-16 ENCOUNTER — HOSPITAL ENCOUNTER (OUTPATIENT)
Facility: MEDICAL CENTER | Age: 64
End: 2024-11-17
Attending: EMERGENCY MEDICINE | Admitting: INTERNAL MEDICINE
Payer: MEDICAID

## 2024-11-16 ENCOUNTER — APPOINTMENT (OUTPATIENT)
Dept: RADIOLOGY | Facility: MEDICAL CENTER | Age: 64
End: 2024-11-16
Payer: MEDICAID

## 2024-11-16 DIAGNOSIS — R79.89 ELEVATED TROPONIN: ICD-10-CM

## 2024-11-16 DIAGNOSIS — G51.0 BELL'S PALSY: ICD-10-CM

## 2024-11-16 DIAGNOSIS — R20.0 LEFT ARM NUMBNESS: ICD-10-CM

## 2024-11-16 DIAGNOSIS — R29.810 FACIAL DROOP: ICD-10-CM

## 2024-11-16 DIAGNOSIS — R03.0 ELEVATED BLOOD PRESSURE READING: ICD-10-CM

## 2024-11-16 PROBLEM — K21.9 GASTROESOPHAGEAL REFLUX DISEASE: Status: RESOLVED | Noted: 2021-12-08 | Resolved: 2024-11-16

## 2024-11-16 PROBLEM — R20.2 NUMBNESS AND TINGLING IN LEFT ARM: Status: ACTIVE | Noted: 2024-11-16

## 2024-11-16 PROBLEM — I16.0 HYPERTENSIVE URGENCY: Status: ACTIVE | Noted: 2024-11-16

## 2024-11-16 LAB
ABO + RH BLD: NORMAL
ABO GROUP BLD: NORMAL
ALBUMIN SERPL BCP-MCNC: 3.9 G/DL (ref 3.2–4.9)
ALBUMIN/GLOB SERPL: 1.2 G/DL
ALP SERPL-CCNC: 74 U/L (ref 30–99)
ALT SERPL-CCNC: 25 U/L (ref 2–50)
ANION GAP SERPL CALC-SCNC: 11 MMOL/L (ref 7–16)
APTT PPP: 26 SEC (ref 24.7–36)
AST SERPL-CCNC: 28 U/L (ref 12–45)
BASOPHILS # BLD AUTO: 0.9 % (ref 0–1.8)
BASOPHILS # BLD: 0.07 K/UL (ref 0–0.12)
BILIRUB SERPL-MCNC: 0.3 MG/DL (ref 0.1–1.5)
BLD GP AB SCN SERPL QL: NORMAL
BUN SERPL-MCNC: 9 MG/DL (ref 8–22)
CALCIUM ALBUM COR SERPL-MCNC: 9 MG/DL (ref 8.5–10.5)
CALCIUM SERPL-MCNC: 8.9 MG/DL (ref 8.5–10.5)
CHLORIDE SERPL-SCNC: 94 MMOL/L (ref 96–112)
CO2 SERPL-SCNC: 29 MMOL/L (ref 20–33)
CREAT SERPL-MCNC: 0.85 MG/DL (ref 0.5–1.4)
EKG IMPRESSION: NORMAL
EOSINOPHIL # BLD AUTO: 0.37 K/UL (ref 0–0.51)
EOSINOPHIL NFR BLD: 5 % (ref 0–6.9)
ERYTHROCYTE [DISTWIDTH] IN BLOOD BY AUTOMATED COUNT: 44.4 FL (ref 35.9–50)
EST. AVERAGE GLUCOSE BLD GHB EST-MCNC: 108 MG/DL
GFR SERPLBLD CREATININE-BSD FMLA CKD-EPI: 77 ML/MIN/1.73 M 2
GLOBULIN SER CALC-MCNC: 3.3 G/DL (ref 1.9–3.5)
GLUCOSE SERPL-MCNC: 88 MG/DL (ref 65–99)
HBA1C MFR BLD: 5.4 % (ref 4–5.6)
HCT VFR BLD AUTO: 35.8 % (ref 37–47)
HGB BLD-MCNC: 11.7 G/DL (ref 12–16)
IMM GRANULOCYTES # BLD AUTO: 0.02 K/UL (ref 0–0.11)
IMM GRANULOCYTES NFR BLD AUTO: 0.3 % (ref 0–0.9)
INR PPP: 0.96 (ref 0.87–1.13)
LYMPHOCYTES # BLD AUTO: 1.63 K/UL (ref 1–4.8)
LYMPHOCYTES NFR BLD: 22.1 % (ref 22–41)
MCH RBC QN AUTO: 29.4 PG (ref 27–33)
MCHC RBC AUTO-ENTMCNC: 32.7 G/DL (ref 32.2–35.5)
MCV RBC AUTO: 89.9 FL (ref 81.4–97.8)
MONOCYTES # BLD AUTO: 0.62 K/UL (ref 0–0.85)
MONOCYTES NFR BLD AUTO: 8.4 % (ref 0–13.4)
NEUTROPHILS # BLD AUTO: 4.68 K/UL (ref 1.82–7.42)
NEUTROPHILS NFR BLD: 63.3 % (ref 44–72)
NRBC # BLD AUTO: 0 K/UL
NRBC BLD-RTO: 0 /100 WBC (ref 0–0.2)
PLATELET # BLD AUTO: 283 K/UL (ref 164–446)
PMV BLD AUTO: 10.6 FL (ref 9–12.9)
POTASSIUM SERPL-SCNC: 4.6 MMOL/L (ref 3.6–5.5)
PROT SERPL-MCNC: 7.2 G/DL (ref 6–8.2)
PROTHROMBIN TIME: 12.8 SEC (ref 12–14.6)
RBC # BLD AUTO: 3.98 M/UL (ref 4.2–5.4)
RH BLD: NORMAL
SODIUM SERPL-SCNC: 134 MMOL/L (ref 135–145)
TROPONIN T SERPL-MCNC: 22 NG/L (ref 6–19)
TROPONIN T SERPL-MCNC: 32 NG/L (ref 6–19)
TSH SERPL DL<=0.005 MIU/L-ACNC: 2.62 UIU/ML (ref 0.38–5.33)
WBC # BLD AUTO: 7.4 K/UL (ref 4.8–10.8)

## 2024-11-16 PROCEDURE — 84484 ASSAY OF TROPONIN QUANT: CPT

## 2024-11-16 PROCEDURE — G0378 HOSPITAL OBSERVATION PER HR: HCPCS

## 2024-11-16 PROCEDURE — 94760 N-INVAS EAR/PLS OXIMETRY 1: CPT

## 2024-11-16 PROCEDURE — 85025 COMPLETE CBC W/AUTO DIFF WBC: CPT

## 2024-11-16 PROCEDURE — 99222 1ST HOSP IP/OBS MODERATE 55: CPT

## 2024-11-16 PROCEDURE — 70498 CT ANGIOGRAPHY NECK: CPT

## 2024-11-16 PROCEDURE — 70496 CT ANGIOGRAPHY HEAD: CPT

## 2024-11-16 PROCEDURE — 85610 PROTHROMBIN TIME: CPT

## 2024-11-16 PROCEDURE — 96375 TX/PRO/DX INJ NEW DRUG ADDON: CPT | Mod: XU

## 2024-11-16 PROCEDURE — 36415 COLL VENOUS BLD VENIPUNCTURE: CPT

## 2024-11-16 PROCEDURE — 0042T CT-CEREBRAL PERFUSION ANALYSIS: CPT

## 2024-11-16 PROCEDURE — 96374 THER/PROPH/DIAG INJ IV PUSH: CPT | Mod: XU

## 2024-11-16 PROCEDURE — 700117 HCHG RX CONTRAST REV CODE 255: Mod: UD | Performed by: EMERGENCY MEDICINE

## 2024-11-16 PROCEDURE — 80053 COMPREHEN METABOLIC PANEL: CPT

## 2024-11-16 PROCEDURE — 70551 MRI BRAIN STEM W/O DYE: CPT

## 2024-11-16 PROCEDURE — 700102 HCHG RX REV CODE 250 W/ 637 OVERRIDE(OP): Mod: UD

## 2024-11-16 PROCEDURE — A9270 NON-COVERED ITEM OR SERVICE: HCPCS | Mod: UD | Performed by: EMERGENCY MEDICINE

## 2024-11-16 PROCEDURE — 83036 HEMOGLOBIN GLYCOSYLATED A1C: CPT

## 2024-11-16 PROCEDURE — 86900 BLOOD TYPING SEROLOGIC ABO: CPT

## 2024-11-16 PROCEDURE — 99285 EMERGENCY DEPT VISIT HI MDM: CPT

## 2024-11-16 PROCEDURE — 70450 CT HEAD/BRAIN W/O DYE: CPT

## 2024-11-16 PROCEDURE — 700102 HCHG RX REV CODE 250 W/ 637 OVERRIDE(OP): Mod: UD | Performed by: EMERGENCY MEDICINE

## 2024-11-16 PROCEDURE — 85730 THROMBOPLASTIN TIME PARTIAL: CPT

## 2024-11-16 PROCEDURE — 93005 ELECTROCARDIOGRAM TRACING: CPT | Performed by: EMERGENCY MEDICINE

## 2024-11-16 PROCEDURE — 71045 X-RAY EXAM CHEST 1 VIEW: CPT

## 2024-11-16 PROCEDURE — 86850 RBC ANTIBODY SCREEN: CPT

## 2024-11-16 PROCEDURE — A9270 NON-COVERED ITEM OR SERVICE: HCPCS | Mod: UD

## 2024-11-16 PROCEDURE — 86901 BLOOD TYPING SEROLOGIC RH(D): CPT

## 2024-11-16 PROCEDURE — 700111 HCHG RX REV CODE 636 W/ 250 OVERRIDE (IP): Mod: JZ,UD | Performed by: EMERGENCY MEDICINE

## 2024-11-16 PROCEDURE — 84443 ASSAY THYROID STIM HORMONE: CPT

## 2024-11-16 RX ORDER — PROPRANOLOL HYDROCHLORIDE 60 MG/1
60 CAPSULE, EXTENDED RELEASE ORAL EVERY EVENING
Status: DISCONTINUED | OUTPATIENT
Start: 2024-11-16 | End: 2024-11-17 | Stop reason: HOSPADM

## 2024-11-16 RX ORDER — PREDNISONE 20 MG/1
60 TABLET ORAL DAILY
Status: DISCONTINUED | OUTPATIENT
Start: 2024-11-17 | End: 2024-11-17 | Stop reason: HOSPADM

## 2024-11-16 RX ORDER — FLUTICASONE PROPIONATE AND SALMETEROL XINAFOATE 230; 21 UG/1; UG/1
2 AEROSOL, METERED RESPIRATORY (INHALATION) 2 TIMES DAILY
Status: DISCONTINUED | OUTPATIENT
Start: 2024-11-16 | End: 2024-11-16

## 2024-11-16 RX ORDER — VALACYCLOVIR HYDROCHLORIDE 500 MG/1
1000 TABLET, FILM COATED ORAL ONCE
Status: COMPLETED | OUTPATIENT
Start: 2024-11-16 | End: 2024-11-16

## 2024-11-16 RX ORDER — HYDRALAZINE HYDROCHLORIDE 20 MG/ML
10 INJECTION INTRAMUSCULAR; INTRAVENOUS ONCE
Status: COMPLETED | OUTPATIENT
Start: 2024-11-16 | End: 2024-11-16

## 2024-11-16 RX ORDER — HYDROXYZINE HYDROCHLORIDE 25 MG/1
25 TABLET, FILM COATED ORAL 3 TIMES DAILY PRN
Status: DISCONTINUED | OUTPATIENT
Start: 2024-11-16 | End: 2024-11-17 | Stop reason: HOSPADM

## 2024-11-16 RX ORDER — ASPIRIN 81 MG/1
81 TABLET ORAL EVERY EVENING
COMMUNITY

## 2024-11-16 RX ORDER — LORAZEPAM 2 MG/ML
0.5 INJECTION INTRAMUSCULAR ONCE
Status: COMPLETED | OUTPATIENT
Start: 2024-11-16 | End: 2024-11-16

## 2024-11-16 RX ORDER — PREDNISONE 20 MG/1
60 TABLET ORAL ONCE
Status: COMPLETED | OUTPATIENT
Start: 2024-11-16 | End: 2024-11-16

## 2024-11-16 RX ORDER — ALPRAZOLAM 0.25 MG/1
0.25 TABLET ORAL 3 TIMES DAILY PRN
Status: DISCONTINUED | OUTPATIENT
Start: 2024-11-16 | End: 2024-11-17 | Stop reason: HOSPADM

## 2024-11-16 RX ORDER — ONDANSETRON 2 MG/ML
4 INJECTION INTRAMUSCULAR; INTRAVENOUS EVERY 4 HOURS PRN
Status: DISCONTINUED | OUTPATIENT
Start: 2024-11-16 | End: 2024-11-17 | Stop reason: HOSPADM

## 2024-11-16 RX ORDER — PROMETHAZINE HYDROCHLORIDE 25 MG/1
12.5-25 TABLET ORAL EVERY 4 HOURS PRN
Status: DISCONTINUED | OUTPATIENT
Start: 2024-11-16 | End: 2024-11-17 | Stop reason: HOSPADM

## 2024-11-16 RX ORDER — LISINOPRIL 10 MG/1
10 TABLET ORAL EVERY EVENING
Status: DISCONTINUED | OUTPATIENT
Start: 2024-11-16 | End: 2024-11-17 | Stop reason: HOSPADM

## 2024-11-16 RX ORDER — PROMETHAZINE HYDROCHLORIDE 25 MG/1
12.5-25 SUPPOSITORY RECTAL EVERY 4 HOURS PRN
Status: DISCONTINUED | OUTPATIENT
Start: 2024-11-16 | End: 2024-11-17 | Stop reason: HOSPADM

## 2024-11-16 RX ORDER — ACETAMINOPHEN 325 MG/1
650 TABLET ORAL EVERY 6 HOURS PRN
Status: DISCONTINUED | OUTPATIENT
Start: 2024-11-16 | End: 2024-11-17 | Stop reason: HOSPADM

## 2024-11-16 RX ORDER — VENLAFAXINE HYDROCHLORIDE 75 MG/1
150 CAPSULE, EXTENDED RELEASE ORAL DAILY
Status: DISCONTINUED | OUTPATIENT
Start: 2024-11-17 | End: 2024-11-17 | Stop reason: HOSPADM

## 2024-11-16 RX ORDER — ATORVASTATIN CALCIUM 20 MG/1
20 TABLET, FILM COATED ORAL EVERY EVENING
Status: DISCONTINUED | OUTPATIENT
Start: 2024-11-16 | End: 2024-11-17 | Stop reason: HOSPADM

## 2024-11-16 RX ORDER — ALBUTEROL SULFATE 90 UG/1
2 INHALANT RESPIRATORY (INHALATION) EVERY 6 HOURS PRN
Status: DISCONTINUED | OUTPATIENT
Start: 2024-11-16 | End: 2024-11-17 | Stop reason: HOSPADM

## 2024-11-16 RX ORDER — HYDRALAZINE HYDROCHLORIDE 20 MG/ML
10 INJECTION INTRAMUSCULAR; INTRAVENOUS EVERY 4 HOURS PRN
Status: DISCONTINUED | OUTPATIENT
Start: 2024-11-16 | End: 2024-11-17 | Stop reason: HOSPADM

## 2024-11-16 RX ORDER — IPRATROPIUM BROMIDE AND ALBUTEROL SULFATE 2.5; .5 MG/3ML; MG/3ML
3 SOLUTION RESPIRATORY (INHALATION)
Status: DISCONTINUED | OUTPATIENT
Start: 2024-11-16 | End: 2024-11-17 | Stop reason: HOSPADM

## 2024-11-16 RX ORDER — PROCHLORPERAZINE EDISYLATE 5 MG/ML
5-10 INJECTION INTRAMUSCULAR; INTRAVENOUS EVERY 4 HOURS PRN
Status: DISCONTINUED | OUTPATIENT
Start: 2024-11-16 | End: 2024-11-17 | Stop reason: HOSPADM

## 2024-11-16 RX ORDER — ASPIRIN 81 MG/1
81 TABLET ORAL EVERY EVENING
Status: DISCONTINUED | OUTPATIENT
Start: 2024-11-16 | End: 2024-11-17 | Stop reason: HOSPADM

## 2024-11-16 RX ORDER — HYDROCHLOROTHIAZIDE 12.5 MG/1
12.5 TABLET ORAL EVERY EVENING
Status: DISCONTINUED | OUTPATIENT
Start: 2024-11-16 | End: 2024-11-17 | Stop reason: HOSPADM

## 2024-11-16 RX ORDER — ONDANSETRON 4 MG/1
4 TABLET, ORALLY DISINTEGRATING ORAL EVERY 4 HOURS PRN
Status: DISCONTINUED | OUTPATIENT
Start: 2024-11-16 | End: 2024-11-17 | Stop reason: HOSPADM

## 2024-11-16 RX ORDER — ACYCLOVIR 200 MG/1
400 CAPSULE ORAL
Status: DISCONTINUED | OUTPATIENT
Start: 2024-11-16 | End: 2024-11-17 | Stop reason: HOSPADM

## 2024-11-16 RX ADMIN — IOHEXOL 80 ML: 350 INJECTION, SOLUTION INTRAVENOUS at 14:30

## 2024-11-16 RX ADMIN — HYDRALAZINE HYDROCHLORIDE 10 MG: 20 INJECTION INTRAMUSCULAR; INTRAVENOUS at 16:14

## 2024-11-16 RX ADMIN — ATORVASTATIN CALCIUM 20 MG: 20 TABLET, FILM COATED ORAL at 17:33

## 2024-11-16 RX ADMIN — ACYCLOVIR 400 MG: 200 CAPSULE ORAL at 19:00

## 2024-11-16 RX ADMIN — VALACYCLOVIR 1000 MG: 500 TABLET, FILM COATED ORAL at 16:10

## 2024-11-16 RX ADMIN — PREDNISONE 60 MG: 20 TABLET ORAL at 15:58

## 2024-11-16 RX ADMIN — RIVAROXABAN 10 MG: 10 TABLET, FILM COATED ORAL at 17:33

## 2024-11-16 RX ADMIN — LORAZEPAM 0.5 MG: 2 INJECTION INTRAMUSCULAR; INTRAVENOUS at 15:58

## 2024-11-16 RX ADMIN — ASPIRIN 81 MG: 81 TABLET, COATED ORAL at 17:33

## 2024-11-16 RX ADMIN — LISINOPRIL 10 MG: 10 TABLET ORAL at 17:34

## 2024-11-16 RX ADMIN — ALBUTEROL SULFATE 2 PUFF: 90 AEROSOL, METERED RESPIRATORY (INHALATION) at 17:39

## 2024-11-16 RX ADMIN — HYDROCHLOROTHIAZIDE 12.5 MG: 12.5 TABLET ORAL at 17:33

## 2024-11-16 RX ADMIN — IOHEXOL 40 ML: 350 INJECTION, SOLUTION INTRAVENOUS at 14:30

## 2024-11-16 RX ADMIN — PROPRANOLOL HYDROCHLORIDE 60 MG: 60 CAPSULE, EXTENDED RELEASE ORAL at 19:00

## 2024-11-16 SDOH — ECONOMIC STABILITY: TRANSPORTATION INSECURITY
IN THE PAST 12 MONTHS, HAS LACK OF RELIABLE TRANSPORTATION KEPT YOU FROM MEDICAL APPOINTMENTS, MEETINGS, WORK OR FROM GETTING THINGS NEEDED FOR DAILY LIVING?: NO

## 2024-11-16 SDOH — ECONOMIC STABILITY: TRANSPORTATION INSECURITY
IN THE PAST 12 MONTHS, HAS THE LACK OF TRANSPORTATION KEPT YOU FROM MEDICAL APPOINTMENTS OR FROM GETTING MEDICATIONS?: NO

## 2024-11-16 ASSESSMENT — LIFESTYLE VARIABLES
EVER FELT BAD OR GUILTY ABOUT YOUR DRINKING: NO
DOES PATIENT WANT TO STOP DRINKING: NO
AVERAGE NUMBER OF DAYS PER WEEK YOU HAVE A DRINK CONTAINING ALCOHOL: 7
ON A TYPICAL DAY WHEN YOU DRINK ALCOHOL HOW MANY DRINKS DO YOU HAVE: 2
HAVE YOU EVER FELT YOU SHOULD CUT DOWN ON YOUR DRINKING: NO
ALCOHOL_USE: YES
TOTAL SCORE: 0
CONSUMPTION TOTAL: POSITIVE
HOW MANY TIMES IN THE PAST YEAR HAVE YOU HAD 5 OR MORE DRINKS IN A DAY: 0
HAVE PEOPLE ANNOYED YOU BY CRITICIZING YOUR DRINKING: NO
TOTAL SCORE: 0
TOTAL SCORE: 0
EVER HAD A DRINK FIRST THING IN THE MORNING TO STEADY YOUR NERVES TO GET RID OF A HANGOVER: NO

## 2024-11-16 ASSESSMENT — ENCOUNTER SYMPTOMS
PSYCHIATRIC NEGATIVE: 1
MUSCULOSKELETAL NEGATIVE: 1
BLURRED VISION: 0
ABDOMINAL PAIN: 0
VOMITING: 0
NAUSEA: 0
CARDIOVASCULAR NEGATIVE: 1
TINGLING: 1
RESPIRATORY NEGATIVE: 1
GASTROINTESTINAL NEGATIVE: 1
EYES NEGATIVE: 1
SENSORY CHANGE: 1
LOSS OF CONSCIOUSNESS: 0
SHORTNESS OF BREATH: 0
DOUBLE VISION: 0
FOCAL WEAKNESS: 0
CONSTITUTIONAL NEGATIVE: 1

## 2024-11-16 ASSESSMENT — PATIENT HEALTH QUESTIONNAIRE - PHQ9
SUM OF ALL RESPONSES TO PHQ9 QUESTIONS 1 AND 2: 0
2. FEELING DOWN, DEPRESSED, IRRITABLE, OR HOPELESS: NOT AT ALL
1. LITTLE INTEREST OR PLEASURE IN DOING THINGS: NOT AT ALL

## 2024-11-16 ASSESSMENT — SOCIAL DETERMINANTS OF HEALTH (SDOH)
IN THE PAST 12 MONTHS, HAS THE ELECTRIC, GAS, OIL, OR WATER COMPANY THREATENED TO SHUT OFF SERVICE IN YOUR HOME?: NO
WITHIN THE LAST YEAR, HAVE TO BEEN RAPED OR FORCED TO HAVE ANY KIND OF SEXUAL ACTIVITY BY YOUR PARTNER OR EX-PARTNER?: NO
WITHIN THE LAST YEAR, HAVE YOU BEEN HUMILIATED OR EMOTIONALLY ABUSED IN OTHER WAYS BY YOUR PARTNER OR EX-PARTNER?: NO
WITHIN THE PAST 12 MONTHS, YOU WORRIED THAT YOUR FOOD WOULD RUN OUT BEFORE YOU GOT THE MONEY TO BUY MORE: NEVER TRUE
WITHIN THE LAST YEAR, HAVE YOU BEEN KICKED, HIT, SLAPPED, OR OTHERWISE PHYSICALLY HURT BY YOUR PARTNER OR EX-PARTNER?: NO
WITHIN THE PAST 12 MONTHS, THE FOOD YOU BOUGHT JUST DIDN'T LAST AND YOU DIDN'T HAVE MONEY TO GET MORE: NEVER TRUE
WITHIN THE LAST YEAR, HAVE YOU BEEN AFRAID OF YOUR PARTNER OR EX-PARTNER?: NO

## 2024-11-16 ASSESSMENT — FIBROSIS 4 INDEX
FIB4 SCORE: 1.386362146049869612
FIB4 SCORE: 1.23

## 2024-11-16 ASSESSMENT — PAIN DESCRIPTION - PAIN TYPE: TYPE: ACUTE PAIN

## 2024-11-16 NOTE — ED PROVIDER NOTES
ED Provider Note    CHIEF COMPLAINT  Chief Complaint   Patient presents with    Possible Stroke     LKW 0900, L sided numbness/droop, L side headache, L sided arm weakness relieved currently, Rx Aspirin, high BP with /81, 3L O2 baseline, glucose 112, BBB on EMS EKG       EXTERNAL RECORDS REVIEWED  Other none    HPI/ROS  LIMITATION TO HISTORY   Select: : None    OUTSIDE HISTORIAN(S):  EMS per report as below    Kristel Sewell is a 63 y.o. female with history of hypertension, dyslipidemia, COPD on 3 L of home O2, GERD, and anxiety who presents for evaluation of strokelike symptoms.    Per EMS, the patient was at home when she experienced acute onset at 9 AM of left facial droop, difficulty drinking out of a cup, and left arm numbness with left-sided headache.  The left arm numbness has now resolved.  She continues to have facial symptoms.    Patient states her left arm was numb and is now gone.  She reports left facial numbness and droop.  Patient denies chest pain and shortness of breath.  She denies head trauma.    PAST MEDICAL HISTORY   has a past medical history of COPD (chronic obstructive pulmonary disease) (HCC), Essential hypertension (5/19/2021), GILL (generalized anxiety disorder) (5/19/2021), GERD (gastroesophageal reflux disease), Mild episode of recurrent major depressive disorder (HCC) (5/19/2021), Mixed dyslipidemia (6/2/2021), and Tobacco dependence due to cigarettes.    SURGICAL HISTORY   has a past surgical history that includes primary c section.    FAMILY HISTORY  Family History   Problem Relation Age of Onset    Kidney Disease Mother     Heart Disease Mother     Stroke Mother     Diabetes Mother     Diabetes Father     Kidney Disease Father     Arrythmia Father        SOCIAL HISTORY  Social History     Tobacco Use    Smoking status: Former     Current packs/day: 0.50     Average packs/day: 0.5 packs/day for 40.0 years (20.0 ttl pk-yrs)     Types: Cigarettes     Passive exposure: Past     "Smokeless tobacco: Never   Vaping Use    Vaping status: Every Day    Substances: THC   Substance and Sexual Activity    Alcohol use: Yes     Alcohol/week: 12.6 - 14.4 oz     Types: 21 - 24 Cans of beer per week     Comment: 2/day    Drug use: Yes     Types: Marijuana, Inhaled    Sexual activity: Yes     Partners: Male     Birth control/protection: Post-Menopausal       CURRENT MEDICATIONS  Home Medications       Reviewed by Amrita Walters, Pharmacy Intern (Pharmacy Intern) on 11/16/24 at 1541  Med List Status: Complete     Medication Last Dose Status   albuterol (PROAIR HFA) 108 (90 Base) MCG/ACT Aero Soln inhalation aerosol 11/15/2024 Active   aspirin 81 MG EC tablet 11/15/2024 Active   atorvastatin (LIPITOR) 20 MG Tab 11/15/2024 Active   fluticasone-salmeterol (ADVAIR HFA) 230-21 MCG/ACT inhaler Not Taking Active   fluticasone-umeclidinium-vilanterol (TRELEGY ELLIPTA) 200-62.5-25 mcg/act inhaler Not Taking Active   lisinopril-hydrochlorothiazide (PRINZIDE) 10-12.5 MG per tablet 11/15/2024 Active   propranolol LA (INDERAL LA) 60 MG CAPSULE SR 24 HR 11/15/2024 Active   tiotropium (SPIRIVA RESPIMAT) 2.5 mcg/Act Aero Soln 11/15/2024 Active   venlafaxine (EFFEXOR-XR) 150 MG extended-release capsule 11/16/2024 Active                  Audit from Redirected Encounters    **Home medications have not yet been reviewed for this encounter**         ALLERGIES  Allergies   Allergen Reactions    Penicillins Rash     Reaction occurred when patient was very young       PHYSICAL EXAM  VITAL SIGNS: BP (!) 213/99   Pulse 100   Temp 36.9 °C (98.4 °F) (Temporal)   Resp (!) 24   Ht 1.575 m (5' 2\")   Wt 79.4 kg (175 lb)   SpO2 97%   BMI 32.01 kg/m²    General:  WDWN female, nontoxic appearing but appears anxious; A+Ox3; V/S as above ; elevated blood pressure  Skin: warm and dry; good color; no rash  HEENT: NCAT; left facial droop noted, EOMs intact; PERRL; no scleral icterus   Neck: FROM  Cardiovascular: Regular heart rate and " rhythm.  No murmurs, rubs, or gallops; pulses 2+ bilaterally radially and DP areas  Lungs: No respiratory distress or tachypnea; Clear to auscultation with good air movement bilaterally.  No wheezes, rhonchi, or rales.   Abdomen: BS present; soft; NTND; no rebound, guarding, or rigidity.  No organomegaly or pulsatile mass  Extremities: PANDA x 4; no e/o trauma; no pedal edema; neg Vin's  Neurologic: CNs III-XII formally intact; with exception of decreased strength of closing her left eye and left forehead involvement; speech clear; distal sensation intact; strength 5/5 UE/LEs  Psychiatric: Appropriate affect, anxious mood      EKG/LABS  Results for orders placed or performed during the hospital encounter of 11/16/24   CBC WITH DIFFERENTIAL    Collection Time: 11/16/24  1:42 PM   Result Value Ref Range    WBC 7.4 4.8 - 10.8 K/uL    RBC 3.98 (L) 4.20 - 5.40 M/uL    Hemoglobin 11.7 (L) 12.0 - 16.0 g/dL    Hematocrit 35.8 (L) 37.0 - 47.0 %    MCV 89.9 81.4 - 97.8 fL    MCH 29.4 27.0 - 33.0 pg    MCHC 32.7 32.2 - 35.5 g/dL    RDW 44.4 35.9 - 50.0 fL    Platelet Count 283 164 - 446 K/uL    MPV 10.6 9.0 - 12.9 fL    Neutrophils-Polys 63.30 44.00 - 72.00 %    Lymphocytes 22.10 22.00 - 41.00 %    Monocytes 8.40 0.00 - 13.40 %    Eosinophils 5.00 0.00 - 6.90 %    Basophils 0.90 0.00 - 1.80 %    Immature Granulocytes 0.30 0.00 - 0.90 %    Nucleated RBC 0.00 0.00 - 0.20 /100 WBC    Neutrophils (Absolute) 4.68 1.82 - 7.42 K/uL    Lymphs (Absolute) 1.63 1.00 - 4.80 K/uL    Monos (Absolute) 0.62 0.00 - 0.85 K/uL    Eos (Absolute) 0.37 0.00 - 0.51 K/uL    Baso (Absolute) 0.07 0.00 - 0.12 K/uL    Immature Granulocytes (abs) 0.02 0.00 - 0.11 K/uL    NRBC (Absolute) 0.00 K/uL   COMP METABOLIC PANEL    Collection Time: 11/16/24  1:42 PM   Result Value Ref Range    Sodium 134 (L) 135 - 145 mmol/L    Potassium 4.6 3.6 - 5.5 mmol/L    Chloride 94 (L) 96 - 112 mmol/L    Co2 29 20 - 33 mmol/L    Anion Gap 11.0 7.0 - 16.0    Glucose 88 65 -  99 mg/dL    Bun 9 8 - 22 mg/dL    Creatinine 0.85 0.50 - 1.40 mg/dL    Calcium 8.9 8.5 - 10.5 mg/dL    Correct Calcium 9.0 8.5 - 10.5 mg/dL    AST(SGOT) 28 12 - 45 U/L    ALT(SGPT) 25 2 - 50 U/L    Alkaline Phosphatase 74 30 - 99 U/L    Total Bilirubin 0.3 0.1 - 1.5 mg/dL    Albumin 3.9 3.2 - 4.9 g/dL    Total Protein 7.2 6.0 - 8.2 g/dL    Globulin 3.3 1.9 - 3.5 g/dL    A-G Ratio 1.2 g/dL   PROTHROMBIN TIME    Collection Time: 24  1:42 PM   Result Value Ref Range    PT 12.8 12.0 - 14.6 sec    INR 0.96 0.87 - 1.13   APTT    Collection Time: 24  1:42 PM   Result Value Ref Range    APTT 26.0 24.7 - 36.0 sec   COD (ADULT)    Collection Time: 24  1:42 PM   Result Value Ref Range    ABO Grouping Only A     Rh Grouping Only NEG     Antibody Screen-Cod NEG    TROPONIN    Collection Time: 24  1:42 PM   Result Value Ref Range    Troponin T 22 (H) 6 - 19 ng/L   ESTIMATED GFR    Collection Time: 24  1:42 PM   Result Value Ref Range    GFR (CKD-EPI) 77 >60 mL/min/1.73 m 2   EKG (NOW)    Collection Time: 24  2:44 PM   Result Value Ref Range    Report       [facility] Emergency Dept.    Test Date:  2024  Pt Name:    FRANC ZALDIVAR              Department: ER  MRN:        [id number]                      Room:       RD 01  Gender:     Female                       Technician: [id number]  :        [id number]                   Requested By:CHEMA ALLEN  Order #:    [id number]                    Reading MD: CHEMA ALLEN MD    Measurements  Intervals                                Axis  Rate:       91                           P:          79  UT:         150                          QRS:        49  QRSD:       152                          T:          73  QT:         404  QTc:        498    Interpretive Statements  Sinus rhythm  Right bundle branch block  Abnormal inferior Q waves  Compared to ECG 2022 20:48:44  Right bundle-branch block now present  Inferior Q waves  "now present  Q waves now present  Incomplete right bundle-branch block no longer present  Electronically Signed On 11- 15:31:37 PST by LICHA ALLEN MD         I have independently interpreted this EKG    RADIOLOGY/PROCEDURES   I have independently interpreted the diagnostic imaging associated with this visit and am waiting the final reading from the radiologist.   My preliminary interpretation is as follows:   CT head shows no intracranial hemorrhage  Chest x-ray shows left lower lobe scarring      Radiologist interpretation:  DX-CHEST-PORTABLE (1 VIEW)   Final Result      Mild left basilar atelectasis versus infiltrate.      CT-CTA NECK WITH & W/O-POST PROCESSING   Final Result         1. Atherosclerosis without significant stenosis, occlusion, or aneurysm.   2. Emphysema.      CT-CTA HEAD WITH & W/O-POST PROCESS   Final Result      Atherosclerosis without significant stenosis, occlusion, or aneurysm.      CT-CEREBRAL PERFUSION ANALYSIS   Final Result      1. Cerebral blood flow less than 30% possibly representing completed infarct = 0 mL.      2. T Max more than 6 seconds possibly representing combination of completed infarct and ischemia = 0 mL.      3. Mismatched volume possibly representing ischemic brain/penumbra= 0 mL      4.  Please note that this cerebral perfusion study and report is Quantitative and targets supratentorial (cerebral) perfusion for evaluation of large vessel territory acute ischemia/infarction. For example, lacunar infarcts, and brainstem/posterior fossa    ischemia/infarction are not evaluated on this study.  Data acquisition is subject to artifacts which can yield non-anatomically plausible perfusion maps which may be due to motion, bolus timing, signal to noise ratio, or other technical factors.    Perfusion map abnormalities which show non-anatomic distributions are likely artifact.   This study is not \"stand-alone\" and should only be utilized for diagnosis, " management/treatment in correlation with CT, CTA, and/or MRI and clinical factors.         CT-HEAD W/O   Final Result      No CT evidence of acute infarct, hemorrhage or mass.               MR-BRAIN-W/O    (Results Pending)   EC-ECHOCARDIOGRAM COMPLETE W/O CONT    (Results Pending)       COURSE & MEDICAL DECISION MAKING    ASSESSMENT, COURSE AND PLAN  Care Narrative: This is a 63-year-old female with history of hypertension, dyslipidemia, and COPD secondary to smoking history on 3 L of home oxygen who arrives with concern for strokelike symptoms.  These symptoms began acutely at 9 AM.  The patient noted left facial droop, left facial numbness, difficulty drinking out of a cup, and left arm numbness which has now resolved.    Patient arrived to the charge desk as a stroke alert for left facial droop and left arm numbness.  Neurology NP present for evaluation.  Bell's palsy versus CVA.    CT imaging reviewed by neurology.  Dr. Villatoro, neurology NP's x 2, and I reevaluated the patient whose symptoms appear to be consistent with a Bell's palsy.  Antiviral and prednisone recommended.    EKG shows no A-fib.    Labs demonstrate sodium 134, troponin 22, mild anemia.  Prior troponin 2 years ago was 17.  Creatinine normal.    Patient was reevaluated.  Patient is quite anxious.  Blood pressure in the 180s.  Patient states she does not recall if her hand was also weak or just numb earlier today.  Patient's daughter and granddaughter are at the bedside.  CT results relayed to them.  Given patient's left arm numbness and continued/significant hypertension, I feel her upper extremity symptoms may have been related to hypertensive urgency.  Patient is amenable to being hospitalized for blood pressure management and possible MRI imaging.  Ativan ordered for patient's current anxiety.    3:39 PM  I discussed the case with the nurse practitioner hospitalist who agrees to hospitalize the patient for further workup and blood pressure  management.          DISPOSITION AND DISCUSSIONS  I have discussed management of the patient with the following physicians and SHASHANK's:    Neurology  NP hospitalist      FINAL DIAGNOSIS  1. Facial droop    2. Left arm numbness    3. Elevated troponin    4. Elevated blood pressure reading         Electronically signed by: May Coats M.D., 11/16/2024 1:47 PM

## 2024-11-16 NOTE — ED TRIAGE NOTES
"Chief Complaint   Patient presents with    Possible Stroke     LKW 0900, L sided numbness/droop, L side headache, L sided arm weakness relieved currently, Rx Aspirin, high BP with /81, 3L O2 baseline, glucose 112, BBB on EMS EKG        BIB [facility] for above complaint. Assessment at charge desk with ERP and Neuro MD. CT ordered, pt roomed to red 1. Report to JESSIKA Madrid     BP (!) 218/101   Pulse 100   Resp (!) 24   Ht 1.575 m (5' 2\")   Wt 79.4 kg (175 lb)   SpO2 96%   BMI 32.01 kg/m²      "

## 2024-11-16 NOTE — ED NOTES
Medication Reconciliation    Medication reconciliation is complete per patient reporting. Patient reports using albuterol inhaler daily and admits struggling with adherence to her Spiriva inhaler.  - Allergies reviewed.  - No outpatient antibiotics in the last 30 days.  - No anticoagulants in the last 14 days.  - Patient's home pharmacy is [facility] [phone].    Amrita Walters, Pharmacy Intern

## 2024-11-16 NOTE — ED NOTES
Erp notified of pt's BP pf 213/99 RN=[id number], family at bedside and asking to talk to physician. Erp made aware.

## 2024-11-16 NOTE — PROGRESS NOTES
Brief Vascular Neurology Note    Patient presenting with left facial droop and subjective left arm weakness. On exam, strength symmetric through all 4 extremities. Left facial droop with left eyelid lag. Stroke protocol imaging negative for acute findings. Exam consistent with a Bell's Palsy. Recommend acyclovir 400mg 5 times a day x 10 days, as well as prednisone 60mg daily x7 days.    There are no other acute neurology recommendations at this time.    Case reviewed and plan created with Dr. Jaleesa Villatoro, Vascular Neurology. Please call with any questions.    JORGE Day  Vascular Neurology, Inpatient Neurology  [phone]

## 2024-11-17 ENCOUNTER — PHARMACY VISIT (OUTPATIENT)
Dept: PHARMACY | Facility: MEDICAL CENTER | Age: 64
End: 2024-11-17
Payer: COMMERCIAL

## 2024-11-17 ENCOUNTER — APPOINTMENT (OUTPATIENT)
Dept: CARDIOLOGY | Facility: MEDICAL CENTER | Age: 64
End: 2024-11-17
Payer: MEDICAID

## 2024-11-17 VITALS
HEART RATE: 73 BPM | WEIGHT: 169.97 LBS | RESPIRATION RATE: 18 BRPM | TEMPERATURE: 97.5 F | HEIGHT: 62 IN | SYSTOLIC BLOOD PRESSURE: 153 MMHG | OXYGEN SATURATION: 97 % | BODY MASS INDEX: 31.28 KG/M2 | DIASTOLIC BLOOD PRESSURE: 67 MMHG

## 2024-11-17 PROBLEM — R20.2 NUMBNESS AND TINGLING IN LEFT ARM: Status: RESOLVED | Noted: 2024-11-16 | Resolved: 2024-11-17

## 2024-11-17 PROBLEM — I16.0 HYPERTENSIVE URGENCY: Status: RESOLVED | Noted: 2024-11-16 | Resolved: 2024-11-17

## 2024-11-17 PROBLEM — R20.0 NUMBNESS AND TINGLING IN LEFT ARM: Status: RESOLVED | Noted: 2024-11-16 | Resolved: 2024-11-17

## 2024-11-17 LAB
AMPHET UR QL SCN: NEGATIVE
ANION GAP SERPL CALC-SCNC: 8 MMOL/L (ref 7–16)
APPEARANCE UR: CLEAR
BARBITURATES UR QL SCN: NEGATIVE
BASOPHILS # BLD AUTO: 0.3 % (ref 0–1.8)
BASOPHILS # BLD: 0.02 K/UL (ref 0–0.12)
BENZODIAZ UR QL SCN: NEGATIVE
BILIRUB UR QL STRIP.AUTO: NEGATIVE
BUN SERPL-MCNC: 9 MG/DL (ref 8–22)
BZE UR QL SCN: NEGATIVE
CALCIUM SERPL-MCNC: 9 MG/DL (ref 8.5–10.5)
CANNABINOIDS UR QL SCN: POSITIVE
CHLORIDE SERPL-SCNC: 94 MMOL/L (ref 96–112)
CO2 SERPL-SCNC: 30 MMOL/L (ref 20–33)
COLOR UR: YELLOW
CREAT SERPL-MCNC: 0.69 MG/DL (ref 0.5–1.4)
EOSINOPHIL # BLD AUTO: 0 K/UL (ref 0–0.51)
EOSINOPHIL NFR BLD: 0 % (ref 0–6.9)
ERYTHROCYTE [DISTWIDTH] IN BLOOD BY AUTOMATED COUNT: 45 FL (ref 35.9–50)
FENTANYL UR QL: NEGATIVE
GFR SERPLBLD CREATININE-BSD FMLA CKD-EPI: 97 ML/MIN/1.73 M 2
GLUCOSE SERPL-MCNC: 145 MG/DL (ref 65–99)
GLUCOSE UR STRIP.AUTO-MCNC: NEGATIVE MG/DL
HCT VFR BLD AUTO: 37 % (ref 37–47)
HGB BLD-MCNC: 11.8 G/DL (ref 12–16)
IMM GRANULOCYTES # BLD AUTO: 0.03 K/UL (ref 0–0.11)
IMM GRANULOCYTES NFR BLD AUTO: 0.5 % (ref 0–0.9)
KETONES UR STRIP.AUTO-MCNC: ABNORMAL MG/DL
LEUKOCYTE ESTERASE UR QL STRIP.AUTO: NEGATIVE
LV EJECT FRACT  99904: 60
LV EJECT FRACT MOD 2C 99903: 76.92
LV EJECT FRACT MOD 4C 99902: 63.44
LV EJECT FRACT MOD BP 99901: 70.96
LYMPHOCYTES # BLD AUTO: 0.7 K/UL (ref 1–4.8)
LYMPHOCYTES NFR BLD: 11.2 % (ref 22–41)
MCH RBC QN AUTO: 28.5 PG (ref 27–33)
MCHC RBC AUTO-ENTMCNC: 31.9 G/DL (ref 32.2–35.5)
MCV RBC AUTO: 89.4 FL (ref 81.4–97.8)
METHADONE UR QL SCN: NEGATIVE
MICRO URNS: ABNORMAL
MONOCYTES # BLD AUTO: 0.1 K/UL (ref 0–0.85)
MONOCYTES NFR BLD AUTO: 1.6 % (ref 0–13.4)
NEUTROPHILS # BLD AUTO: 5.38 K/UL (ref 1.82–7.42)
NEUTROPHILS NFR BLD: 86.4 % (ref 44–72)
NITRITE UR QL STRIP.AUTO: NEGATIVE
NRBC # BLD AUTO: 0 K/UL
NRBC BLD-RTO: 0 /100 WBC (ref 0–0.2)
OPIATES UR QL SCN: NEGATIVE
OXYCODONE UR QL SCN: NEGATIVE
PCP UR QL SCN: NEGATIVE
PH UR STRIP.AUTO: 8 [PH] (ref 5–8)
PLATELET # BLD AUTO: 278 K/UL (ref 164–446)
PMV BLD AUTO: 10.7 FL (ref 9–12.9)
POTASSIUM SERPL-SCNC: 5.2 MMOL/L (ref 3.6–5.5)
PROPOXYPH UR QL SCN: NEGATIVE
PROT UR QL STRIP: NEGATIVE MG/DL
RBC # BLD AUTO: 4.14 M/UL (ref 4.2–5.4)
RBC UR QL AUTO: NEGATIVE
SODIUM SERPL-SCNC: 132 MMOL/L (ref 135–145)
SP GR UR STRIP.AUTO: >1.045
TROPONIN T SERPL-MCNC: 25 NG/L (ref 6–19)
UROBILINOGEN UR STRIP.AUTO-MCNC: 1 EU/DL
WBC # BLD AUTO: 6.2 K/UL (ref 4.8–10.8)

## 2024-11-17 PROCEDURE — A9270 NON-COVERED ITEM OR SERVICE: HCPCS | Mod: UD

## 2024-11-17 PROCEDURE — 93306 TTE W/DOPPLER COMPLETE: CPT | Mod: 26 | Performed by: INTERNAL MEDICINE

## 2024-11-17 PROCEDURE — RXMED WILLOW AMBULATORY MEDICATION CHARGE

## 2024-11-17 PROCEDURE — 81003 URINALYSIS AUTO W/O SCOPE: CPT | Mod: XU

## 2024-11-17 PROCEDURE — 84484 ASSAY OF TROPONIN QUANT: CPT

## 2024-11-17 PROCEDURE — 94664 DEMO&/EVAL PT USE INHALER: CPT

## 2024-11-17 PROCEDURE — 700102 HCHG RX REV CODE 250 W/ 637 OVERRIDE(OP): Mod: UD

## 2024-11-17 PROCEDURE — 36415 COLL VENOUS BLD VENIPUNCTURE: CPT

## 2024-11-17 PROCEDURE — 93306 TTE W/DOPPLER COMPLETE: CPT

## 2024-11-17 PROCEDURE — 80307 DRUG TEST PRSMV CHEM ANLYZR: CPT

## 2024-11-17 PROCEDURE — 85025 COMPLETE CBC W/AUTO DIFF WBC: CPT

## 2024-11-17 PROCEDURE — G0378 HOSPITAL OBSERVATION PER HR: HCPCS

## 2024-11-17 PROCEDURE — 99239 HOSP IP/OBS DSCHRG MGMT >30: CPT | Performed by: INTERNAL MEDICINE

## 2024-11-17 PROCEDURE — 700111 HCHG RX REV CODE 636 W/ 250 OVERRIDE (IP): Mod: UD

## 2024-11-17 PROCEDURE — 80048 BASIC METABOLIC PNL TOTAL CA: CPT

## 2024-11-17 RX ORDER — ACYCLOVIR 200 MG/1
400 CAPSULE ORAL
Qty: 90 CAPSULE | Refills: 0 | Status: SHIPPED | OUTPATIENT
Start: 2024-11-17 | End: 2024-11-26

## 2024-11-17 RX ORDER — CARBOXYMETHYLCELLULOSE SODIUM 5 MG/ML
1 SOLUTION/ DROPS OPHTHALMIC PRN
Qty: 30 ML | Refills: 0 | Status: SHIPPED | OUTPATIENT
Start: 2024-11-17 | End: 2024-12-01

## 2024-11-17 RX ORDER — PREDNISONE 20 MG/1
60 TABLET ORAL DAILY
Qty: 15 TABLET | Refills: 0 | Status: SHIPPED | OUTPATIENT
Start: 2024-11-18 | End: 2024-11-23

## 2024-11-17 RX ADMIN — PREDNISONE 60 MG: 20 TABLET ORAL at 05:07

## 2024-11-17 RX ADMIN — ALBUTEROL SULFATE 2 PUFF: 90 AEROSOL, METERED RESPIRATORY (INHALATION) at 06:32

## 2024-11-17 RX ADMIN — ACETAMINOPHEN 650 MG: 325 TABLET ORAL at 09:49

## 2024-11-17 RX ADMIN — ACYCLOVIR 400 MG: 200 CAPSULE ORAL at 06:30

## 2024-11-17 RX ADMIN — VENLAFAXINE HYDROCHLORIDE 150 MG: 75 CAPSULE, EXTENDED RELEASE ORAL at 05:08

## 2024-11-17 ASSESSMENT — PAIN DESCRIPTION - PAIN TYPE: TYPE: ACUTE PAIN

## 2024-11-17 NOTE — DISCHARGE SUMMARY
Discharge Summary    CHIEF COMPLAINT ON ADMISSION  Chief Complaint   Patient presents with    Possible Stroke     LKW 0900, L sided numbness/droop, L side headache, L sided arm weakness relieved currently, Rx Aspirin, high BP with /81, 3L O2 baseline, glucose 112, BBB on EMS EKG       Reason for Admission  Stroke     Admission Date  11/16/2024    CODE STATUS  Full Code    HPI & HOSPITAL COURSE  Kristel Sewell is a 63 y.o. female with a past medical history of oxygen dependent COPD, hypertension, anxiety, GERD, hyperlipidemia, depression, and benign essential tremor who presented 11/16/2024 with left sided facial numbness and drooping with headache and left-sided arm numbness and tingling.     Patient states that she was in her usual state of health playing with her grandchild when she went to blow raspberries and was unable to control her tongue.  She states that she then went to take a drink of water and missed her mouth.  She subsequently noted a left facial droop with headache and left arm numbness and weakness.  This prompted her to be seen in the emergency department.  She denies any other acute symptomology including nausea, vomiting, chest pain, shortness of breath.  She is anxious.  Of note, she does have a known benign essential tremor of her head and bilateral upper extremities.     Patient was seen and evaluated by neurology for code stroke upon arrival.  She was diagnosed with Bell's palsy.  Recommendation is for p.o. acyclovir 400 mg 5 times daily x 10 days and p.o. prednisone 60 mg daily x 7 days.     In the emergency department CBC and CHEM panel relatively unremarkable.  Coagulation studies normal.  Troponin mildly elevated at 22.  CT head and CT CTA head neck all within normal limits with no acute abnormalities.  Chest x-ray demonstrates mild left basilar atelectasis versus infiltrate.  EKG demonstrates sinus rhythm with right bundle branch block.     Patient examined this a.m.  She is  still having left facial droop but numbness and tingling have since resolved.  I discussed with her neurology's diagnosis of Bell's palsy and treatment recommendations.  Patient is in understanding and agreement with the plan.  I further discussed with her that we would be sending her home with an eye patch and eye drops to prevent any corneal abrasions or injury.    Patient expressed understanding.  All questions answered at this time.  Patient okay to discharge.    Therefore, she is discharged in good and stable condition to home with close outpatient follow-up.    The patient recovered much more quickly than anticipated on admission.    Discharge Date  11/17/24    FOLLOW UP ITEMS POST DISCHARGE  Discharge Instructions per AIDA Jurado.     -You are being prescribed acyclovir to be taken 5 times a day for a total of 10 days.  -You are being prescribed prednisone daily to be taken for a total of 7 days.  -Please wear an eye patch over the left eye to provide any corneal abrasions.     DIET: As Tolerated     ACTIVITY: As Tolerated     DIAGNOSIS: Bell's Palsy     Return to ER if you start experiencing numbness neurologist, shortness breath.    DISCHARGE DIAGNOSES  Principal Problem (Resolved):    Hypertensive urgency (POA: Yes)  Active Problems:    Generalized anxiety disorder (POA: Yes)    COPD (chronic obstructive pulmonary disease) with chronic bronchitis (HCC) (POA: Yes)      Overview: Diagnosis in 2014 and was placed on O2 at night.     Mixed dyslipidemia (POA: Yes)    Benign essential tremor (POA: Yes)    Bell's palsy (POA: Yes)  Resolved Problems:    Gastroesophageal reflux disease (POA: Yes)    Numbness and tingling in left arm (POA: Yes)      FOLLOW UP  Future Appointments   Date Time Provider Department Center   11/27/2024  2:20 PM Berkley Mei P.A.-C. [facility]   1/16/2025  9:00 AM PFT-RM3 [facility] None   1/16/2025 10:10 AM Nati Benosn M.D. [facility] None       MEDICATIONS ON DISCHARGE      Medication List        START taking these medications        Instructions   acyclovir 200 MG Caps  Commonly known as: Zovirax   Take 2 Capsules by mouth 5 Times a Day for 9 days.  Dose: 400 mg     carboxymethylcellulose 0.5 % Soln  Commonly known as: Refresh Tears   Administer 1 Drop into the left eye as needed (Itching) for up to 14 days.  Dose: 1 Drop     predniSONE 20 MG Tabs  Start taking on: November 18, 2024  Commonly known as: Deltasone   Take 3 Tablets by mouth every day for 5 days.  Dose: 60 mg            CONTINUE taking these medications        Instructions   albuterol 108 (90 Base) MCG/ACT Aers inhalation aerosol  Commonly known as: ProAir HFA   Inhale 2 Puffs every 6 hours as needed for Shortness of Breath.  Dose: 2 Puff     aspirin 81 MG EC tablet   Take 81 mg by mouth every evening.  Dose: 81 mg     atorvastatin 20 MG Tabs  Commonly known as: Lipitor   Take 1 Tablet by mouth every evening.  Dose: 20 mg     lisinopril-hydrochlorothiazide 10-12.5 MG per tablet  Commonly known as: Prinzide   Take 1 tablet by mouth once daily  Dose: 1 Tablet     propranolol LA 60 MG Cp24  Commonly known as: Inderal LA   Take 1 Capsule by mouth every day.  Dose: 60 mg     Spiriva Respimat 2.5 MCG/ACT Aers  Generic drug: tiotropium   Inhale 2 Inhalations every day. Assemble and prime.  Dose: 5 mcg     venlafaxine 150 MG extended-release capsule  Commonly known as: Effexor-XR   Take 1 Capsule by mouth every day.  Dose: 150 mg            ASK your doctor about these medications        Instructions   fluticasone-salmeterol 230-21 MCG/ACT inhaler  Commonly known as: Advair HFA   Inhale 2 Puffs 2 times a day. Use with spacer.  Rinse mouth after each use.  Dose: 2 Puff     Trelegy Ellipta 200-62.5-25 MCG/ACT inhaler  Generic drug: fluticasone-umeclidinium-vilanterol   Doctor's comments: sample  Inhale 1 Puff every day.  Dose: 1 Puff              Allergies  Allergies   Allergen Reactions    Penicillins Rash     Reaction occurred  when patient was very young       DIET  Orders Placed This Encounter   Procedures    Diet Order Diet: Cardiac     Standing Status:   Standing     Number of Occurrences:   1     Order Specific Question:   Diet:     Answer:   Cardiac [6]       ACTIVITY  As tolerated.  Weight bearing as tolerated    CONSULTATIONS  Neurology    PROCEDURES  None    LABORATORY  Lab Results   Component Value Date    SODIUM 132 (L) 11/17/2024    POTASSIUM 5.2 11/17/2024    CHLORIDE 94 (L) 11/17/2024    CO2 30 11/17/2024    GLUCOSE 145 (H) 11/17/2024    BUN 9 11/17/2024    CREATININE 0.69 11/17/2024        Lab Results   Component Value Date    WBC 6.2 11/17/2024    HEMOGLOBIN 11.8 (L) 11/17/2024    HEMATOCRIT 37.0 11/17/2024    PLATELETCT 278 11/17/2024        Lina OSORIO A.P.R.N. performed a substantiated portion of the service face-to-face with same patient on the same date of service INDEPENDENTLY FROM THE MD ON ASSESSMENT, EXAMINATION, AND DISCUSSION IN PLAN OF CARE FOR 21 MINUTES.  I was personally involved in reviewing and conducting the medical decision making, including the information as described below:

## 2024-11-17 NOTE — DISCHARGE INSTRUCTIONS
Discharge Instructions    Discharged to home by car with relative. Discharged via wheelchair, hospital escort: Yes.  Special equipment needed: Not Applicable    Be sure to schedule a follow-up appointment with your primary care doctor or any specialists as instructed.     Discharge Plan:   Diet Plan: Discussed  Activity Level: Discussed  Confirmed Follow up Appointment: Patient to Call and Schedule Appointment  Confirmed Symptoms Management: Discussed  Medication Reconciliation Updated: Yes  Influenza Vaccine Indication: Patient Refuses    I understand that a diet low in cholesterol, fat, and sodium is recommended for good health. Unless I have been given specific instructions below for another diet, I accept this instruction as my diet prescription.   Other diet: Heart Healthy     Special Instructions: None    -Is this patient being discharged with medication to prevent blood clots?  No    Is patient discharged on Warfarin / Coumadin?   No           Discharge Instructions per AIDA Jurado.     -You are being prescribed acyclovir to be taken 5 times a day for a total of 10 days.  -You are being prescribed prednisone daily to be taken for a total of 7 days.  -Please wear an eye patch over the left eye to provide any corneal abrasions.     DIET: As Tolerated     ACTIVITY: As Tolerated     DIAGNOSIS: Bell's Palsy     Return to ER if you start experiencing numbness neurologist, shortness breath.

## 2024-11-17 NOTE — CARE PLAN
The patient is Stable - Low risk of patient condition declining or worsening    Shift Goals  Clinical Goals: MRI results, echo  Patient Goals: go home, feel better,  Family Goals: LURDES    Problem: Neuro Status  Goal: Neuro status will remain stable or improve  Description: Target End Date:  11/18/24    1.  Assess and monitor neurologic status per provider order/protocol/unit policy  2.  Assess level of consciousness and orientation  3.  Assess for speech, dysarthria, dysphagia, facial symmetry  4.  Assess visual field, eye movements, gaze preference, pupil reaction and size  5.  Assess muscle strength and motor response in all four extremities  6.  Assess for sensation (numbness and tingling)  7.  Assess basic neuro reflexes (cough, gag, corneal)  8.  Identify changes in neuro status and report to provider for testing/treatment orders  Outcome: Progressing     Problem: Hemodynamics  Goal: Patient's hemodynamics, fluid balance and neurologic status will be stable or improve  Description: Target End Date:  11/18/24    1.  Monitor vital signs, pulse oximetry and cardiac monitor per provider order and/or policy  2.  Maintain blood pressure per provider order  3.  Assess peripheral pulses and capillary refill  4. Assess mental status, restlessness and changes in level of consciousness  Outcome: Progressing     Problem: Knowledge Deficit - Standard  Goal: Patient will demonstrate understanding of plan of care, disease process/condition, diagnostic tests and medications  Description: Target End Date:  11/18/24    1.  Patient oriented to unit, equipment, visitation policy and means for communicating concern  2.  Complete/review Learning Assessment  3.  Assess knowledge level of disease process/condition, treatment plan, diagnostic tests and medications  4.  Explain disease process/condition, treatment plan, diagnostic tests and medications  Outcome: Progressing

## 2024-11-17 NOTE — ASSESSMENT & PLAN NOTE
Seen and diagnosed by neurology and ED  With left-sided facial droop  Recommend 400 mg p.o. acyclovir 5 times daily x 10 days and 60 mg p.o. prednisone daily x 7 days

## 2024-11-17 NOTE — CARE PLAN
The patient is Stable - Low risk of patient condition declining or worsening    Shift Goals  Clinical Goals: Imaging results,  Patient Goals: improved symptoms  Family Goals: NA    Progress made toward(s) clinical / shift goals:    Problem: Knowledge Deficit - Standard  Goal: Patient and family/care givers will demonstrate understanding of plan of care, disease process/condition, diagnostic tests and medications  Description: Target End Date:  1-3 days or as soon as patient condition allows    Document in Patient Education    1.  Patient and family/caregiver oriented to unit, equipment, visitation policy and means for communicating concern  2.  Complete/review Learning Assessment  3.  Assess knowledge level of disease process/condition, treatment plan, diagnostic tests and medications  4.  Explain disease process/condition, treatment plan, diagnostic tests and medications  Outcome: Progressing     Problem: Pain - Standard  Goal: Alleviation of pain or a reduction in pain to the patient’s comfort goal  Description: Target End Date:  Prior to discharge or change in level of care    Document on Vitals flowsheet    1.  Document pain using the appropriate pain scale per order or unit policy  2.  Educate and implement non-pharmacologic comfort measures (i.e. relaxation, distraction, massage, cold/heat therapy, etc.)  3.  Pain management medications as ordered  4.  Reassess pain after pain med administration per policy  5.  If opiods administered assess patient's response to pain medication is appropriate per POSS sedation scale  6.  Follow pain management plan developed in collaboration with patient and interdisciplinary team (including palliative care or pain specialists if applicable)  Outcome: Progressing       Patient is not progressing towards the following goals:

## 2024-11-17 NOTE — PROGRESS NOTES
Pt arrived to unit via gurney  at 2100. Pt oriented to room, unit, and plan of care. Pt denies pain at this time, reports left sided facial drooping since this morning. Tele-monitor placed and monitor room notified. All questions answered at this time. Call light within reach; fall precautions in place.

## 2024-11-17 NOTE — H&P
Hospital Medicine History & Physical Note    Date of Service  11/16/2024    Primary Care Physician  Berkley Mei P.A.-C.      Code Status  Full Code    Chief Complaint  Chief Complaint   Patient presents with    Possible Stroke     LKW 0900, L sided numbness/droop, L side headache, L sided arm weakness relieved currently, Rx Aspirin, high BP with /81, 3L O2 baseline, glucose 112, BBB on EMS EKG       History of Presenting Illness  Kristel Sewell is a 63 y.o. female who presented 11/16/2024 with possible stroke.    63-year-old female who presents to the emergency department with left sided facial numbness and drooping with headache and left-sided arm numbness and tingling.  She has a past medical history of oxygen dependent COPD, hypertension, anxiety, GERD, hyperlipidemia, depression, and benign essential tremor.    Patient states that she was in her usual state of health playing with her grandchild when she went to blow [employer] and was unable to control her tongue.  She states that she then went to take a drink of water and missed her mouth.  She subsequently noted a left facial droop with headache and left arm numbness and weakness.  This prompted her to be seen in the emergency department.  She denies any other acute symptomology including nausea, vomiting, chest pain, shortness of breath.  She is anxious.  Of note, she does have a known benign essential tremor of her head and bilateral upper extremities.    Patient was seen and evaluated by neurology for code stroke upon arrival.  She was diagnosed with Bell's palsy.  Recommendation is for p.o. acyclovir 400 mg 5 times daily x 10 days and p.o. prednisone 60 mg daily x 7 days.    In the emergency department CBC and CHEM panel relatively unremarkable.  Coagulation studies normal.  Troponin mildly elevated at 22.  CT head and CT CTA head neck all within normal limits with no acute abnormalities.  Chest x-ray demonstrates mild left basilar  atelectasis versus infiltrate.  EKG demonstrates sinus rhythm with right bundle branch block.    Patient will be admitted to the observation unit.  She will undergo MRI brain have also ordered an echocardiogram and right bundle branch block and repeat troponin.  Check TSH and urinalysis.  Will monitor on telemetry.    I discussed the plan of care with patient and ERP and collaborating physician .    Review of Systems  Review of Systems   Constitutional: Negative.  Negative for malaise/fatigue.   HENT: Negative.     Eyes: Negative.  Negative for blurred vision and double vision.   Respiratory: Negative.  Negative for shortness of breath.    Cardiovascular: Negative.  Negative for chest pain.   Gastrointestinal: Negative.  Negative for abdominal pain, nausea and vomiting.   Genitourinary: Negative.    Musculoskeletal: Negative.    Skin: Negative.    Neurological:  Positive for tingling and sensory change. Negative for focal weakness and loss of consciousness.   Endo/Heme/Allergies: Negative.    Psychiatric/Behavioral: Negative.     All other systems reviewed and are negative.      Past Medical History   has a past medical history of COPD (chronic obstructive pulmonary disease) ([facility]), Essential hypertension (5/19/2021), GILL (generalized anxiety disorder) (5/19/2021), GERD (gastroesophageal reflux disease), Mild episode of recurrent major depressive disorder (HCC) (5/19/2021), Mixed dyslipidemia (6/2/2021), and Tobacco dependence due to cigarettes.    Surgical History   has a past surgical history that includes primary c section.     Family History  family history includes Arrythmia in her father; Diabetes in her father and mother; Heart Disease in her mother; Kidney Disease in her father and mother; Stroke in her mother.   Family history reviewed with patient. There is no family history that is pertinent to the chief complaint.     Social History   reports that she has quit smoking. Her smoking use included cigarettes.  She has a 20 pack-year smoking history. She has been exposed to tobacco smoke. She has never used smokeless tobacco. She reports current alcohol use of about 12.6 - 14.4 oz of alcohol per week. She reports current drug use. Drugs: Marijuana and Inhaled.    Allergies  Allergies   Allergen Reactions    Penicillins Rash     Reaction occurred when patient was very young       Medications  Prior to Admission Medications   Prescriptions Last Dose Informant Patient Reported? Taking?   albuterol (PROAIR HFA) 108 (90 Base) MCG/ACT Aero Soln inhalation aerosol 11/15/2024 Evening Patient No Yes   Sig: Inhale 2 Puffs every 6 hours as needed for Shortness of Breath.   aspirin 81 MG EC tablet 11/15/2024 Evening Patient Yes Yes   Sig: Take 81 mg by mouth every evening.   atorvastatin (LIPITOR) 20 MG Tab 11/15/2024 Evening Patient No Yes   Sig: Take 1 Tablet by mouth every evening.   fluticasone-salmeterol (ADVAIR HFA) 230-21 MCG/ACT inhaler Not Taking Patient No No   Sig: Inhale 2 Puffs 2 times a day. Use with spacer.  Rinse mouth after each use.   Patient not taking: Reported on 11/16/2024   fluticasone-umeclidinium-vilanterol (TRELEGY ELLIPTA) 200-62.5-25 mcg/act inhaler Not Taking Patient No No   Sig: Inhale 1 Puff every day.   Patient not taking: Reported on 11/16/2024   lisinopril-hydrochlorothiazide (PRINZIDE) 10-12.5 MG per tablet 11/15/2024 Evening Patient No Yes   Sig: Take 1 tablet by mouth once daily   propranolol LA (INDERAL LA) 60 MG CAPSULE SR 24 HR 11/15/2024 Evening Patient No Yes   Sig: Take 1 Capsule by mouth every day.   tiotropium (SPIRIVA RESPIMAT) 2.5 mcg/Act Aero Soln 11/15/2024 Evening Patient No Yes   Sig: Inhale 2 Inhalations every day. Assemble and prime.   venlafaxine (EFFEXOR-XR) 150 MG extended-release capsule 11/16/2024 Morning Patient No Yes   Sig: Take 1 Capsule by mouth every day.      Facility-Administered Medications: None       Physical Exam  Temp:  [36.9 °C (98.4 °F)] 36.9 °C (98.4 °F)  Pulse:   [] 105  Resp:  [14-24] 14  BP: (117-224)/() 164/69  SpO2:  [96 %-100 %] 96 %  Blood Pressure: (!) 170/83   Temperature: 36.9 °C (98.4 °F)   Pulse: 98   Respiration: (!) 24   Pulse Oximetry: 97 %       Physical Exam  Vitals and nursing note reviewed.   Constitutional:       Appearance: Normal appearance.   HENT:      Head: Normocephalic.      Nose: Nose normal.      Mouth/Throat:      Mouth: Mucous membranes are moist.   Eyes:      Extraocular Movements: Extraocular movements intact.      Pupils: Pupils are equal, round, and reactive to light.   Cardiovascular:      Rate and Rhythm: Normal rate and regular rhythm.      Pulses: Normal pulses.      Heart sounds: Normal heart sounds.   Pulmonary:      Effort: Pulmonary effort is normal.      Breath sounds: Normal breath sounds.   Abdominal:      General: Abdomen is flat. Bowel sounds are normal.      Palpations: Abdomen is soft.   Musculoskeletal:         General: Normal range of motion.      Cervical back: Normal range of motion.   Skin:     General: Skin is warm.      Capillary Refill: Capillary refill takes 2 to 3 seconds.   Neurological:      Mental Status: She is alert and oriented to person, place, and time. Mental status is at baseline.      Comments: Left facial droop   Psychiatric:         Mood and Affect: Mood normal.         Behavior: Behavior normal.         Thought Content: Thought content normal.         Judgment: Judgment normal.         Laboratory:  Recent Labs     11/16/24  1342   WBC 7.4   RBC 3.98*   HEMOGLOBIN 11.7*   HEMATOCRIT 35.8*   MCV 89.9   MCH 29.4   MCHC 32.7   RDW 44.4   PLATELETCT 283   MPV 10.6     Recent Labs     11/16/24  1342   SODIUM 134*   POTASSIUM 4.6   CHLORIDE 94*   CO2 29   GLUCOSE 88   BUN 9   CREATININE 0.85   CALCIUM 8.9     Recent Labs     11/16/24  1342   ALTSGPT 25   ASTSGOT 28   ALKPHOSPHAT 74   TBILIRUBIN 0.3   GLUCOSE 88     Recent Labs     11/16/24  1342   APTT 26.0   INR 0.96     No results for input(s):  "\"NTPROBNP\" in the last 72 hours.      Recent Labs     11/16/24  1342 11/16/24  1900   TROPONINT 22* 32*       Imaging:  DX-CHEST-PORTABLE (1 VIEW)   Final Result      Mild left basilar atelectasis versus infiltrate.      CT-CTA NECK WITH & W/O-POST PROCESSING   Final Result         1. Atherosclerosis without significant stenosis, occlusion, or aneurysm.   2. Emphysema.      CT-CTA HEAD WITH & W/O-POST PROCESS   Final Result      Atherosclerosis without significant stenosis, occlusion, or aneurysm.      CT-CEREBRAL PERFUSION ANALYSIS   Final Result      1. Cerebral blood flow less than 30% possibly representing completed infarct = 0 mL.      2. T Max more than 6 seconds possibly representing combination of completed infarct and ischemia = 0 mL.      3. Mismatched volume possibly representing ischemic brain/penumbra= 0 mL      4.  Please note that this cerebral perfusion study and report is Quantitative and targets supratentorial (cerebral) perfusion for evaluation of large vessel territory acute ischemia/infarction. For example, lacunar infarcts, and brainstem/posterior fossa    ischemia/infarction are not evaluated on this study.  Data acquisition is subject to artifacts which can yield non-anatomically plausible perfusion maps which may be due to motion, bolus timing, signal to noise ratio, or other technical factors.    Perfusion map abnormalities which show non-anatomic distributions are likely artifact.   This study is not \"stand-alone\" and should only be utilized for diagnosis, management/treatment in correlation with CT, CTA, and/or MRI and clinical factors.         CT-HEAD W/O   Final Result      No CT evidence of acute infarct, hemorrhage or mass.               MR-BRAIN-W/O    (Results Pending)   EC-ECHOCARDIOGRAM COMPLETE W/O CONT    (Results Pending)       X-Ray:  I have personally reviewed the images and compared with prior images.  EKG:  I have personally reviewed the images and compared with prior " images.    Assessment/Plan:  Justification for Admission Status  I anticipate this patient is appropriate for observation status at this time because hypertension    Patient will need a Telemetry bed on MEDICAL service .  The need is secondary to hypertension.    * Hypertensive urgency- (present on admission)  Assessment & Plan  Continue home medication and monitor closely  Mildly elevated troponin at 22, recheck  As needed hydralazine for SBP greater than 180    Numbness and tingling in left arm- (present on admission)  Assessment & Plan  Since resolved  Strength normal and symmetrical  MRI brain and echocardiogram  Check TSH    Bell's palsy- (present on admission)  Assessment & Plan  Seen and diagnosed by neurology and ED  With left-sided facial droop  Recommend 400 mg p.o. acyclovir 5 times daily x 10 days and 60 mg p.o. prednisone daily x 7 days    Benign essential tremor- (present on admission)  Assessment & Plan  Has baseline head and upper extremity tremor    Mixed dyslipidemia- (present on admission)  Assessment & Plan  Continue home medication    COPD (chronic obstructive pulmonary disease) with chronic bronchitis (HCC)- (present on admission)  Assessment & Plan  Oxygen dependent COPD on 3 L  Not in exacerbation  Continue home medication  Monitor    Generalized anxiety disorder- (present on admission)  Assessment & Plan  Mildly anxious on exam  As needed Atarax        VTE prophylaxis: SCDs/TEDs and Xarelto 10 mg daily as prophylaxis

## 2024-11-17 NOTE — ED NOTES
Pt transferred out of ED at this time with transport via MRI gurney to [facility]. Pt is A&Ox4, with stable vital signs and no apparent distress upon transfer. Pt 4L O2 with adequate O2 volume in bed tank. All paperwork and personal belongings sent with pt to MRI. To be transported to T212 after MRI.

## 2024-11-17 NOTE — ED NOTES
Bedside report with JESSIKA Bardales. Pt connected to monitor, on baseline of 3.5L NC. Call light within reach. Pt aware of POC.

## 2024-11-17 NOTE — ED NOTES
Pt medicated with home medications, tolerated well.  Pt appears to have increased work of breathing. Albuterol inhaler provided to pt.  JORGE Guevara updated.

## 2024-11-17 NOTE — ASSESSMENT & PLAN NOTE
Continue home medication and monitor closely  Mildly elevated troponin at 22, recheck  As needed hydralazine for SBP greater than 180

## 2024-11-17 NOTE — PROGRESS NOTES
Pt dc'd home. IV and monitor removed; monitor room notified. Pt left unit via wheelchair with tech, transported home with granddaughter Bridgett. Personal belongings, stored home meds with pt when leaving unit. Pt given discharge instructions prior to leaving unit meds to bed delivered, eye patch provided and when to follow-up; verbalizes understanding. Copy of discharge instructions with pt and in the chart.

## 2024-11-17 NOTE — PROGRESS NOTES
Report received from Miguel RN. Assume care. Pt is AAOx4.  Assessment completed. VSS. Pt c/o HA, medicated per MAR; Pt was update on POC. White board updated, All question answered. Pt has call light within reach,  bed is in the lowest position. Pt has no other needs at this time.

## 2024-11-17 NOTE — ED NOTES
Pt aaox4. Per family pt now able to move tongue from side to side, speech not as slurred as it was and symptoms improved. Now able to blink on left eye but still unable to shut it completely. Still has mild left sided facial droop.

## 2024-11-17 NOTE — ED NOTES
Rounded on pt. Pt asleep in gurney with even rise and fall of chest visible. No signs of distress at this time. Call light within reach.

## 2024-11-17 NOTE — HOSPITAL COURSE
Kristel Sewell is a 63 y.o. female with a past medical history of oxygen dependent COPD, hypertension, anxiety, GERD, hyperlipidemia, depression, and benign essential tremor who presented 11/16/2024 with left sided facial numbness and drooping with headache and left-sided arm numbness and tingling.     Patient states that she was in her usual state of health playing with her grandchild when she went to blow raspberries and was unable to control her tongue.  She states that she then went to take a drink of water and missed her mouth.  She subsequently noted a left facial droop with headache and left arm numbness and weakness.  This prompted her to be seen in the emergency department.  She denies any other acute symptomology including nausea, vomiting, chest pain, shortness of breath.  She is anxious.  Of note, she does have a known benign essential tremor of her head and bilateral upper extremities.     Patient was seen and evaluated by neurology for code stroke upon arrival.  She was diagnosed with Bell's palsy.  Recommendation is for p.o. acyclovir 400 mg 5 times daily x 10 days and p.o. prednisone 60 mg daily x 7 days.     In the emergency department CBC and CHEM panel relatively unremarkable.  Coagulation studies normal.  Troponin mildly elevated at 22.  CT head and CT CTA head neck all within normal limits with no acute abnormalities.  Chest x-ray demonstrates mild left basilar atelectasis versus infiltrate.  EKG demonstrates sinus rhythm with right bundle branch block.     Patient examined this a.m.  She is still having left facial droop but numbness and tingling have since resolved.  I discussed with her neurology's diagnosis of Bell's palsy and treatment recommendations.  Patient is in understanding and agreement with the plan.  I further discussed with her that we would be sending her home with an eye patch and eye drops to prevent any corneal abrasions or injury.    Patient expressed understanding.   All questions answered at this time.  Patient okay to discharge.

## 2024-11-17 NOTE — PROGRESS NOTES
4 Eyes Skin Assessment Completed by JESSIKA Tipton and JESSIKA Mejia.    Head WDL  Ears WDL  Nose WDL  Mouth WDL  Neck WDL  Breast/Chest moisture and small area of redness under left breast  Shoulder Blades Redness Scratches/scabs  Spine Redness Scratches/scabs  (R) Arm/Elbow/Hand WDL  (L) Arm/Elbow/Hand WDL  Abdomen Scab Scratches  Groin WDL  Scrotum/Coccyx/Buttocks WDL  (R) Leg Scattered scratches  (L) Leg Bruising Scattered scratches  (R) Heel/Foot/Toe WDL  (L) Heel/Foot/Toe WDL      Devices In Places Tele Box, Blood Pressure Cuff, and Pulse Ox      Interventions In Place Gray Ear Foams and Pillows    Possible Skin Injury No    Pictures Uploaded Into Epic N/A  Wound Consult Placed N/A  RN Wound Prevention Protocol Ordered No

## 2024-11-17 NOTE — RESPIRATORY CARE
COPD EDUCATION by COPD CLINICAL EDUCATOR  11/17/2024  at  10:06 AM by Harpreet Awan, RRT     Patient interviewed by education team.  Patient declined or is unable to participate in the full program.  Therefore, a short intervention has been conducted.  A comprehensive packet including information about COPD, types of treatments to manage their disease and safe home Oxygen usage was provided and reviewed with patient at the bedside.       Quit smoking several years ago, daughter manages her care. [employer] is DME she wears 3L. Followed by [facility]. We discussed current medications, spacer with demonstration was given.    COPD Screen  COPD Risk Screening  Do you have a history of COPD?: Yes  Do you have a Pulmonologist?: Yes    COPD Assessment  COPD Clinical Specialists ONLY  COPD Education Initiated: Yes--Short Intervention (patient wears 3L o2 her dme is [employer]. quit smoking several years ago followed by [facility])  Is this a COPD exacerbation patient?: No  DME Company: [employer]  DME Equipment Type: oxygen concentrator nebulizer  Physician Name: Berkley Mei, declined saw 2 months ago said her daughter makes her appointments  Pulmonologist Name: Nati Benson  Referrals Initiated: Yes  Pulmonary Rehab: No  Smoking Cessation: No  Hospice: No  Home Health Care: No  Mobile Urgent Care Services: No  [facility] Group: No  Private In-Home Care Agency: No  $ Demo/Eval of SVN's, MDI's and Aerosols: Yes (spacer and flutter given with demonstration)  (OP) Pulmonary Function Testing: Yes (01/03/2024 FEV1=29 FEV1/FVC=45)  Interdisciplinary Rounds: Attendance at Rounds (30 Min)    PFT Results    01/03/2024 FEV1=29 FEV1/FVC=45    Meds to Beds  [facility] provides bedside medication delivery for all eligible patients at discharge and you have been automatically enrolled in the Meds to Beds Program. Would you like to opt out of this program for any reason?: No - Stay Opted In     MY COPD ACTION PLAN     It is  "recommended that patients and physicians /healthcare providers complete this action plan together. This plan should be discussed at each physician visit and updated as needed.    The green, yellow and red zones show groups of symptoms of COPD. This list of symptoms is not comprehensive, and you may experience other symptoms. In the \"Actions\" column, your healthcare provider has recommended actions for you to take based on your symptoms.    Patient Name: Kristel Sewell   YOB: 1960   Last Updated on: 11/17/2024 10:05 AM   Green Zone:  I am doing well today Actions     Usual activitiy and exercise level   Take daily medications     Usual amounts of cough and phlegm/mucus   Use oxygen as prescribed     Sleep well at night   Continue regular exercise/diet plan     Appetite is good   At all times avoid cigarette smoke, inhaled irritants     Daily Medications (these medications are taken every day):   Tiotropium Bromide Monohydrate (Spiriva)  Fluticosone/Salmeterol (Advair)    Once daily  Twice daily     Additional Information:  Take medications as directed, rinse mouth after using advair    Yellow Zone:  I am having a bad day or a COPD flare Actions     More breathless than usual   Continue daily medications     I have less energy for my daily activities   Use quick relief inhaler as ordered     Increased or thicker phlegm/mucus   Use oxygen as prescribed     Using quick relief inhaler/nebulizer more often   Get plenty of rest     Swelling of ankles more than usual   Use pursed lip breathing     More coughing than usual   At all times avoid cigarette smoke, inhaled irritants     I feel like I have a \"chest cold\"     Poor sleep and my symptoms woke me up     My appetite is not good     My medicine is not helping      Call provider immediately if symptoms don’t improve     Continue daily medications, add rescue medications:   Albuterol  Albuterol 2 Puffs  2.5mg via nebulizer Every 6 hours PRN  Every 6 " hours PRN       Medications to be used during a flare up, (as Discussed with Provider):           Additional Information:  Use spacer with inhaler    Red Zone:  I need urgent medical care Actions     Severe shortness of breath even at rest   Call 911 or seek medical care immediately     Not able to do any activity because of breathing      Fever or shaking chills      Feeling confused or very drowsy       Chest pains      Coughing up blood

## 2024-11-17 NOTE — PROGRESS NOTES
Report received from JESSIKA Bardales in ED. Pt en route to MRI. MRI tech will transport to CDU after imaging is complete.

## 2024-11-17 NOTE — PROGRESS NOTES
Monitor summary:  Sinus rhythm, sinus tach  Rate   No ectopy  0.14/0.10/0.38      Per monitor tech interpretation.

## 2024-11-20 RX ORDER — TIOTROPIUM BROMIDE INHALATION SPRAY 3.12 UG/1
5 SPRAY, METERED RESPIRATORY (INHALATION) DAILY
Qty: 3 EACH | Refills: 3 | Status: SHIPPED | OUTPATIENT
Start: 2024-11-20

## 2024-11-20 RX ORDER — FLUTICASONE PROPIONATE AND SALMETEROL XINAFOATE 230; 21 UG/1; UG/1
2 AEROSOL, METERED RESPIRATORY (INHALATION) 2 TIMES DAILY
Qty: 3 EACH | Refills: 3 | Status: SHIPPED | OUTPATIENT
Start: 2024-11-20

## 2024-11-27 ENCOUNTER — OFFICE VISIT (OUTPATIENT)
Dept: MEDICAL GROUP | Facility: CLINIC | Age: 64
End: 2024-11-27
Payer: MEDICAID

## 2024-11-27 VITALS
SYSTOLIC BLOOD PRESSURE: 118 MMHG | BODY MASS INDEX: 30.62 KG/M2 | DIASTOLIC BLOOD PRESSURE: 60 MMHG | OXYGEN SATURATION: 88 % | RESPIRATION RATE: 20 BRPM | WEIGHT: 172.84 LBS | HEIGHT: 63 IN | HEART RATE: 75 BPM | TEMPERATURE: 97.8 F

## 2024-11-27 DIAGNOSIS — G51.0 BELL'S PALSY: ICD-10-CM

## 2024-11-27 DIAGNOSIS — F33.1 MODERATE EPISODE OF RECURRENT MAJOR DEPRESSIVE DISORDER (HCC): ICD-10-CM

## 2024-11-27 DIAGNOSIS — Z09 HOSPITAL DISCHARGE FOLLOW-UP: Primary | ICD-10-CM

## 2024-11-27 DIAGNOSIS — J44.89 COPD (CHRONIC OBSTRUCTIVE PULMONARY DISEASE) WITH CHRONIC BRONCHITIS (HCC): ICD-10-CM

## 2024-11-27 DIAGNOSIS — F17.200 CURRENT SMOKER: ICD-10-CM

## 2024-11-27 DIAGNOSIS — Z99.81 SUPPLEMENTAL OXYGEN DEPENDENT: ICD-10-CM

## 2024-11-27 DIAGNOSIS — F41.9 ANXIETY: ICD-10-CM

## 2024-11-27 RX ORDER — NICOTINE 21 MG/24HR
1 PATCH, TRANSDERMAL 24 HOURS TRANSDERMAL EVERY 24 HOURS
Qty: 14 PATCH | Refills: 0 | Status: SHIPPED | OUTPATIENT
Start: 2024-11-27 | End: 2024-12-11

## 2024-11-27 RX ORDER — VENLAFAXINE HYDROCHLORIDE 75 MG/1
225 CAPSULE, EXTENDED RELEASE ORAL DAILY
Qty: 270 CAPSULE | Refills: 1 | Status: SHIPPED | OUTPATIENT
Start: 2024-11-27

## 2024-11-27 ASSESSMENT — FIBROSIS 4 INDEX: FIB4 SCORE: 1.25

## 2024-11-27 NOTE — PROGRESS NOTES
"Subjective:     Kristel Sewell is a 63 y.o. female who presents for Hospital Follow-up.    Transitional Care Management          HPI:   Recently hospitalized for left-sided numbness and droop left-sided headache left-sided arm weakness.  Patient presented to the ER on 11- was admitted and therefore discharged on 11-.  Patient was seen for oxygen dependent COPD hypertension, anxiety, acid reflux, hyperlipidemia, depression and a benign essential tremor.  Notes indicate that she began to have symptoms when she was playing with her grandchild blowing raspberries and unable to control her tongue.  She tried to drink water and \"missed her mouth\".  Further workup was done and patient was diagnosed with Bell's palsy.  Instructed to take acyclovir 400 mg 5 times a day for 10 days and prednisone 60 mg daily for 7 days.  Day of discharge patient had indicated that numbness and tingling had resolved but facial droop was still present.  She was also discharged with eyedrops and an eye patch.  Patient does acknowledge that her symptoms for her Bell's palsy are improving.  She is not wanting to see neurology at this time.    She has an appointment with pulmonary medicine 1-21-25.  Patient is continuously on oxygen for 3 to 3.5 lpm.  She needs an order to be submitted to [facility] in [place].      Patient has had increased depression and anxiety.  She is here with her  and daughter and is requesting a medication adjustment.      Patient is also requesting help with smoking cessation.  Her family acknowledges that she vapes almost continuously throughout the day.  She also smokes and does use marijuana.  She is not ready for marijuana cessation.  She is requesting the patch to help her with smoking cessation.    Current medicines (including reconciliation performed today)  Current Outpatient Medications   Medication Sig Dispense Refill    nicotine (NICODERM) 21 MG/24HR PATCH 24 HR Place 1 Patch on the skin " every 24 hours for 14 days. 14 Patch 0    venlafaxine XR (EFFEXOR XR) 75 MG CAPSULE SR 24 HR Take 3 Capsules by mouth every day. 270 Capsule 1    fluticasone-salmeterol (ADVAIR HFA) 230-21 MCG/ACT inhaler Inhale 2 Puffs 2 times a day. Use with spacer.  Rinse mouth after each use. 3 Each 3    tiotropium (SPIRIVA RESPIMAT) 2.5 mcg/Act Aero Soln Inhale 2 Inhalations every day. Assemble and prime. 3 Each 3    carboxymethylcellulose (REFRESH TEARS) 0.5 % Solution Administer 1 Drop into the left eye as needed (Itching) for up to 14 days. 30 mL 0    aspirin 81 MG EC tablet Take 81 mg by mouth every evening.      albuterol (PROAIR HFA) 108 (90 Base) MCG/ACT Aero Soln inhalation aerosol Inhale 2 Puffs every 6 hours as needed for Shortness of Breath. 18 g 6    propranolol LA (INDERAL LA) 60 MG CAPSULE SR 24 HR Take 1 Capsule by mouth every day. 90 Capsule 0    atorvastatin (LIPITOR) 20 MG Tab Take 1 Tablet by mouth every evening. 90 Tablet 1    lisinopril-hydrochlorothiazide (PRINZIDE) 10-12.5 MG per tablet Take 1 tablet by mouth once daily 90 Tablet 3     No current facility-administered medications for this visit.       Allergies:   Penicillins    Social History     Tobacco Use    Smoking status: Former     Current packs/day: 0.50     Average packs/day: 0.5 packs/day for 40.0 years (20.0 ttl pk-yrs)     Types: Cigarettes     Passive exposure: Past    Smokeless tobacco: Never   Vaping Use    Vaping status: Every Day    Substances: THC   Substance Use Topics    Alcohol use: Yes     Alcohol/week: 12.6 - 14.4 oz     Types: 21 - 24 Cans of beer per week     Comment: 2/day    Drug use: Yes     Types: Marijuana, Inhaled       ROS:  Denies any other symptoms unless previously indicated.      Objective:     Vitals:    11/27/24 1336 11/27/24 1456   BP: 118/60    BP Location: Left arm    Patient Position: Sitting    BP Cuff Size: Adult    Pulse: 75    Resp: 20    Temp: 36.6 °C (97.8 °F)    TempSrc: Temporal    SpO2: 98% 88%   Weight:  "78.4 kg (172 lb 13.5 oz)    Height: 1.6 m (5' 3\")      Body mass index is 30.62 kg/m².    Physical Exam:  Physical Exam  Constitutional:       Appearance: Normal appearance. She is obese.   HENT:      Head: Normocephalic and atraumatic.      Comments: Left sided facial droop with some movement in face.       Nose: Nose normal.   Musculoskeletal:         General: Normal range of motion.      Cervical back: Neck supple.   Skin:     General: Skin is warm and dry.   Neurological:      Mental Status: She is alert.      Comments: Left sided bells palsy   Psychiatric:      Comments: depression          Assessment and Plan:   1. Hospital discharge follow-up  2. Bell's palsy    Will continue to monitor.  Follow-up in 6 weeks.  Hold off on neurology referral at this time.    3. COPD (chronic obstructive pulmonary disease) with chronic bronchitis (HCC)  4. Current smoker  - nicotine (NICODERM) 21 MG/24HR PATCH 24 HR; Place 1 Patch on the skin every 24 hours for 14 days.  Dispense: 14 Patch; Refill: 0    Will submit orders to [facility] as patient is currently using 3 to 3-1/2 L of oxygen.  Patient will continue to see pulmonary medicine.  We will have patient start the NicoDerm patch 21 mg.  Due to patient's vaping history, we have discussed that this may take some effort on her part.  She is to let me know through Crossfader messages how she is doing.    5. Moderate episode of recurrent major depressive disorder (HCC)  - venlafaxine XR (EFFEXOR XR) 75 MG CAPSULE SR 24 HR; Take 3 Capsules by mouth every day.  Dispense: 270 Capsule; Refill: 1  6. Anxiety  - venlafaxine XR (EFFEXOR XR) 75 MG CAPSULE SR 24 HR; Take 3 Capsules by mouth every day.  Dispense: 270 Capsule; Refill: 1    Will increase the venlafaxine from 150 mg to 225 mg daily follow-up in 6 weeks.    - Chart and discharge summary were reviewed.   - Hospitalization and results reviewed with patient.   - Medications reviewed including instructions regarding high risk " medications, dosing and side effects.  - Recommended Services: No services needed at this time  - Advance directive/POLST on file?  No     Follow-up:Return in about 6 weeks (around 1/8/2025), or if symptoms worsen or fail to improve, for follow up in 6 weeks medication..    Face-to-face transitional care management services with [facility]ERATE (today's visit is within 14 days post discharge & LACE+ score of 28-58) medical decision complexity were provided.

## 2024-12-27 DIAGNOSIS — I10 ESSENTIAL HYPERTENSION: ICD-10-CM

## 2024-12-27 DIAGNOSIS — G25.0 BENIGN ESSENTIAL TREMOR: ICD-10-CM

## 2024-12-30 RX ORDER — LISINOPRIL AND HYDROCHLOROTHIAZIDE 10; 12.5 MG/1; MG/1
1 TABLET ORAL DAILY
Qty: 90 TABLET | Refills: 2 | Status: SHIPPED | OUTPATIENT
Start: 2024-12-30

## 2024-12-30 RX ORDER — PROPRANOLOL HYDROCHLORIDE 60 MG/1
60 CAPSULE, EXTENDED RELEASE ORAL DAILY
Qty: 90 CAPSULE | Refills: 2 | Status: SHIPPED | OUTPATIENT
Start: 2024-12-30

## 2024-12-30 NOTE — TELEPHONE ENCOUNTER
Received request via: Patient    Was the patient seen in the last year in this department? Yes    Does the patient have an active prescription (recently filled or refills available) for medication(s) requested?  yes    Pharmacy Name: [facility] in [place]    Does the patient have prison Plus and need 100-day supply? (This applies to ALL medications) Patient does not have SCP

## 2025-01-21 ENCOUNTER — NON-PROVIDER VISIT (OUTPATIENT)
Dept: SLEEP MEDICINE | Facility: MEDICAL CENTER | Age: 65
End: 2025-01-21
Attending: INTERNAL MEDICINE
Payer: MEDICAID

## 2025-01-21 VITALS — BODY MASS INDEX: 29.06 KG/M2 | HEIGHT: 63 IN | WEIGHT: 164 LBS

## 2025-01-21 VITALS
DIASTOLIC BLOOD PRESSURE: 72 MMHG | WEIGHT: 164 LBS | OXYGEN SATURATION: 99 % | HEIGHT: 63 IN | SYSTOLIC BLOOD PRESSURE: 140 MMHG | BODY MASS INDEX: 29.06 KG/M2 | HEART RATE: 88 BPM

## 2025-01-21 DIAGNOSIS — Z72.0 TOBACCO USE: ICD-10-CM

## 2025-01-21 DIAGNOSIS — J44.9 CHRONIC OBSTRUCTIVE PULMONARY DISEASE, UNSPECIFIED COPD TYPE (HCC): ICD-10-CM

## 2025-01-21 DIAGNOSIS — R91.8 LUNG NODULES: ICD-10-CM

## 2025-01-21 DIAGNOSIS — J96.11 CHRONIC RESPIRATORY FAILURE WITH HYPOXIA (HCC): ICD-10-CM

## 2025-01-21 PROCEDURE — 94060 EVALUATION OF WHEEZING: CPT | Mod: 26 | Performed by: INTERNAL MEDICINE

## 2025-01-21 PROCEDURE — 94726 PLETHYSMOGRAPHY LUNG VOLUMES: CPT | Mod: 26 | Performed by: INTERNAL MEDICINE

## 2025-01-21 PROCEDURE — 94729 DIFFUSING CAPACITY: CPT | Mod: 26 | Performed by: INTERNAL MEDICINE

## 2025-01-21 PROCEDURE — 94060 EVALUATION OF WHEEZING: CPT | Performed by: INTERNAL MEDICINE

## 2025-01-21 PROCEDURE — 99213 OFFICE O/P EST LOW 20 MIN: CPT | Mod: 25 | Performed by: INTERNAL MEDICINE

## 2025-01-21 PROCEDURE — 94726 PLETHYSMOGRAPHY LUNG VOLUMES: CPT | Performed by: INTERNAL MEDICINE

## 2025-01-21 PROCEDURE — 94729 DIFFUSING CAPACITY: CPT | Performed by: INTERNAL MEDICINE

## 2025-01-21 PROCEDURE — 99214 OFFICE O/P EST MOD 30 MIN: CPT | Mod: 25 | Performed by: INTERNAL MEDICINE

## 2025-01-21 RX ORDER — VARENICLINE TARTRATE 1 MG/1
TABLET, FILM COATED ORAL
Qty: 60 TABLET | Refills: 3 | Status: SHIPPED | OUTPATIENT
Start: 2025-01-21

## 2025-01-21 RX ORDER — VARENICLINE TARTRATE 0.5 (11)-1
KIT ORAL
Qty: 53 EACH | Refills: 0 | Status: SHIPPED | OUTPATIENT
Start: 2025-01-21

## 2025-01-21 ASSESSMENT — FIBROSIS 4 INDEX
FIB4 SCORE: 1.27
FIB4 SCORE: 1.27

## 2025-01-21 ASSESSMENT — ENCOUNTER SYMPTOMS
HEMOPTYSIS: 0
COUGH: 0
ORTHOPNEA: 0
BLURRED VISION: 0
PALPITATIONS: 0
SHORTNESS OF BREATH: 1
EYE PAIN: 0
SORE THROAT: 0
SPUTUM PRODUCTION: 0
DEPRESSION: 0
FOCAL WEAKNESS: 0
CONSTIPATION: 0
HEARTBURN: 0
PHOTOPHOBIA: 0
ABDOMINAL PAIN: 0
NAUSEA: 0
NECK PAIN: 0
WEAKNESS: 0
DIAPHORESIS: 0
PND: 0
CLAUDICATION: 0
CHILLS: 0
DIARRHEA: 0
HEADACHES: 0
TREMORS: 0
STRIDOR: 0
EYE DISCHARGE: 0
MYALGIAS: 0
EYE REDNESS: 0
DOUBLE VISION: 0
FEVER: 0
FALLS: 0
DIZZINESS: 0
BACK PAIN: 0
SPEECH CHANGE: 0
WEIGHT LOSS: 0
SINUS PAIN: 0
WHEEZING: 0
VOMITING: 0

## 2025-01-21 NOTE — PROGRESS NOTES
Chief Complaint   Patient presents with    Follow-Up     LAST SEEN 7/3/24    Results     CXR 11/16/24  PFT 1/21/25         HPI: This patient is a 64 y.o. female whom is followed in our clinic for COPD c/b chronic hypoxemic respiratory failure last seen by me on 7/3/24.  PMHx is significant for HLP, HTN and COPD.  She has 30+ pk year tobacco hx and had been tobacco free for about a year but resumed tobacco use in the past few months. She was successful with Chantix and would like to try this again. She is on advair 230 HFA and spiriva and prn PAULETTE via [facility]. She uses supplemental O2 at 3 LPM pulsed and up to 5lPM with activity. She has not had exacerbation in the last  18 mos. Functionally she is MMRC 2-3. We referred her to virtual pulmonary rehab but the virtual program is no longer running.  She has had pulmonary nodules in the past  and CT chest from January 2024 showed new GGO in RUL so we ordered short interval f/u which showed decrease in size/density 5/2024. No concerning nodules. PFT from January 2024 showed postbronchodilator FEV1 at 0.96 L or 41% predicted. +BD responsiveness. She does have hyperinflation, air trapping and reduced DLCO. Updated PFT today is stable to slight progression. She was recently admitted to the hospital for concerns of acute CVA with L facial droop but ultimately dx with Bell's palsy which has resolved.     Past Medical History:   Diagnosis Date    COPD (chronic obstructive pulmonary disease) (HCC)     Essential hypertension 5/19/2021    GILL (generalized anxiety disorder) 5/19/2021    GERD (gastroesophageal reflux disease)     Mild episode of recurrent major depressive disorder (HCC) 5/19/2021    Mixed dyslipidemia 6/2/2021    Tobacco dependence due to cigarettes        Social History     Socioeconomic History    Marital status:      Spouse name: Jm    Number of children: 2    Years of education: Not on file    Highest education level: 12th grade   Occupational History     Not on file   Tobacco Use    Smoking status: Former     Current packs/day: 0.50     Average packs/day: 0.5 packs/day for 40.0 years (20.0 ttl pk-yrs)     Types: Cigarettes     Passive exposure: Past    Smokeless tobacco: Never   Vaping Use    Vaping status: Every Day    Substances: THC   Substance and Sexual Activity    Alcohol use: Yes     Alcohol/week: 12.6 - 14.4 oz     Types: 21 - 24 Cans of beer per week     Comment: 2/day    Drug use: Yes     Types: Marijuana, Inhaled    Sexual activity: Yes     Partners: Male     Birth control/protection: Post-Menopausal   Other Topics Concern    Not on file   Social History Narrative    Not on file     Social Drivers of Health     Financial Resource Strain: Low Risk  (11/17/2024)    Overall Financial Resource Strain (CARDIA)     Difficulty of Paying Living Expenses: Not hard at all   Food Insecurity: Food Insecurity Present (11/17/2024)    Hunger Vital Sign     Worried About Running Out of Food in the Last Year: Sometimes true     Ran Out of Food in the Last Year: Sometimes true   Transportation Needs: Unmet Transportation Needs (11/17/2024)    PRAPARE - Transportation     Lack of Transportation (Medical): Yes     Lack of Transportation (Non-Medical): No   Physical Activity: Insufficiently Active (11/17/2024)    Exercise Vital Sign     Days of Exercise per Week: 2 days     Minutes of Exercise per Session: 10 min   Stress: Stress Concern Present (11/17/2024)    [employer] - Occupational Stress Questionnaire     Feeling of Stress : Rather much   Social Connections: Socially Isolated (11/17/2024)    Social Connection and Isolation Panel [NHANES]     Frequency of Communication with Friends and Family: Once a week     Frequency of Social Gatherings with Friends and Family: Never     Attends Sabianist Services: Never     Active Member of Clubs or Organizations: No     Attends Club or Organization Meetings: Never     Marital Status:    Intimate  Partner Violence: Not At Risk (11/16/2024)    Humiliation, Afraid, Rape, and Kick questionnaire     Fear of Current or Ex-Partner: No     Emotionally Abused: No     Physically Abused: No     Sexually Abused: No   Housing Stability: Low Risk  (11/17/2024)    Housing Stability Vital Sign     Unable to Pay for Housing in the Last Year: No     Number of Times Moved in the Last Year: 1     Homeless in the Last Year: No       Family History   Problem Relation Age of Onset    Kidney Disease Mother     Heart Disease Mother     Stroke Mother     Diabetes Mother     Diabetes Father     Kidney Disease Father     Arrythmia Father        Current Outpatient Medications on File Prior to Visit   Medication Sig Dispense Refill    lisinopril-hydrochlorothiazide (PRINZIDE) 10-12.5 MG per tablet Take 1 tablet by mouth once daily 90 Tablet 2    propranolol LA (INDERAL LA) 60 MG CAPSULE SR 24 HR Take 1 capsule by mouth once daily 90 Capsule 2    venlafaxine XR (EFFEXOR XR) 75 MG CAPSULE SR 24 HR Take 3 Capsules by mouth every day. 270 Capsule 1    fluticasone-salmeterol (ADVAIR HFA) 230-21 MCG/ACT inhaler Inhale 2 Puffs 2 times a day. Use with spacer.  Rinse mouth after each use. 3 Each 3    tiotropium (SPIRIVA RESPIMAT) 2.5 mcg/Act Aero Soln Inhale 2 Inhalations every day. Assemble and prime. 3 Each 3    aspirin 81 MG EC tablet Take 81 mg by mouth every evening.      albuterol (PROAIR HFA) 108 (90 Base) MCG/ACT Aero Soln inhalation aerosol Inhale 2 Puffs every 6 hours as needed for Shortness of Breath. 18 g 6    atorvastatin (LIPITOR) 20 MG Tab Take 1 Tablet by mouth every evening. 90 Tablet 1    NON SPECIFIED Oxygen 3 to 3.5  lpm with O2 nasal canula. 1 Each 12     No current facility-administered medications on file prior to visit.       Penicillins      ROS:   Review of Systems   Constitutional:  Negative for chills, diaphoresis, fever, malaise/fatigue and weight loss.   HENT:  Negative for congestion, ear discharge, ear pain,  "hearing loss, nosebleeds, sinus pain, sore throat and tinnitus.    Eyes:  Negative for blurred vision, double vision, photophobia, pain, discharge and redness.   Respiratory:  Positive for shortness of breath. Negative for cough, hemoptysis, sputum production, wheezing and stridor.    Cardiovascular:  Negative for chest pain, palpitations, orthopnea, claudication, leg swelling and PND.   Gastrointestinal:  Negative for abdominal pain, constipation, diarrhea, heartburn, nausea and vomiting.   Genitourinary:  Negative for dysuria and urgency.   Musculoskeletal:  Negative for back pain, falls, joint pain, myalgias and neck pain.   Skin:  Negative for itching and rash.   Neurological:  Negative for dizziness, tremors, speech change, focal weakness, weakness and headaches.   Endo/Heme/Allergies:  Negative for environmental allergies.   Psychiatric/Behavioral:  Negative for depression.        BP (!) 140/72 (BP Location: Right arm, Patient Position: Sitting, BP Cuff Size: Adult)   Pulse 88   Ht 1.588 m (5' 2.5\")   Wt 74.4 kg (164 lb)   SpO2 99%   Physical Exam  Constitutional:       General: She is not in acute distress.     Appearance: Normal appearance. She is well-developed and normal weight.   HENT:      Head: Normocephalic and atraumatic.      Right Ear: External ear normal.      Left Ear: External ear normal.      Nose: Nose normal. No congestion.      Mouth/Throat:      Mouth: Mucous membranes are moist.      Pharynx: Oropharynx is clear. No oropharyngeal exudate.   Eyes:      General: No scleral icterus.     Extraocular Movements: Extraocular movements intact.      Conjunctiva/sclera: Conjunctivae normal.      Pupils: Pupils are equal, round, and reactive to light.   Neck:      Vascular: No JVD.      Trachea: No tracheal deviation.   Cardiovascular:      Rate and Rhythm: Normal rate and regular rhythm.      Heart sounds: Normal heart sounds. No murmur heard.     No friction rub. No gallop.   Pulmonary:      " Effort: Pulmonary effort is normal. No accessory muscle usage or respiratory distress.      Breath sounds: No wheezing or rales.      Comments: Decreased air movement b/l throughout  Abdominal:      General: There is no distension.      Palpations: Abdomen is soft.      Tenderness: There is no abdominal tenderness.   Musculoskeletal:         General: No tenderness or deformity. Normal range of motion.      Cervical back: Normal range of motion and neck supple.      Right lower leg: No edema.      Left lower leg: No edema.   Lymphadenopathy:      Cervical: No cervical adenopathy.   Skin:     General: Skin is warm and dry.      Findings: No rash.      Nails: There is no clubbing.   Neurological:      Mental Status: She is alert and oriented to person, place, and time.      Cranial Nerves: No cranial nerve deficit.      Gait: Gait normal.   Psychiatric:         Behavior: Behavior normal.         PFTs as reviewed by me personally:as per hpI    Imaging as reviewed by me personally:  as per HPI    Assessment:  1. Tobacco use  Varenicline Tartrate, Starter, (CHANTIX STARTING MONTH PAK) 0.5 MG X 11 & 1 MG X 42 Tablet Therapy Pack    varenicline (CHANTIX CONTINUING MONTH PAK) 1 MG tablet      2. Chronic obstructive pulmonary disease, unspecified COPD type (HCC)        3. Chronic respiratory failure with hypoxia (HCC)        4. Lung nodules  DME O2 New Set Up    CT-CHEST (THORAX) W/O          Plan:  She has been successful with varenicline in the past therefore will resume. I prescribed starting and continuation packs.   Chronic, severe. Stable symptomatically with grossly stable to mildly progressive PFT. Counseled on tobacco cessation. Now medicaid therefore pulmonary rehab would not be covered. Continue ICS/LABA with advair and LAMA with spiriva. Trelegy not covered. Continue supplemental O2 and prn Aminata  Chronic and 2/2 COPD. Pt O2 needs could not be assessed today in clinic due to inability to obtain SpO2. Danny f/u with 6  REGGIE.  She is both compliant with and benefiting from O2 at 3-5LPM which we will continue  Surveillance CT ordered for high risk patient in May  Return in about 6 months (around 7/21/2025) for ct chest in May .

## 2025-01-21 NOTE — PROCEDURES
Technician: NELSON Sorensen    Technician Comment:  Good patient effort & cooperation.  The results of this test meet the ATS/ERS standards for acceptability & reproducibility.  Test was performed on the [employer] Body Plethysmograph-Elite DX system.  Predicted values were GLI-2012 for spirometry, GLI-2020 for DLCO, ITS for Lung Volumes.  The DLCO was uncorrected for Hgb.  A bronchodilator of Albuterol HFA -2puffs via spacer administered.  DLCO performed during dilation period.    Interpretation:  Baseline spirometry shows airflow obstruction with an FEV1/FVC ratio 41 and an FEV1 of 0.69 L or 30% predicted.  There is significant bronchodilator sponsor based on improvement in FVC.  Total lung capacity is high normal at 119% predicted.  There is significant air trapping with residual volume of 198% predicted.  Diffusion capacity is reduced at 52% predicted.  Pulmonary function testing shows severe airflow obstruction with associated air trapping and reduced DLCO consistent with advanced obstructive lung disease.

## 2025-01-26 DIAGNOSIS — G25.0 BENIGN ESSENTIAL TREMOR: ICD-10-CM

## 2025-01-26 DIAGNOSIS — I10 ESSENTIAL HYPERTENSION: ICD-10-CM

## 2025-01-26 DIAGNOSIS — E78.2 MIXED DYSLIPIDEMIA: ICD-10-CM

## 2025-01-27 NOTE — TELEPHONE ENCOUNTER
Received request via: Patient    Was the patient seen in the last year in this department? Yes    Does the patient have an active prescription (recently filled or refills available) for medication(s) requested?  Yes    Pharmacy Name: [facility] in [place]    Does the patient have alf Plus and need 100-day supply? (This applies to ALL medications) Patient does not have SCP

## 2025-01-28 ENCOUNTER — NON-PROVIDER VISIT (OUTPATIENT)
Dept: SLEEP MEDICINE | Facility: MEDICAL CENTER | Age: 65
End: 2025-01-28
Attending: INTERNAL MEDICINE
Payer: MEDICAID

## 2025-01-28 DIAGNOSIS — J96.11 CHRONIC RESPIRATORY FAILURE WITH HYPOXIA (HCC): ICD-10-CM

## 2025-01-28 DIAGNOSIS — J44.9 CHRONIC OBSTRUCTIVE PULMONARY DISEASE, UNSPECIFIED COPD TYPE (HCC): ICD-10-CM

## 2025-01-28 PROCEDURE — 94618 PULMONARY STRESS TESTING: CPT | Performed by: INTERNAL MEDICINE

## 2025-01-28 PROCEDURE — 94618 PULMONARY STRESS TESTING: CPT | Mod: 26 | Performed by: INTERNAL MEDICINE

## 2025-01-28 RX ORDER — ATORVASTATIN CALCIUM 20 MG/1
20 TABLET, FILM COATED ORAL EVERY EVENING
Qty: 90 TABLET | Refills: 1 | Status: SHIPPED | OUTPATIENT
Start: 2025-01-28

## 2025-01-28 RX ORDER — LISINOPRIL AND HYDROCHLOROTHIAZIDE 10; 12.5 MG/1; MG/1
1 TABLET ORAL DAILY
Qty: 90 TABLET | Refills: 2 | Status: SHIPPED | OUTPATIENT
Start: 2025-01-28

## 2025-01-28 RX ORDER — PROPRANOLOL HYDROCHLORIDE 60 MG/1
60 CAPSULE, EXTENDED RELEASE ORAL DAILY
Qty: 90 CAPSULE | Refills: 2 | Status: SHIPPED | OUTPATIENT
Start: 2025-01-28

## 2025-01-28 NOTE — PROCEDURES
Test started on Room Air. Before test added 02 to 2 lpm, in minute 2 upped 02 to 3 lpm, then in minute 3 upped 02 to 4 lpm patient finished test on 4 lpm 02.    RESULTS:  Baseline vital signs at rest were: blood pressure 138/74 mmHg; heart rate 78 bpm; and saturation 85% on room air.  Prior to the test, the patient reported dyspnea rated 2/10 and fatigue rated 0/10.  The patient walked a total distance of 480 feet, or 31% predicted.  At the end of the test, heart rate was 89 bpm and oxygen saturation was 97% on 4LPM nasal cannula.  Dyspnea was reported as 5/10 and fatigue was reported as 0/10.    IRic M.D. am the author of this note (walk test interpretation).  __________  Ric Cheney MD  Pulmonary and Critical Care Medicine  [facility]

## 2025-01-28 NOTE — TELEPHONE ENCOUNTER
Received request via: Patient    Was the patient seen in the last year in this department? Yes    Does the patient have an active prescription (recently filled or refills available) for medication(s) requested? No    [facility]     Does the patient have longterm Plus and need 100-day supply? (This applies to ALL medications) Patient does not have SCP

## 2025-01-30 ENCOUNTER — TELEPHONE (OUTPATIENT)
Dept: SLEEP MEDICINE | Facility: MEDICAL CENTER | Age: 65
End: 2025-01-30
Payer: MEDICAID

## 2025-01-30 NOTE — TELEPHONE ENCOUNTER
Dr. Benson, patient just completed a 6 MWT on 1/28/25. Please place new O2 order. She was also just seen on 1/21/25.

## 2025-01-31 NOTE — TELEPHONE ENCOUNTER
O2 order was already placed by Dr. Benson on 1/21/25. O2 order and 6 MWT results sent to [facility] in [place].     Patient informed.

## 2025-02-23 ENCOUNTER — OFFICE VISIT (OUTPATIENT)
Dept: URGENT CARE | Facility: PHYSICIAN GROUP | Age: 65
End: 2025-02-23
Payer: MEDICAID

## 2025-02-23 VITALS
SYSTOLIC BLOOD PRESSURE: 148 MMHG | HEART RATE: 62 BPM | BODY MASS INDEX: 30.66 KG/M2 | OXYGEN SATURATION: 98 % | HEIGHT: 62 IN | TEMPERATURE: 96.5 F | RESPIRATION RATE: 16 BRPM | WEIGHT: 166.6 LBS | DIASTOLIC BLOOD PRESSURE: 70 MMHG

## 2025-02-23 DIAGNOSIS — B00.1 COLD SORE: ICD-10-CM

## 2025-02-23 PROCEDURE — 99213 OFFICE O/P EST LOW 20 MIN: CPT | Performed by: FAMILY MEDICINE

## 2025-02-23 PROCEDURE — 3078F DIAST BP <80 MM HG: CPT | Performed by: FAMILY MEDICINE

## 2025-02-23 PROCEDURE — 3077F SYST BP >= 140 MM HG: CPT | Performed by: FAMILY MEDICINE

## 2025-02-23 RX ORDER — VALACYCLOVIR HYDROCHLORIDE 1 G/1
TABLET, FILM COATED ORAL
Qty: 4 TABLET | Refills: 0 | Status: SHIPPED | OUTPATIENT
Start: 2025-02-23

## 2025-02-23 ASSESSMENT — FIBROSIS 4 INDEX: FIB4 SCORE: 1.27

## 2025-02-23 NOTE — PROGRESS NOTES
Chief Complaint:    Chief Complaint   Patient presents with    Insect Bite     Pt states she thinks she may have a insect bite on her upper lip. Part of her lip is swollen, two white spots on the inside of the lip this morning.        History of Present Illness:    Daughter present. Skin lesion above upper lip on left side started this morning. She denies pain, itching, or tingling. She has gotten cold sores around mouth in the past, not often she gets cold sores.      Past Medical History:    Past Medical History:   Diagnosis Date    COPD (chronic obstructive pulmonary disease) (HCC)     Essential hypertension 5/19/2021    GILL (generalized anxiety disorder) 5/19/2021    GERD (gastroesophageal reflux disease)     Mild episode of recurrent major depressive disorder (HCC) 5/19/2021    Mixed dyslipidemia 6/2/2021    Tobacco dependence due to cigarettes      Past Surgical History:    Past Surgical History:   Procedure Laterality Date    PRIMARY C SECTION       Social History:    Social History     Socioeconomic History    Marital status:      Spouse name: Jm    Number of children: 2    Years of education: Not on file    Highest education level: 12th grade   Occupational History    Not on file   Tobacco Use    Smoking status: Former     Current packs/day: 0.50     Average packs/day: 0.5 packs/day for 40.0 years (20.0 ttl pk-yrs)     Types: Cigarettes     Passive exposure: Past    Smokeless tobacco: Never   Vaping Use    Vaping status: Every Day    Substances: THC   Substance and Sexual Activity    Alcohol use: Yes     Alcohol/week: 12.6 - 14.4 oz     Types: 21 - 24 Cans of beer per week     Comment: 2/day    Drug use: Yes     Types: Marijuana, Inhaled    Sexual activity: Yes     Partners: Male     Birth control/protection: Post-Menopausal   Other Topics Concern    Not on file   Social History Narrative    Not on file     Social Drivers of Health     Financial Resource Strain: Low Risk  (11/17/2024)    Overall  Financial Resource Strain (CARDIA)     Difficulty of Paying Living Expenses: Not hard at all   Food Insecurity: Food Insecurity Present (11/17/2024)    Hunger Vital Sign     Worried About Running Out of Food in the Last Year: Sometimes true     Ran Out of Food in the Last Year: Sometimes true   Transportation Needs: Unmet Transportation Needs (11/17/2024)    PRAPARE - Transportation     Lack of Transportation (Medical): Yes     Lack of Transportation (Non-Medical): No   Physical Activity: Insufficiently Active (11/17/2024)    Exercise Vital Sign     Days of Exercise per Week: 2 days     Minutes of Exercise per Session: 10 min   Stress: Stress Concern Present (11/17/2024)    [employer] - Occupational Stress Questionnaire     Feeling of Stress : Rather much   Social Connections: Socially Isolated (11/17/2024)    Social Connection and Isolation Panel [NHANES]     Frequency of Communication with Friends and Family: Once a week     Frequency of Social Gatherings with Friends and Family: Never     Attends [employer] Services: Never     Active Member of Clubs or Organizations: No     Attends Club or Organization Meetings: Never     Marital Status:    Intimate Partner Violence: Not At Risk (11/16/2024)    Humiliation, Afraid, Rape, and Kick questionnaire     Fear of Current or Ex-Partner: No     Emotionally Abused: No     Physically Abused: No     Sexually Abused: No   Housing Stability: Low Risk  (11/17/2024)    Housing Stability Vital Sign     Unable to Pay for Housing in the Last Year: No     Number of Times Moved in the Last Year: 1     Homeless in the Last Year: No     Family History:    Family History   Problem Relation Age of Onset    Kidney Disease Mother     Heart Disease Mother     Stroke Mother     Diabetes Mother     Diabetes Father     Kidney Disease Father     Arrythmia Father      Medications:    Current Outpatient Medications on File Prior to Visit   Medication Sig  "Dispense Refill    atorvastatin (LIPITOR) 20 MG Tab Take 1 Tablet by mouth every evening. 90 Tablet 1    propranolol LA (INDERAL LA) 60 MG CAPSULE SR 24 HR Take 1 Capsule by mouth every day. 90 Capsule 2    lisinopril-hydrochlorothiazide (PRINZIDE) 10-12.5 MG per tablet Take 1 Tablet by mouth every day. 90 Tablet 2    Varenicline Tartrate, Starter, (CHANTIX STARTING MONTH SOFÍA) 0.5 MG X 11 & 1 MG X 42 Tablet Therapy Pack Take as directed per package instructions. 53 Each 0    varenicline (CHANTIX CONTINUING MONTH PAK) 1 MG tablet Take as directed per package instructions. 60 Tablet 3    venlafaxine XR (EFFEXOR XR) 75 MG CAPSULE SR 24 HR Take 3 Capsules by mouth every day. 270 Capsule 1    NON SPECIFIED Oxygen 3 to 3.5  lpm with O2 nasal canula. 1 Each 12    tiotropium (SPIRIVA RESPIMAT) 2.5 mcg/Act Aero Soln Inhale 2 Inhalations every day. Assemble and prime. 3 Each 3    aspirin 81 MG EC tablet Take 81 mg by mouth every evening.      albuterol (PROAIR HFA) 108 (90 Base) MCG/ACT Aero Soln inhalation aerosol Inhale 2 Puffs every 6 hours as needed for Shortness of Breath. 18 g 6     No current facility-administered medications on file prior to visit.     Allergies:    Allergies   Allergen Reactions    Penicillins Rash     Reaction occurred when patient was very young       Vitals:    Vitals:    02/23/25 1135   BP: (!) 148/70   Pulse: 62   Resp: 16   Temp: 35.8 °C (96.5 °F)   TempSrc: Temporal   SpO2: 98%   Weight: 75.6 kg (166 lb 9.6 oz)   Height: 1.562 m (5' 1.5\")       Physical Exam:    Constitutional: Vital signs reviewed. Appears well-developed and well-nourished. No acute distress. On NC Oxygen.  Eyes: Sclera white, conjunctivae clear.   ENT: External ears normal. Hearing normal. Lips are normal.   Neck: Neck supple.   Pulmonary/Chest: Respirations non-labored.   Musculoskeletal: Normal gait. No muscular atrophy or weakness.  Neurological: Alert and oriented to person, place, and time. Muscle tone normal. " Coordination normal.   Skin: Just above upper lip on left side, there is a skin lesion that looks most consistent with a cold sore.  Psychiatric: Normal mood and affect. Behavior is normal. Judgment and thought content normal.       Assessment / Plan & Medical Decision Makin. Cold sore  - valacyclovir (VALTREX) 1 GM Tab; 2 tabs by mouth x 1 dose. Repeat in 12 hours.  Dispense: 4 Tablet; Refill: 0       Discussed with them DDX, management options, and risks, benefits, and alternatives to treatment plan agreed upon.    Daughter present. Skin lesion above upper lip on left side started this morning. She denies pain, itching, or tingling. She has gotten cold sores around mouth in the past, not often she gets cold sores.    Just above upper lip on left side, there is a skin lesion that looks most consistent with a cold sore.    Agreeable to medication prescribed.    Discussed expected course of duration, time for improvement, and to seek follow-up in Emergency Room, urgent care, or with PCP if getting worse at any time or not improving within expected time frame.

## 2025-02-25 DIAGNOSIS — J44.89 COPD (CHRONIC OBSTRUCTIVE PULMONARY DISEASE) WITH CHRONIC BRONCHITIS (HCC): ICD-10-CM

## 2025-02-25 DIAGNOSIS — E78.2 MIXED DYSLIPIDEMIA: ICD-10-CM

## 2025-02-26 RX ORDER — ALBUTEROL SULFATE 90 UG/1
2 INHALANT RESPIRATORY (INHALATION) EVERY 6 HOURS PRN
Qty: 18 G | Refills: 6 | Status: SHIPPED | OUTPATIENT
Start: 2025-02-26

## 2025-02-28 RX ORDER — ATORVASTATIN CALCIUM 20 MG/1
20 TABLET, FILM COATED ORAL EVERY EVENING
Qty: 90 TABLET | Refills: 1 | Status: SHIPPED | OUTPATIENT
Start: 2025-02-28

## 2025-02-28 NOTE — TELEPHONE ENCOUNTER
Received request via: Pharmacy    Was the patient seen in the last year in this department? Yes    Does the patient have an active prescription (recently filled or refills available) for medication(s) requested?       Pharmacy Name: [facility]    Does the patient have [facility] Plus and need 100-day supply? (This applies to ALL medications) Patient does not have SCP    Last seen 11/27/24  Last labs 11/17/24

## 2025-03-05 ENCOUNTER — PATIENT MESSAGE (OUTPATIENT)
Dept: SLEEP MEDICINE | Facility: MEDICAL CENTER | Age: 65
End: 2025-03-05

## 2025-03-05 DIAGNOSIS — R91.8 LUNG NODULES: Primary | ICD-10-CM

## 2025-04-10 DIAGNOSIS — J44.89 COPD (CHRONIC OBSTRUCTIVE PULMONARY DISEASE) WITH CHRONIC BRONCHITIS (HCC): ICD-10-CM

## 2025-04-10 RX ORDER — ALBUTEROL SULFATE 90 UG/1
2 INHALANT RESPIRATORY (INHALATION) EVERY 6 HOURS PRN
Qty: 18 G | Refills: 6 | Status: SHIPPED | OUTPATIENT
Start: 2025-04-10

## 2025-06-02 ENCOUNTER — TELEPHONE (OUTPATIENT)
Dept: SLEEP MEDICINE | Facility: MEDICAL CENTER | Age: 65
End: 2025-06-02
Payer: MEDICAID

## 2025-06-02 DIAGNOSIS — I10 ESSENTIAL HYPERTENSION: ICD-10-CM

## 2025-06-02 DIAGNOSIS — E78.2 MIXED DYSLIPIDEMIA: ICD-10-CM

## 2025-06-02 DIAGNOSIS — J44.89 COPD (CHRONIC OBSTRUCTIVE PULMONARY DISEASE) WITH CHRONIC BRONCHITIS (HCC): ICD-10-CM

## 2025-06-02 DIAGNOSIS — Z72.0 TOBACCO USE: Primary | ICD-10-CM

## 2025-06-02 DIAGNOSIS — Z72.0 TOBACCO USE: ICD-10-CM

## 2025-06-02 NOTE — TELEPHONE ENCOUNTER
Kristel is enrolled in the [facility] with [facility] 6/8/21.  Her PCP is [facility] and her pulmonologist is Dr. Kim Benson.  Kristel her annual CT scheduled as a CT-thorax but is eligible for a CT-lung cancer screening instead since she is asymptomatic.  Dr. Benson has added a CT-lung cancer screening Epic to replace the Ct-thorax.  Msg sent to the Outreach team to help change Kristel's CT appointment from a Ct-thorax to a CT-lung cancer screening.  -Dr. Kimberlee Cheney

## 2025-06-03 RX ORDER — LISINOPRIL AND HYDROCHLOROTHIAZIDE 10; 12.5 MG/1; MG/1
1 TABLET ORAL DAILY
Qty: 90 TABLET | Refills: 2 | Status: SHIPPED | OUTPATIENT
Start: 2025-06-03

## 2025-06-03 RX ORDER — ATORVASTATIN CALCIUM 20 MG/1
20 TABLET, FILM COATED ORAL EVERY EVENING
Qty: 90 TABLET | Refills: 1 | Status: SHIPPED | OUTPATIENT
Start: 2025-06-03

## 2025-06-03 NOTE — TELEPHONE ENCOUNTER
Received request via: Patient    Was the patient seen in the last year in this department? Yes    Does the patient have an active prescription (recently filled or refills available) for medication(s) requested? Yes    [facility] [place]    Does the patient have [facility] Plus and need 100-day supply? (This applies to ALL medications) Patient does not have SCP    Last office visit: 11/27/24  Last labs: 6/19/24

## 2025-06-03 NOTE — TELEPHONE ENCOUNTER
Received request via: Patient    Was the patient seen in the last year in this department? Yes    Does the patient have an active prescription (recently filled or refills available) for medication(s) requested? yes    Pharmacy Name: [facility] in [place]    Does the patient have longterm Plus and need 100-day supply? (This applies to ALL medications) Patient does not have SCP    Last office visit:11/27/24  Last labs: 6/17/24

## 2025-06-05 RX ORDER — ALBUTEROL SULFATE 90 UG/1
2 INHALANT RESPIRATORY (INHALATION) EVERY 6 HOURS PRN
Qty: 18 G | Refills: 6 | Status: SHIPPED | OUTPATIENT
Start: 2025-06-05

## 2025-06-06 RX ORDER — VARENICLINE TARTRATE 1 MG/1
TABLET, FILM COATED ORAL
Qty: 60 TABLET | Refills: 3 | Status: SHIPPED | OUTPATIENT
Start: 2025-06-06

## 2025-06-09 ENCOUNTER — HOSPITAL ENCOUNTER (OUTPATIENT)
Dept: RADIOLOGY | Facility: MEDICAL CENTER | Age: 65
End: 2025-06-09
Attending: INTERNAL MEDICINE
Payer: MEDICAID

## 2025-06-09 DIAGNOSIS — R91.8 LUNG NODULES: ICD-10-CM

## 2025-06-09 PROCEDURE — 71250 CT THORAX DX C-: CPT

## 2025-06-20 ENCOUNTER — OFFICE VISIT (OUTPATIENT)
Dept: URGENT CARE | Facility: PHYSICIAN GROUP | Age: 65
End: 2025-06-20
Payer: MEDICAID

## 2025-06-20 VITALS
HEIGHT: 62 IN | DIASTOLIC BLOOD PRESSURE: 76 MMHG | SYSTOLIC BLOOD PRESSURE: 146 MMHG | BODY MASS INDEX: 29.26 KG/M2 | HEART RATE: 100 BPM | OXYGEN SATURATION: 94 % | RESPIRATION RATE: 16 BRPM | WEIGHT: 159 LBS | TEMPERATURE: 97.2 F

## 2025-06-20 DIAGNOSIS — L30.4 INTERTRIGINOUS DERMATITIS ASSOCIATED WITH MOISTURE: Primary | ICD-10-CM

## 2025-06-20 PROCEDURE — 3077F SYST BP >= 140 MM HG: CPT | Performed by: NURSE PRACTITIONER

## 2025-06-20 PROCEDURE — 99213 OFFICE O/P EST LOW 20 MIN: CPT | Performed by: NURSE PRACTITIONER

## 2025-06-20 PROCEDURE — 3078F DIAST BP <80 MM HG: CPT | Performed by: NURSE PRACTITIONER

## 2025-06-20 RX ORDER — NYSTATIN 100000 [USP'U]/G
1 POWDER TOPICAL 3 TIMES DAILY
Qty: 30 APPLICATION | Refills: 0 | Status: SHIPPED | OUTPATIENT
Start: 2025-06-20 | End: 2025-06-30

## 2025-06-20 RX ORDER — CLOTRIMAZOLE 1 %
1 CREAM (GRAM) TOPICAL 2 TIMES DAILY
Qty: 113 G | Refills: 0 | Status: SHIPPED | OUTPATIENT
Start: 2025-06-20 | End: 2025-06-30

## 2025-06-20 ASSESSMENT — FIBROSIS 4 INDEX: FIB4 SCORE: 1.27

## 2025-06-20 NOTE — PROGRESS NOTES
"Subjective:     Kristel Sewell is a 64 y.o. female who presents for Rash (Under both breasts)      Rash    Pt presents for evaluation of a new problem.  Kristel is a very pleasant 64-year-old female presents to urgent care today with complaints of a rash under by lateral breast that started approximately 3 days ago.  Her rash is both painful and itchy.  There has been no drainage, fevers or chills.  She states that she had a similar problem last year and was prescribed clotrimazole.  She states that she had 1 dose left of clotrimazole which she applied earlier today.  Negative for changes in soap, lotion or detergent.    Review of Systems   Skin:  Positive for rash.       PMH: Past Medical History[1]  ALLERGIES: Allergies[2]  SURGHX: Past Surgical History[3]  SOCHX: Social History[4]  FH:   Family History   Problem Relation Age of Onset    Kidney Disease Mother     Heart Disease Mother     Stroke Mother     Diabetes Mother     Diabetes Father     Kidney Disease Father     Arrythmia Father          Objective:   BP (!) 146/76   Pulse 100   Temp 36.2 °C (97.2 °F) (Temporal)   Resp 16   Ht 1.562 m (5' 1.5\")   Wt 72.1 kg (159 lb)   SpO2 94% Comment: 3L of O2  BMI 29.56 kg/m²     Physical Exam  Vitals and nursing note reviewed.   Constitutional:       General: She is not in acute distress.     Appearance: Normal appearance. She is normal weight. She is not ill-appearing or toxic-appearing.   HENT:      Head: Normocephalic.      Right Ear: External ear normal.      Left Ear: External ear normal.      Nose: No congestion or rhinorrhea.      Mouth/Throat:      Pharynx: No oropharyngeal exudate or posterior oropharyngeal erythema.   Eyes:      General:         Right eye: No discharge.         Left eye: No discharge.      Pupils: Pupils are equal, round, and reactive to light.   Cardiovascular:      Rate and Rhythm: Normal rate and regular rhythm.   Pulmonary:      Effort: Pulmonary effort is normal.   Abdominal: "      General: Abdomen is flat.   Musculoskeletal:         General: Normal range of motion.      Cervical back: Normal range of motion and neck supple.   Skin:     General: Skin is dry.      Comments: Erythemic rash noted under bilateral breasts.  Rash is mildly elevated with satellite lesions present.  Skin is blanchable.    Neurological:      General: No focal deficit present.      Mental Status: She is alert and oriented to person, place, and time. Mental status is at baseline.   Psychiatric:         Mood and Affect: Mood normal.         Behavior: Behavior normal.         Thought Content: Thought content normal.         Judgment: Judgment normal.       Assessment/Plan:   Assessment    1. Intertriginous dermatitis associated with moisture  clotrimazole (LOTRIMIN) 1 % Cream    nystatin (MYCOSTATIN) powder        Rash is consistent with intertrigo.  She was started on clotrimazole as this has worked well for her in the past and rash does have yeast/fungal appearance.  She was also prescribed nystatin powder for further treatment.  Patient was encouraged to continue treatment for 48 hours following resolution of rash.  We also discussed keeping area clean and dry and using pillowcase in between breast and skin to eliminate moisture.  Patient to return for worsening or persistent symptoms.  She is in agreement with plan of care.         [1]   Past Medical History:  Diagnosis Date    COPD (chronic obstructive pulmonary disease) (HCC)     Essential hypertension 5/19/2021    GILL (generalized anxiety disorder) 5/19/2021    GERD (gastroesophageal reflux disease)     Mild episode of recurrent major depressive disorder (HCC) 5/19/2021    Mixed dyslipidemia 6/2/2021    Tobacco dependence due to cigarettes    [2]   Allergies  Allergen Reactions    Penicillins Rash     Reaction occurred when patient was very young   [3]   Past Surgical History:  Procedure Laterality Date    PRIMARY C SECTION     [4]   Social  History  Socioeconomic History    Marital status:      Spouse name: Jm    Number of children: 2    Highest education level: 12th grade   Tobacco Use    Smoking status: Former     Current packs/day: 0.50     Average packs/day: 0.5 packs/day for 40.0 years (20.0 ttl pk-yrs)     Types: Cigarettes     Passive exposure: Past    Smokeless tobacco: Never   Vaping Use    Vaping status: Every Day    Substances: THC   Substance and Sexual Activity    Alcohol use: Yes     Alcohol/week: 12.6 - 14.4 oz     Types: 21 - 24 Cans of beer per week     Comment: 2/day    Drug use: Yes     Types: Marijuana, Inhaled    Sexual activity: Yes     Partners: Male     Birth control/protection: Post-Menopausal     Social Drivers of Health     Financial Resource Strain: Low Risk  (11/17/2024)    Overall Financial Resource Strain (CARDIA)     Difficulty of Paying Living Expenses: Not hard at all   Food Insecurity: Food Insecurity Present (11/17/2024)    Hunger Vital Sign     Worried About Running Out of Food in the Last Year: Sometimes true     Ran Out of Food in the Last Year: Sometimes true   Transportation Needs: Unmet Transportation Needs (11/17/2024)    PRAPARE - Transportation     Lack of Transportation (Medical): Yes     Lack of Transportation (Non-Medical): No   Physical Activity: Insufficiently Active (11/17/2024)    Exercise Vital Sign     Days of Exercise per Week: 2 days     Minutes of Exercise per Session: 10 min   Stress: Stress Concern Present (11/17/2024)    [employer] - Occupational Stress Questionnaire     Feeling of Stress : Rather much   Social Connections: Socially Isolated (11/17/2024)    Social Connection and Isolation Panel [NHANES]     Frequency of Communication with Friends and Family: Once a week     Frequency of Social Gatherings with Friends and Family: Never     Attends Anabaptism Services: Never     Active Member of Clubs or Organizations: No     Attends Club or Organization  Meetings: Never     Marital Status:    Intimate Partner Violence: Not At Risk (11/16/2024)    Humiliation, Afraid, Rape, and Kick questionnaire     Fear of Current or Ex-Partner: No     Emotionally Abused: No     Physically Abused: No     Sexually Abused: No   Housing Stability: Low Risk  (11/17/2024)    Housing Stability Vital Sign     Unable to Pay for Housing in the Last Year: No     Number of Times Moved in the Last Year: 1     Homeless in the Last Year: No

## 2025-07-16 DIAGNOSIS — F33.1 MODERATE EPISODE OF RECURRENT MAJOR DEPRESSIVE DISORDER (HCC): ICD-10-CM

## 2025-07-16 DIAGNOSIS — F41.9 ANXIETY: ICD-10-CM

## 2025-07-16 RX ORDER — VENLAFAXINE HYDROCHLORIDE 75 MG/1
225 CAPSULE, EXTENDED RELEASE ORAL DAILY
Qty: 270 CAPSULE | Refills: 0 | Status: SHIPPED | OUTPATIENT
Start: 2025-07-16

## 2025-07-16 NOTE — TELEPHONE ENCOUNTER
Received request via: Patient    Was the patient seen in the last year in this department? Yes    Does the patient have an active prescription (recently filled or refills available) for medication(s) requested? Yes    Pharmacy Name: [facility] in [place]    Does the patient have senior living Plus and need 100-day supply? (This applies to ALL medications) Patient does not have SCP    Last Office Visit: 11/27/24  Last Labs: 6/19/24

## 2025-08-12 DIAGNOSIS — E78.2 MIXED DYSLIPIDEMIA: ICD-10-CM

## 2025-08-13 RX ORDER — ATORVASTATIN CALCIUM 20 MG/1
20 TABLET, FILM COATED ORAL EVERY EVENING
Qty: 90 TABLET | Refills: 0 | Status: SHIPPED | OUTPATIENT
Start: 2025-08-13